# Patient Record
Sex: MALE | Race: OTHER | NOT HISPANIC OR LATINO | URBAN - METROPOLITAN AREA
[De-identification: names, ages, dates, MRNs, and addresses within clinical notes are randomized per-mention and may not be internally consistent; named-entity substitution may affect disease eponyms.]

---

## 2023-07-13 ENCOUNTER — INPATIENT (INPATIENT)
Facility: HOSPITAL | Age: 68
LOS: 3 days | Discharge: ANOTHER HOSPITAL | End: 2023-07-17
Attending: GENERAL PRACTICE | Admitting: GENERAL PRACTICE
Payer: MEDICARE

## 2023-07-13 VITALS
SYSTOLIC BLOOD PRESSURE: 167 MMHG | HEART RATE: 84 BPM | RESPIRATION RATE: 16 BRPM | DIASTOLIC BLOOD PRESSURE: 89 MMHG | OXYGEN SATURATION: 98 % | TEMPERATURE: 98 F

## 2023-07-13 DIAGNOSIS — R07.9 CHEST PAIN, UNSPECIFIED: ICD-10-CM

## 2023-07-13 LAB
ALBUMIN SERPL ELPH-MCNC: 4.6 G/DL — SIGNIFICANT CHANGE UP (ref 3.3–5)
ALP SERPL-CCNC: 84 U/L — SIGNIFICANT CHANGE UP (ref 40–120)
ALT FLD-CCNC: 23 U/L — SIGNIFICANT CHANGE UP (ref 4–41)
ANION GAP SERPL CALC-SCNC: 14 MMOL/L — SIGNIFICANT CHANGE UP (ref 7–14)
AST SERPL-CCNC: 33 U/L — SIGNIFICANT CHANGE UP (ref 4–40)
BASE EXCESS BLDV CALC-SCNC: 1.7 MMOL/L — SIGNIFICANT CHANGE UP (ref -2–3)
BASOPHILS # BLD AUTO: 0.02 K/UL — SIGNIFICANT CHANGE UP (ref 0–0.2)
BASOPHILS NFR BLD AUTO: 0.4 % — SIGNIFICANT CHANGE UP (ref 0–2)
BILIRUB SERPL-MCNC: 0.4 MG/DL — SIGNIFICANT CHANGE UP (ref 0.2–1.2)
BLOOD GAS VENOUS COMPREHENSIVE RESULT: SIGNIFICANT CHANGE UP
BUN SERPL-MCNC: 16 MG/DL — SIGNIFICANT CHANGE UP (ref 7–23)
CALCIUM SERPL-MCNC: 9.8 MG/DL — SIGNIFICANT CHANGE UP (ref 8.4–10.5)
CHLORIDE BLDV-SCNC: 103 MMOL/L — SIGNIFICANT CHANGE UP (ref 96–108)
CHLORIDE SERPL-SCNC: 103 MMOL/L — SIGNIFICANT CHANGE UP (ref 98–107)
CO2 BLDV-SCNC: 27 MMOL/L — HIGH (ref 22–26)
CO2 SERPL-SCNC: 23 MMOL/L — SIGNIFICANT CHANGE UP (ref 22–31)
CREAT SERPL-MCNC: 1.05 MG/DL — SIGNIFICANT CHANGE UP (ref 0.5–1.3)
EGFR: 79 ML/MIN/1.73M2 — SIGNIFICANT CHANGE UP
EOSINOPHIL # BLD AUTO: 0.05 K/UL — SIGNIFICANT CHANGE UP (ref 0–0.5)
EOSINOPHIL NFR BLD AUTO: 1 % — SIGNIFICANT CHANGE UP (ref 0–6)
GAS PNL BLDV: 135 MMOL/L — LOW (ref 136–145)
GLUCOSE BLDC GLUCOMTR-MCNC: 58 MG/DL — LOW (ref 70–99)
GLUCOSE BLDV-MCNC: 160 MG/DL — HIGH (ref 70–99)
GLUCOSE SERPL-MCNC: 150 MG/DL — HIGH (ref 70–99)
HCO3 BLDV-SCNC: 26 MMOL/L — SIGNIFICANT CHANGE UP (ref 22–29)
HCT VFR BLD CALC: 41.3 % — SIGNIFICANT CHANGE UP (ref 39–50)
HCT VFR BLDA CALC: 41 % — SIGNIFICANT CHANGE UP (ref 39–51)
HGB BLD CALC-MCNC: 13.7 G/DL — SIGNIFICANT CHANGE UP (ref 12.6–17.4)
HGB BLD-MCNC: 13.2 G/DL — SIGNIFICANT CHANGE UP (ref 13–17)
IANC: 2.85 K/UL — SIGNIFICANT CHANGE UP (ref 1.8–7.4)
IMM GRANULOCYTES NFR BLD AUTO: 0.4 % — SIGNIFICANT CHANGE UP (ref 0–0.9)
LACTATE BLDV-MCNC: 1.4 MMOL/L — SIGNIFICANT CHANGE UP (ref 0.5–2)
LYMPHOCYTES # BLD AUTO: 1.62 K/UL — SIGNIFICANT CHANGE UP (ref 1–3.3)
LYMPHOCYTES # BLD AUTO: 31.8 % — SIGNIFICANT CHANGE UP (ref 13–44)
MAGNESIUM SERPL-MCNC: 2.3 MG/DL — SIGNIFICANT CHANGE UP (ref 1.6–2.6)
MCHC RBC-ENTMCNC: 26 PG — LOW (ref 27–34)
MCHC RBC-ENTMCNC: 32 GM/DL — SIGNIFICANT CHANGE UP (ref 32–36)
MCV RBC AUTO: 81.5 FL — SIGNIFICANT CHANGE UP (ref 80–100)
MONOCYTES # BLD AUTO: 0.53 K/UL — SIGNIFICANT CHANGE UP (ref 0–0.9)
MONOCYTES NFR BLD AUTO: 10.4 % — SIGNIFICANT CHANGE UP (ref 2–14)
NEUTROPHILS # BLD AUTO: 2.85 K/UL — SIGNIFICANT CHANGE UP (ref 1.8–7.4)
NEUTROPHILS NFR BLD AUTO: 56 % — SIGNIFICANT CHANGE UP (ref 43–77)
NRBC # BLD: 0 /100 WBCS — SIGNIFICANT CHANGE UP (ref 0–0)
NRBC # FLD: 0 K/UL — SIGNIFICANT CHANGE UP (ref 0–0)
NT-PROBNP SERPL-SCNC: 372 PG/ML — HIGH
PCO2 BLDV: 38 MMHG — LOW (ref 42–55)
PH BLDV: 7.44 — HIGH (ref 7.32–7.43)
PLATELET # BLD AUTO: 203 K/UL — SIGNIFICANT CHANGE UP (ref 150–400)
PO2 BLDV: 70 MMHG — HIGH (ref 25–45)
POTASSIUM BLDV-SCNC: 4.6 MMOL/L — SIGNIFICANT CHANGE UP (ref 3.5–5.1)
POTASSIUM SERPL-MCNC: 4.9 MMOL/L — SIGNIFICANT CHANGE UP (ref 3.5–5.3)
POTASSIUM SERPL-SCNC: 4.9 MMOL/L — SIGNIFICANT CHANGE UP (ref 3.5–5.3)
PROT SERPL-MCNC: 7.7 G/DL — SIGNIFICANT CHANGE UP (ref 6–8.3)
RBC # BLD: 5.07 M/UL — SIGNIFICANT CHANGE UP (ref 4.2–5.8)
RBC # FLD: 15.1 % — HIGH (ref 10.3–14.5)
SAO2 % BLDV: 94.4 % — HIGH (ref 67–88)
SODIUM SERPL-SCNC: 140 MMOL/L — SIGNIFICANT CHANGE UP (ref 135–145)
TROPONIN T, HIGH SENSITIVITY RESULT: 14 NG/L — SIGNIFICANT CHANGE UP
WBC # BLD: 5.09 K/UL — SIGNIFICANT CHANGE UP (ref 3.8–10.5)
WBC # FLD AUTO: 5.09 K/UL — SIGNIFICANT CHANGE UP (ref 3.8–10.5)

## 2023-07-13 PROCEDURE — 71045 X-RAY EXAM CHEST 1 VIEW: CPT | Mod: 26

## 2023-07-13 PROCEDURE — 99285 EMERGENCY DEPT VISIT HI MDM: CPT

## 2023-07-13 NOTE — ED PROVIDER NOTE - NS ED ROS FT
Gen: No fever, normal appetite  Eyes: No eye irritation or discharge  ENT: No ear pain, congestion, sore throat  Resp: No cough or trouble breathing  Cardiovascular: No current chest pain or palpitation  Gastroenteric: No nausea/vomiting, diarrhea, constipation  :  No change in urine output; no dysuria  MS: No joint or muscle pain  Skin: No rashes  Neuro: No headache; no abnormal movements  Remainder negative, except as per the HPI

## 2023-07-13 NOTE — ED ADULT NURSE NOTE - NSFALLUNIVINTERV_ED_ALL_ED
Artificial pacemaker    H/O oophorectomy Bed/Stretcher in lowest position, wheels locked, appropriate side rails in place/Call bell, personal items and telephone in reach/Instruct patient to call for assistance before getting out of bed/chair/stretcher/Non-slip footwear applied when patient is off stretcher/[place] to call system/Physically safe environment - no spills, clutter or unnecessary equipment/Purposeful proactive rounding/Room/bathroom lighting operational, light cord in reach

## 2023-07-13 NOTE — ED ADULT NURSE REASSESSMENT NOTE - NS ED NURSE REASSESS COMMENT FT1
At approximately 23:55, PCA Cholo notified author of low blood sugar level. Author reassessed pt, denying shakiness, sweating, clammy feeling, confusion, or other signs of hypoglycemia. Denies CP, SOB, dyspnea, or pain at this time. Respirations are even and unlabored, no signs of respiratory distress. Pt was given 6 fluid oz of apple juice, two jello containers, and 1 pack of georgina crackers. Pt tolerating PO well. Pt educated about signs and symptoms of hypoglycemia and to inform ED staff if any of the symptoms occur. Steady wait. Repeat finger stick of 126 obtained at 0032. Another repeat finger stick of 149 obtained at 0049. SALOME Myers made aware of hypoglycemia episode, informed RN she will order hypoglycemia protocol medications. JOSEPH Pena, floor nurse assuming care, was made aware of the episode.

## 2023-07-13 NOTE — ED PROVIDER NOTE - PHYSICAL EXAMINATION
Gen: WDWN, NAD  HEENT: EOMI, no nasal discharge, mucous membranes moist  CV: RRR, +S1/S2, no M/R/G  Resp: CTAB, no W/R/R  GI: Abdomen soft non-distended, NTTP, no masses  MSK: No open wounds, no bruising, no LE edema  Neuro: A&Ox4, following commands, moving all four extremities spontaneously  Psych: appropriate mood, denies AH, VH, SI Gen: WDWN, NAD  HEENT: EOMI, no nasal discharge, mucous membranes moist  CV: RRR, +S1/S2, no M/R/G  Resp: CTAB, no W/R/R  GI: Abdomen soft mildly distended, NTTP, no masses  MSK: No open wounds, no bruising, no LE edema  Neuro: A&Ox4, following commands, moving all four extremities spontaneously  Psych: appropriate mood, denies AH, VH, SI

## 2023-07-13 NOTE — ED ADULT TRIAGE NOTE - CHIEF COMPLAINT QUOTE
pt having chest pain / SOB/ ISRAEL sent in by MD Nixon for EKG changes/ echo changes- pt for admission for angiogram. hx htn, hld, asthma , sleep apnea, dm fs= 252

## 2023-07-13 NOTE — ED PROVIDER NOTE - OBJECTIVE STATEMENT
After Visit Summary   6/6/2017    Lorne Juan    MRN: [id number]           Patient Information     Date Of Birth          1958        Visit Information        Provider Department      6/6/2017 11:00 AM Arnoldo Zaman MD [facility] Nephrology        Care Instructions    Increase oral calcium supplement to twice daily.  Increase furosemide to 60 mg twice daily.          Follow-ups after your visit        Follow-up notes from your care team     Return in about 8 months (around 2/6/2018).      Your next 10 appointments already scheduled     Feb 12, 2018 12:30 PM CST   Lab with  LAB   [facility] Lab ([facility])    [address] [place] [id number] [phone]            Feb 12, 2018  1:30 PM CST   (Arrive by 1:00 PM)   Return Kidney Transplant with Arnoldo Zaman MD   [facility] Nephrology ([facility])    [address] [place] [id number] [phone]              Who to contact     If you have questions or need follow up information about today's clinic visit or your schedule please contact [facility] NEPHROLOGY directly at [phone].  Normal or non-critical lab and imaging results will be communicated to you by MyChart, letter or phone within 4 business days after the clinic has received the results. If you do not hear from us within 7 days, please contact the clinic through MyChart or phone. If you have a critical or abnormal lab result, we will notify you by phone as soon as possible.  Submit refill requests through [facility]na or call your pharmacy and they will forward the refill request to us. Please allow 3 business days for your refill to be completed.          Additional Information About Your Visit        MyChart Information     [facility]ilna lets you send messages to your doctor, view your test results, renew your prescriptions, schedule appointments and more. To sign up, go to  chest pressure when walking up stairs, 2-3 moths has been worsenng over time, no SOB at rest, (+) ISRAEL  T2DM, HTN, HLD, asthma, on CPAP for LUCRECIA  dr. Wilkinson - admit, patient of Dr. Nixon's    (-) n/v/d/f/c/ab pain/dysuria "[email or web address] . Click on \"Log in\" on the left side of the screen, which will take you to the Welcome page. Then click on \"Sign up Now\" on the right side of the page.     You will be asked to enter the access code listed below, as well as some personal information. Please follow the directions to create your username and password.     Your access code is: [email or web address]  Expires: 2017  1:44 PM     Your access code will  in 90 days. If you need help or a new code, please call your [facility] clinic or [phone].        Care EveryWhere ID     This is your Care EveryWhere ID. This could be used by other organizations to access your [facility] medical records  [id number]        Your Vitals Were     Pulse Temperature Respirations Height BMI (Body Mass Index)       62 97.5  F (36.4  C) (Oral) 18 1.702 m (5' 7\") 23.2 kg/m2        Blood Pressure from Last 3 Encounters:   17 121/71   17 157/83   16 134/79    Weight from Last 3 Encounters:   17 67.2 kg (148 lb 1.6 oz)   17 71.9 kg (158 lb 9.6 oz)   16 54.4 kg (120 lb)              Today, you had the following     No orders found for display         Today's Medication Changes          These changes are accurate as of: 17 11:03 AM.  If you have any questions, ask your nurse or doctor.               These medicines have changed or have updated prescriptions.        Dose/Directions    IMURAN PO   This may have changed:  Another medication with the same name was removed. Continue taking this medication, and follow the directions you see here.   Changed by:  Arnoldo Zaman MD        Dose:  75 mg   Take 75 mg by mouth daily   Refills:  0       insulin glargine 100 UNIT/ML injection   Commonly known as:  LANTUS   This may have changed:  Another medication with the same name was removed. Continue taking this medication, and follow the directions you see here.   Used for:  Type 1 diabetes mellitus with other diabetic " ophthalmic complication (H)   Changed by:  Precious Bahena PA-C        Inject 4 units in the AM; inject 6 units in the PM.   Quantity:  15 mL   Refills:  3       sodium bicarbonate 650 MG tablet   This may have changed:  how much to take   Used for:  Metabolic acidosis        Dose:  1300 mg   Take 2 tablets (1,300 mg) by mouth 2 times daily   Quantity:  60 tablet   Refills:  11         Stop taking these medicines if you haven't already. Please contact your care team if you have questions.     aspirin  MG EC tablet   Stopped by:  Arnoldo Zaman MD                    Primary Care Provider Office Phone # Fax #    Bob Quinn [phone]       [facility] [address] [facility] [address]        Thank you!     Thank you for choosing [facility] NEPHROLOG[facility]  for your care. Our goal is always to provide you with excellent care. Hearing back from our patients is one way we can continue to improve our services. Please take a few minutes to complete the written survey that you may receive in the mail after your visit with us. Thank you!             Your Updated Medication List - Protect others around you: Learn how to safely use, store and throw away your medicines at www.disposemymeds.org.          This list is accurate as of: 6/6/17 11:03 AM.  Always use your most recent med list.                   Brand Name Dispense Instructions for use    alendronate 70 MG tablet    FOSAMAX    12 tablet    Take 1 tablet (70 mg) by mouth every 7 days       amLODIPine 5 MG tablet    NORVASC    90 tablet    Take 1 tablet (5 mg) by mouth daily       blood glucose monitoring test strip    no brand specified    550 each    Test 6 times per day with  Prodigy no  Coding  Glucose strips .       brimonidine-timolol 0.2-0.5 % ophthalmic solution    COMBIGAN     Place 1 drop into the right eye 2 times daily NOT ON HOLD       brinzolamide 1 % ophthalmic susp    AZOPT     Place 1 drop into the right eye 2  times daily.       calcitRIOL 0.25 MCG capsule    ROCALTROL     Take 0.25 mcg by mouth       calcium 500-125 MG-UNIT Tabs      Take  by mouth. Plus D take one daily       carvedilol 6.25 MG tablet    COREG    60 tablet    Take 6 tablets (37.5 mg) by mouth 2 times daily       darbepoetin paulo 60 MCG/0.3ML injection    ARANESP     Inject 60 mcg Subcutaneous       furosemide 20 MG tablet    LASIX    60 tablet    Take 2 tablets (40 mg) by mouth 2 times daily       glucagon 1 MG kit    GLUCAGON EMERGENCY    2 each    Use in event of hypoglycemia       IMURAN PO      Take 75 mg by mouth daily       Injection Device for insulin Helen    HUMAPEN LUXURA HD    1 each    1 each 5 times daily Use as directed with Humalog       insulin glargine 100 UNIT/ML injection    LANTUS    15 mL    Inject 4 units in the AM; inject 6 units in the PM.       insulin lispro 100 UNIT/ML Cartridge    HUMALOG    15 mL    Carb counting with meals approx 20 units daily       insulin pen needle 32G X 4 MM    BD MARQUISE U/F    400 each    Use 4-6 daily or as directed.       losartan 25 MG tablet    COZAAR     Take 25 mg by mouth       MULTIVITAMIN PO      Take 1 tablet by mouth daily.       predniSONE 2.5 MG tablet    DELTASONE    30 tablet    Take 1 tablet (2.5 mg) by mouth daily       sodium bicarbonate 650 MG tablet     60 tablet    Take 2 tablets (1,300 mg) by mouth 2 times daily       tacrolimus 0.5 MG capsule    PROGRAF - GENERIC EQUIVALENT    60 capsule    Take 1 capsule (0.5 mg) by mouth 2 times daily       vitamin D 15318 UNIT capsule    ERGOCALCIFEROL    4 capsule    Take 1 capsule (50,000 Units) by mouth once a week for 26 doses          chest pressure when walking up stairs, 2-3 moths has been worsenng over time, no SOB at rest, (+) ISRAEL  T2DM, HTN, HLD, asthma, on CPAP for LUCRECIA  dr. Wilkinson - admit, patient of Dr. Nixon's, Dr. Isaacs for angio    ECG wih distal septum hypokinesis, EKG with STD     (-) n/v/d/f/c/ab pain/dysuria chest pressure when walking up stairs, 2-3 moths has been worsenng over time, no SOB at rest, (+) ISRAEL  T2DM, HTN, HLD, asthma, on CPAP for LUCRECIA  dr. Wilkinson - admit, patient of Dr. Nixon's, Dr. Isaacs for angio    ECG wih distal septum hypokinesis, EKG with STD     67-year-old male past medical history of type 2 diabetes, hypertension, hyperlipidemia, asthma, on CPAP for LUCRECIA  sent in by Dr. Oral Nixon for concern for worsening chest pain x2 to 3 months  (-) n/v/d/f/c/ab pain/dysuria 67-year-old male past medical history of type 2 diabetes, hypertension, hyperlipidemia, asthma, on CPAP for LUCRECIA  sent in by Dr. Oral Nixon for concern for worsening chest pain x2 to 3 months worsening Over the past several days.  Patient evaluated by Dr. Nixon in the office within echo showing some distal septum hypokinesis and EKG with ST depressions in the lateral leads.  Patient denies any chest pain currently but does endorse chest pressure on exertion, dyspnea on exertion, increased abdominal fullness over the past couple of months, no leg swelling, nausea, vomiting, diarrhea, fever, chills, infectious symptoms.  Plan for ischemic eval with Dr. Isaacs as per Dr. Dupree documentation.  (-) n/v/d/f/c/ab pain/dysuria

## 2023-07-13 NOTE — ED ADULT NURSE NOTE - OBJECTIVE STATEMENT
66 y/o male, a&ox4, ambulatory, received to ED by PMD. Pt sent to Ed for further evaluation after multiple episodes of CP on exertion. Pt denies CP, SOB, dyspnea, or pain at this time. Past medical history of HTN, HLD, DM non-insulin dependent. Airway is patent, speaking in clear and coherent sentences. Respirations are even and unlabored, no signs of respiratory distress. 18GIV placed to RAC, labs collected and sent off.

## 2023-07-13 NOTE — ED PROVIDER NOTE - CLINICAL SUMMARY MEDICAL DECISION MAKING FREE TEXT BOX
67-year-old male past medical history above sent in by Dr. Nixon for ischemic evaluation.  Vital signs stable, physical exam as above.  Will do CBC, CMP, troponin, BNP P, chest x-ray and admit for ischemic eval.  Differential includes but is not limited to ACS, heart failure, less likely pneumonia given no infectious symptoms no fevers but will assess for the same.

## 2023-07-13 NOTE — ED PROVIDER NOTE - ATTENDING CONTRIBUTION TO CARE
I performed a face to face evaluation of this patient and performed a full history and physical examination on the patient.  I agree with the resident's history, physical examination, and plan of the patient.    Pt sent in by cardiologist for admission for angiogram tomorrow. Pt reports several months of chest pain and SOB on exertion, none currently at rest. Plan for pre-op labs/CXR and admission

## 2023-07-14 ENCOUNTER — TRANSCRIPTION ENCOUNTER (OUTPATIENT)
Age: 68
End: 2023-07-14

## 2023-07-14 DIAGNOSIS — Z90.49 ACQUIRED ABSENCE OF OTHER SPECIFIED PARTS OF DIGESTIVE TRACT: Chronic | ICD-10-CM

## 2023-07-14 DIAGNOSIS — Z29.9 ENCOUNTER FOR PROPHYLACTIC MEASURES, UNSPECIFIED: ICD-10-CM

## 2023-07-14 DIAGNOSIS — R07.9 CHEST PAIN, UNSPECIFIED: ICD-10-CM

## 2023-07-14 DIAGNOSIS — E11.649 TYPE 2 DIABETES MELLITUS WITH HYPOGLYCEMIA WITHOUT COMA: ICD-10-CM

## 2023-07-14 DIAGNOSIS — Z79.899 OTHER LONG TERM (CURRENT) DRUG THERAPY: ICD-10-CM

## 2023-07-14 DIAGNOSIS — G47.33 OBSTRUCTIVE SLEEP APNEA (ADULT) (PEDIATRIC): ICD-10-CM

## 2023-07-14 LAB
ANION GAP SERPL CALC-SCNC: 10 MMOL/L — SIGNIFICANT CHANGE UP (ref 7–14)
BUN SERPL-MCNC: 14 MG/DL — SIGNIFICANT CHANGE UP (ref 7–23)
CALCIUM SERPL-MCNC: 9.6 MG/DL — SIGNIFICANT CHANGE UP (ref 8.4–10.5)
CHLORIDE SERPL-SCNC: 104 MMOL/L — SIGNIFICANT CHANGE UP (ref 98–107)
CHOLEST SERPL-MCNC: 131 MG/DL — SIGNIFICANT CHANGE UP
CO2 SERPL-SCNC: 25 MMOL/L — SIGNIFICANT CHANGE UP (ref 22–31)
CREAT SERPL-MCNC: 1.02 MG/DL — SIGNIFICANT CHANGE UP (ref 0.5–1.3)
EGFR: 82 ML/MIN/1.73M2 — SIGNIFICANT CHANGE UP
GLUCOSE BLDC GLUCOMTR-MCNC: 109 MG/DL — HIGH (ref 70–99)
GLUCOSE BLDC GLUCOMTR-MCNC: 115 MG/DL — HIGH (ref 70–99)
GLUCOSE BLDC GLUCOMTR-MCNC: 116 MG/DL — HIGH (ref 70–99)
GLUCOSE BLDC GLUCOMTR-MCNC: 117 MG/DL — HIGH (ref 70–99)
GLUCOSE BLDC GLUCOMTR-MCNC: 126 MG/DL — HIGH (ref 70–99)
GLUCOSE BLDC GLUCOMTR-MCNC: 149 MG/DL — HIGH (ref 70–99)
GLUCOSE SERPL-MCNC: 131 MG/DL — HIGH (ref 70–99)
HCT VFR BLD CALC: 40.6 % — SIGNIFICANT CHANGE UP (ref 39–50)
HDLC SERPL-MCNC: 57 MG/DL — SIGNIFICANT CHANGE UP
HGB BLD-MCNC: 13.2 G/DL — SIGNIFICANT CHANGE UP (ref 13–17)
LIPID PNL WITH DIRECT LDL SERPL: 57 MG/DL — SIGNIFICANT CHANGE UP
MAGNESIUM SERPL-MCNC: 2.7 MG/DL — HIGH (ref 1.6–2.6)
MCHC RBC-ENTMCNC: 26.8 PG — LOW (ref 27–34)
MCHC RBC-ENTMCNC: 32.5 GM/DL — SIGNIFICANT CHANGE UP (ref 32–36)
MCV RBC AUTO: 82.4 FL — SIGNIFICANT CHANGE UP (ref 80–100)
NON HDL CHOLESTEROL: 74 MG/DL — SIGNIFICANT CHANGE UP
NRBC # BLD: 0 /100 WBCS — SIGNIFICANT CHANGE UP (ref 0–0)
NRBC # FLD: 0 K/UL — SIGNIFICANT CHANGE UP (ref 0–0)
PHOSPHATE SERPL-MCNC: 3.5 MG/DL — SIGNIFICANT CHANGE UP (ref 2.5–4.5)
PLATELET # BLD AUTO: 175 K/UL — SIGNIFICANT CHANGE UP (ref 150–400)
POTASSIUM SERPL-MCNC: 3.9 MMOL/L — SIGNIFICANT CHANGE UP (ref 3.5–5.3)
POTASSIUM SERPL-SCNC: 3.9 MMOL/L — SIGNIFICANT CHANGE UP (ref 3.5–5.3)
RBC # BLD: 4.93 M/UL — SIGNIFICANT CHANGE UP (ref 4.2–5.8)
RBC # FLD: 14.9 % — HIGH (ref 10.3–14.5)
SODIUM SERPL-SCNC: 139 MMOL/L — SIGNIFICANT CHANGE UP (ref 135–145)
TRIGL SERPL-MCNC: 87 MG/DL — SIGNIFICANT CHANGE UP
TROPONIN T, HIGH SENSITIVITY RESULT: 14 NG/L — SIGNIFICANT CHANGE UP
TROPONIN T, HIGH SENSITIVITY RESULT: 14 NG/L — SIGNIFICANT CHANGE UP
TSH SERPL-MCNC: 5.39 UIU/ML — HIGH (ref 0.27–4.2)
WBC # BLD: 4.77 K/UL — SIGNIFICANT CHANGE UP (ref 3.8–10.5)
WBC # FLD AUTO: 4.77 K/UL — SIGNIFICANT CHANGE UP (ref 3.8–10.5)

## 2023-07-14 PROCEDURE — 93458 L HRT ARTERY/VENTRICLE ANGIO: CPT | Mod: 26

## 2023-07-14 PROCEDURE — 99152 MOD SED SAME PHYS/QHP 5/>YRS: CPT

## 2023-07-14 PROCEDURE — 99223 1ST HOSP IP/OBS HIGH 75: CPT

## 2023-07-14 RX ORDER — ATORVASTATIN CALCIUM 80 MG/1
40 TABLET, FILM COATED ORAL AT BEDTIME
Refills: 0 | Status: DISCONTINUED | OUTPATIENT
Start: 2023-07-14 | End: 2023-07-17

## 2023-07-14 RX ORDER — INSULIN LISPRO 100/ML
VIAL (ML) SUBCUTANEOUS AT BEDTIME
Refills: 0 | Status: DISCONTINUED | OUTPATIENT
Start: 2023-07-14 | End: 2023-07-17

## 2023-07-14 RX ORDER — SITAGLIPTIN AND METFORMIN HYDROCHLORIDE 500; 50 MG/1; MG/1
0 TABLET, FILM COATED ORAL
Qty: 0 | Refills: 4 | DISCHARGE

## 2023-07-14 RX ORDER — GLUCAGON INJECTION, SOLUTION 0.5 MG/.1ML
1 INJECTION, SOLUTION SUBCUTANEOUS ONCE
Refills: 0 | Status: DISCONTINUED | OUTPATIENT
Start: 2023-07-14 | End: 2023-07-17

## 2023-07-14 RX ORDER — EMPAGLIFLOZIN 10 MG/1
0 TABLET, FILM COATED ORAL
Qty: 0 | Refills: 3 | DISCHARGE

## 2023-07-14 RX ORDER — DEXTROSE 50 % IN WATER 50 %
12.5 SYRINGE (ML) INTRAVENOUS ONCE
Refills: 0 | Status: DISCONTINUED | OUTPATIENT
Start: 2023-07-14 | End: 2023-07-17

## 2023-07-14 RX ORDER — LOSARTAN POTASSIUM 100 MG/1
0 TABLET, FILM COATED ORAL
Qty: 0 | Refills: 3 | DISCHARGE

## 2023-07-14 RX ORDER — ASPIRIN/CALCIUM CARB/MAGNESIUM 324 MG
81 TABLET ORAL DAILY
Refills: 0 | Status: DISCONTINUED | OUTPATIENT
Start: 2023-07-14 | End: 2023-07-17

## 2023-07-14 RX ORDER — HEPARIN SODIUM 5000 [USP'U]/ML
5000 INJECTION INTRAVENOUS; SUBCUTANEOUS EVERY 12 HOURS
Refills: 0 | Status: DISCONTINUED | OUTPATIENT
Start: 2023-07-14 | End: 2023-07-17

## 2023-07-14 RX ORDER — INSULIN LISPRO 100/ML
VIAL (ML) SUBCUTANEOUS EVERY 6 HOURS
Refills: 0 | Status: DISCONTINUED | OUTPATIENT
Start: 2023-07-14 | End: 2023-07-14

## 2023-07-14 RX ORDER — SODIUM CHLORIDE 9 MG/ML
1000 INJECTION, SOLUTION INTRAVENOUS
Refills: 0 | Status: DISCONTINUED | OUTPATIENT
Start: 2023-07-14 | End: 2023-07-17

## 2023-07-14 RX ORDER — DEXTROSE 50 % IN WATER 50 %
25 SYRINGE (ML) INTRAVENOUS ONCE
Refills: 0 | Status: DISCONTINUED | OUTPATIENT
Start: 2023-07-14 | End: 2023-07-17

## 2023-07-14 RX ORDER — ERGOCALCIFEROL 1.25 MG/1
0 CAPSULE ORAL
Qty: 0 | Refills: 0 | DISCHARGE

## 2023-07-14 RX ORDER — AMLODIPINE BESYLATE 2.5 MG/1
0 TABLET ORAL
Qty: 0 | Refills: 1 | DISCHARGE

## 2023-07-14 RX ORDER — INSULIN LISPRO 100/ML
VIAL (ML) SUBCUTANEOUS AT BEDTIME
Refills: 0 | Status: DISCONTINUED | OUTPATIENT
Start: 2023-07-14 | End: 2023-07-14

## 2023-07-14 RX ORDER — INSULIN LISPRO 100/ML
VIAL (ML) SUBCUTANEOUS
Refills: 0 | Status: DISCONTINUED | OUTPATIENT
Start: 2023-07-14 | End: 2023-07-17

## 2023-07-14 RX ORDER — DEXTROSE 50 % IN WATER 50 %
15 SYRINGE (ML) INTRAVENOUS ONCE
Refills: 0 | Status: DISCONTINUED | OUTPATIENT
Start: 2023-07-14 | End: 2023-07-17

## 2023-07-14 RX ORDER — INSULIN LISPRO 100/ML
VIAL (ML) SUBCUTANEOUS
Refills: 0 | Status: DISCONTINUED | OUTPATIENT
Start: 2023-07-14 | End: 2023-07-14

## 2023-07-14 RX ADMIN — Medication 81 MILLIGRAM(S): at 07:43

## 2023-07-14 RX ADMIN — HEPARIN SODIUM 5000 UNIT(S): 5000 INJECTION INTRAVENOUS; SUBCUTANEOUS at 17:23

## 2023-07-14 RX ADMIN — HEPARIN SODIUM 5000 UNIT(S): 5000 INJECTION INTRAVENOUS; SUBCUTANEOUS at 06:37

## 2023-07-14 RX ADMIN — ATORVASTATIN CALCIUM 40 MILLIGRAM(S): 80 TABLET, FILM COATED ORAL at 21:44

## 2023-07-14 NOTE — DISCHARGE NOTE PROVIDER - NSDCFUSCHEDAPPT_GEN_ALL_CORE_FT
[facility]  [facility] PreAdmits  Scheduled Appointment: 07/18/2023     [facility]  CTSURG [address]  Scheduled Appointment: 08/08/2023

## 2023-07-14 NOTE — H&P ADULT - TIME BILLING
Preparing to see the patient including review of tests and other providers' notes, confirming history with patient performing medical examination and evaluation, counseling and educating the patient, ordering medications, tests, documenting clinical information in the EMR, independently interpreting results and communicating results to the patient, care coordination

## 2023-07-14 NOTE — CONSULT NOTE ADULT - SUBJECTIVE AND OBJECTIVE BOX
CHIEF COMPLAINT: cp     HISTORY OF PRESENT ILLNESS:    67-year-old male with NIDDM2, HTN, HLD, asthma, CPAP on LUCRECIA, sent to the ED by Dr. Hart for work-up of EKG abnormalities. Patient reports seeing Dr. Hart for the first time yesterday for intermittent chest pain that has been ongoing with exertion (especially climbing stairs and carrying heavy objects). The pain is described as a pressure over left and right anterior chest wall, radiating to R shoulder, 10/10 in severity, lasting 15-30 minutes at a time, worse with exertion, associated with diaphoresis and without any associated nausea, shortness of breath or palpitations. He denies any history of CAD, prior cath/echo or stress testing. He reports prior w/u at [facility] however unsure of results or testing performed.     Allergies    No Known Allergies    Intolerances    	    MEDICATIONS:  aspirin enteric coated 81 milliGRAM(s) Oral daily  heparin   Injectable 5000 Unit(s) SubCutaneous every 12 hours            atorvastatin 40 milliGRAM(s) Oral at bedtime  dextrose 50% Injectable 12.5 Gram(s) IV Push once  dextrose 50% Injectable 25 Gram(s) IV Push once  dextrose 50% Injectable 25 Gram(s) IV Push once  dextrose Oral Gel 15 Gram(s) Oral once PRN  glucagon  Injectable 1 milliGRAM(s) IntraMuscular once  insulin lispro (ADMELOG) corrective regimen sliding scale   SubCutaneous every 6 hours    dextrose 5%. 1000 milliLiter(s) IV Continuous <Continuous>  dextrose 5%. 1000 milliLiter(s) IV Continuous <Continuous>      PAST MEDICAL & SURGICAL HISTORY:  Hypertension      Hyperlipemia      Diabetes mellitus      Asthma      LUCRECIA on CPAP      History of appendectomy          FAMILY HISTORY:  No pertinent family history in first degree relatives        SOCIAL HISTORY:    non smoker. indep in adl    REVIEW OF SYSTEMS:  See HPI, otherwise complete 10 point review of systems negative    [ ] All others negative	      PHYSICAL EXAM:  T(C): 36.5 (07-14-23 @ 05:47), Max: 36.8 (07-13-23 @ 19:26)  HR: 75 (07-14-23 @ 05:47) (71 - 84)  BP: 125/78 (07-14-23 @ 05:47) (125/78 - 167/89)  RR: 18 (07-14-23 @ 05:47) (16 - 18)  SpO2: 98% (07-14-23 @ 05:47) (98% - 100%)  Wt(kg): --  I&O's Summary      Appearance: No Acute Distress	  HEENT:  Normal oral mucosa, PERRL, EOMI	  Cardiovascular: Normal S1 S2, No JVD, No murmurs/rubs/gallops  Respiratory: Lungs clear to auscultation bilaterally  Gastrointestinal:  Soft, Non-tender, + BS	  Skin: No rashes, No ecchymoses, No cyanosis	  Neurologic: Non-focal  Extremities: No clubbing, cyanosis or edema  Vascular: Peripheral pulses palpable 2+ bilaterally  Psychiatry: A & O x 3, Mood & affect appropriate    Laboratory Data:	 	    CBC Full  -  ( 13 Jul 2023 21:20 )  WBC Count : 5.09 K/uL  Hemoglobin : 13.2 g/dL  Hematocrit : 41.3 %  Platelet Count - Automated : 203 K/uL  Mean Cell Volume : 81.5 fL  Mean Cell Hemoglobin : 26.0 pg  Mean Cell Hemoglobin Concentration : 32.0 gm/dL  Auto Neutrophil # : 2.85 K/uL  Auto Lymphocyte # : 1.62 K/uL  Auto Monocyte # : 0.53 K/uL  Auto Eosinophil # : 0.05 K/uL  Auto Basophil # : 0.02 K/uL  Auto Neutrophil % : 56.0 %  Auto Lymphocyte % : 31.8 %  Auto Monocyte % : 10.4 %  Auto Eosinophil % : 1.0 %  Auto Basophil % : 0.4 %    07-13    140  |  103  |  16  ----------------------------<  150<H>  4.9   |  23  |  1.05    Ca    9.8      13 Jul 2023 21:20  Mg     2.30     07-13    TPro  7.7  /  Alb  4.6  /  TBili  0.4  /  DBili  x   /  AST  33  /  ALT  23  /  AlkPhos  84  07-13      proBNP:   Lipid Profile:   HgA1c:   TSH:       CARDIAC MARKERS:            Interpretation of Telemetry: 	    ECG:  	nsr lateral st depression  RADIOLOGY:  OTHER: 	    PREVIOUS DIAGNOSTIC TESTING:    [ ] Echocardiogram:  [ ] Catheterization:  [ ] Stress Test:  	    Assessment:  67-year-old male with NIDDM2, HTN, HLD, asthma, CPAP on LUCRECIA presents with unstable angina    Recs:  cardiac stable  cw asa statin and antianginals  recent TTE with normal LVEF and inferolateral hypokinesis, suspect stunned myocardium, would repeat after PCI   [facility] with dr carmichael today      Greater than 60 minutes spent on total encounter; more than 50% of the visit was spent counseling and/or coordinating care by the attending physician.   	  Shaun Nixon MD   Cardiovascular Diseases  [phone]

## 2023-07-14 NOTE — DISCHARGE NOTE PROVIDER - NSDCCAREPROVSEEN_GEN_ALL_CORE_FT
Carolyn Wilkinson Carolyn Escoto [place]  User ADM  Shaun Nixon  Team [facility] Medicine ACP      [ Greater than 35 min spent for discharge services.   NOREEN Wilkinson ]

## 2023-07-14 NOTE — H&P ADULT - PROBLEM SELECTOR PLAN 1
currently resolved on my exam  negative delta trop, will repeat with AM labs  ST depressions on lateral leads, per ED notes plan was for ischemic eval with Dr. Isaacs, would call in AM   check TTE  monitor on tele  check A1c, FLP, TSH  repeat CE/EKG if experiences chest pain  pro-BNP wnl for age currently resolved on my exam  negative delta trop, will repeat with AM labs  ST depressions on lateral leads, per ED notes plan was for ischemic eval with Dr. Isaacs, would call in AM   check TTE  monitor on tele  check A1c, FLP, TSH  repeat CE/EKG if experiences chest pain  pro-BNP wnl for age  c/w ASA, statin

## 2023-07-14 NOTE — DISCHARGE NOTE PROVIDER - HOSPITAL COURSE
67-year-old male with NIDDM2, HTN, HLD, asthma, CPAP on LUCRECIA, sent to the ED by Dr. Hart for work-up of EKG abnormalities. Patient reports seeing Dr. Hart for the first time yesterday for intermittent chest pain that has been ongoing with exertion (especially climbing stairs and carrying heavy objects). The pain is described as a pressure over left and right anterior chest wall, radiating to R shoulder, 10/10 in severity, lasting 15-30 minutes at a time, worse with exertion, associated with diaphoresis and without any associated nausea, shortness of breath or palpitations. He denies any history of CAD, prior cath/echo or stress testing. He reports prior w/u at [facility] however unsure of results or testing performed.      Chest pain.   ·  Plan: currently resolved on my exam  negative delta trop, will repeat with AM labs  ST depressions on lateral leads, per ED notes plan was for ischemic eval with Dr. Isaacs, would call in AM   check TTE  monitor on tele  check A1c, FLP, TSH  repeat CE/EKG if experiences chest pain  pro-BNP wnl for age  c/w ASA, statin.  Cardiac cath done on 7/14/23 showed - LHC: pLAD 90%, D1 95%, mLCx 90%, mRCA 90%, LVEDP 15, RRA  accessed    Problem/Plan - 2:  ·  Problem: Hypoglycemia associated with type 2 diabetes mellitus.   ·  Plan: repeat wnl  NPO pending cardiology eval, FS Q6H  hold oral meds while inpatient  FS Q6H w/ISS.    Problem/Plan - 3:  ·  Problem: LUCRECIA on CPAP.   ·  Plan: confirm home settings with pulmonologist in AM.    Problem/Plan - 4:  ·  Problem: Medication management.   ·  Plan: clinical pharmacist emailed for assistance with medication reconciliation.    Problem/Plan - 5:  ·  Problem: Need for prophylactic measure.   ·  Plan: HSQ for DVT ppx.    Transfer to [facility] for CABG eval with Dr Hall.

## 2023-07-14 NOTE — H&P ADULT - HISTORY OF PRESENT ILLNESS
67-year-old male with NIDDM2, HTN, HLD, asthma, CPAP on LUCRECIA, sent to the ED by Dr. Hart for work-up of EKG abnormalities. Patient reports seeing Dr. Hart for the first time yesterday for intermittent chest pain that has been ongoing with exertion (especially climbing stairs and carrying heavy objects). The pain is described as a pressure over left and right anterior chest wall, radiating to R shoulder, 10/10 in severity, lasting 15-30 minutes at a time, worse with exertion, associated with diaphoresis and without any associated nausea, shortness of breath or palpitations. He denies any history of CAD, prior cath/echo or stress testing. He reports prior w/u at [facility] however unsure of results or testing performed.     In the ED VS: 98.3  84  167-89  16  98%RA

## 2023-07-14 NOTE — H&P ADULT - NSHPREVIEWOFSYSTEMS_GEN_ALL_CORE
REVIEW OF SYSTEMS:    CONSTITUTIONAL: No weakness, fevers or chills  EYES/ENT: No visual changes; No dysphagia; No sore throat; No rhinorrhea; No sinus pain/pressure  NECK: No pain or stiffness  RESPIRATORY: No cough, wheezing, hemoptysis; No shortness of breath  CARDIOVASCULAR: No chest pain or palpitations; No lower extremity edema  GASTROINTESTINAL: No abdominal or epigastric pain. No nausea, vomiting, or hematemesis; No diarrhea or constipation. No melena or hematochezia. (+)GERD  GENITOURINARY: No dysuria, frequency or hematuria  NEUROLOGICAL: No numbness, paresthesias, or weakness; No HA; No LH/dizziness  MSK: ambulates without aid; No falls  SKIN: No itching, burning, rashes, or lesions   All other review of systems is negative unless indicated above.

## 2023-07-14 NOTE — H&P ADULT - NSHPLABSRESULTS_GEN_ALL_CORE
13.2   5.09  )-----------( 203 ( 13 Jul 2023 21:20 )             41.3     140  |  103  |  16  ----------------------------<  150<H>     07-13  4.9   |  23  |  1.05    Ca    9.8      13 Jul 2023 21:20  Mg     2.30     07-13    TPro  7.7  /  Alb  4.6  /  TBili  0.4  /  DBili  x   /  AST  33  /  ALT  23  /  AlkPhos  84  07-13    21:20 - VBG - pH: 7.44  | pCO2: 38    | pO2: 70    | Lactate: 1.4      hs Troponin, T - 14 ng/L (07-13-23 @ 23:00)  hs Troponin, T - 14 ng/L (07-13-23 @ 21:20)    Pro-BNP: 372 (07-13-23 @ 21:20)    CXR personally reviewed and interpreted - No focal consolidations     EKG personally reviewed and interpreted - NSR 85bpm, TWI in aVL, ST depressions ~1mm in V4-V6; QTc 414ms

## 2023-07-14 NOTE — H&P ADULT - TIME-BASED BILLING (NON-CRITICAL CARE)
Cibinqo Pregnancy And Lactation Text: It is unknown if this medication will adversely affect pregnancy or breast feeding.  You should not take this medication if you are currently pregnant or planning a pregnancy or while breastfeeding. Time-based billing (NON-critical care)

## 2023-07-14 NOTE — H&P ADULT - NSICDXPASTMEDICALHX_GEN_ALL_CORE_FT
PAST MEDICAL HISTORY:  Asthma     Diabetes mellitus     Hyperlipemia     Hypertension     LUCRECIA on CPAP

## 2023-07-14 NOTE — DISCHARGE NOTE PROVIDER - NSDCMRMEDTOKEN_GEN_ALL_CORE_FT
aspirin 81 mg oral delayed release tablet: 1 tab(s) orally once a day  ATORVASTATIN 40MG TAB: 1 tab(s) orally once a day  OMEPRAZOLE 40MG CAPSULE: TAKE 1 CAPSULE BY MOUTH DAILY  TAMSULOSIN CAPS 0.4 MG     ACT: TAKE 1 CAPSULE BY MOUTH DAILY AT BEDTIME   amLODIPine 10 mg oral tablet: 1 tab(s) orally once a day  aspirin 81 mg oral delayed release tablet: 1 tab(s) orally once a day  ATORVASTATIN 40MG TAB: 1 tab(s) orally once a day  metoprolol: 12.5 milligram(s) orally 2 times a day  pantoprazole 40 mg oral delayed release tablet: 1 tab(s) orally once a day (before a meal)  TAMSULOSIN CAPS 0.4 MG     ACT: TAKE 1 CAPSULE BY MOUTH DAILY AT BEDTIME   acetaminophen: 2 cap(s) orally once a day (at bedtime)  amLODIPine 10 mg oral tablet: 1 tab(s) orally once a day  aspirin 81 mg oral delayed release tablet: 1 tab(s) orally once a day  ATORVASTATIN 40MG TAB: 1 tab(s) orally once a day  furosemide 40 mg oral tablet: 1 tab(s) orally once a day  glimepiride 2 mg oral tablet: 1 tab(s) orally 2 times a day  metoprolol tartrate 50 mg oral tablet: 1 tab(s) orally 2 times a day  oxyCODONE 5 mg oral tablet: 1 tab(s) orally every 6 hours as needed for  moderate pain MDD: 20  pantoprazole 40 mg oral delayed release tablet: 1 tab(s) orally once a day (before a meal)  Potassium Chloride (Eqv-Klor-Con 10) 10 mEq oral tablet, extended release: 1 tab(s) orally once a day  senna leaf extract oral tablet: 2 tab(s) orally once a day (at bedtime)  sitagliptin-metformin 50 mg-1000 mg oral tablet: 1 tab(s) orally 2 times a day  spironolactone 25 mg oral tablet: 1 tab(s) orally once a day  TAMSULOSIN CAPS 0.4 MG     ACT: TAKE 1 CAPSULE BY MOUTH DAILY AT BEDTIME

## 2023-07-14 NOTE — DISCHARGE NOTE PROVIDER - NSDCCPCAREPLAN_GEN_ALL_CORE_FT
PRINCIPAL DISCHARGE DIAGNOSIS  Diagnosis: Chest pain  Assessment and Plan of Treatment: - LHC: pLAD 90%, D1 95%, mLCx 90%, mRCA 90%, LVEDP 15, RRA accessed transfer to [facility] for CABG eval      SECONDARY DISCHARGE DIAGNOSES  Diagnosis: CAD (coronary artery disease)  Assessment and Plan of Treatment: - C: pLAD 90%, D1 95%, mLCx 90%, mRCA 90%, LVEDP 15, RRA accessed transfer to [facility] for CABG eval    Diagnosis: HTN (hypertension)  Assessment and Plan of Treatment: Followup with Please follow up with your primary care physician regarding your hospitalization and take all medications prescribed.

## 2023-07-14 NOTE — H&P ADULT - NSHPPHYSICALEXAM_GEN_ALL_CORE
Vital Signs Last 24 Hrs  T(C): 36.6 (14 Jul 2023 01:02), Max: 36.8 (13 Jul 2023 19:26)  T(F): 97.8 (14 Jul 2023 01:02), Max: 98.3 (13 Jul 2023 19:26)  HR: 78 (14 Jul 2023 01:02) (71 - 84)  BP: 159/85 (14 Jul 2023 01:02) (127/74 - 167/89)  BP(mean): 90 (14 Jul 2023 00:02) (90 - 90)  RR: 18 (14 Jul 2023 01:02) (16 - 18)  SpO2: 100% (14 Jul 2023 01:02) (98% - 100%)    Parameters below as of 14 Jul 2023 01:02  Patient On (Oxygen Delivery Method): room air    PHYSICAL EXAM:  GENERAL: NAD  HEAD:  Atraumatic, Normocephalic  EYES: EOMI, PERRL, conjunctiva and sclera clear  NECK: Supple, No JVD  CHEST/LUNG: Clear to auscultation bilaterally; No wheezes, rales or rhonchi; normal work of breathing, speaking in full sentences  HEART: Regular rate and rhythm; No murmurs, rubs, or gallops, (+)S1, S2  ABDOMEN: Soft, Nontender, Nondistended; Normal Bowel sounds   EXTREMITIES:  2+ Peripheral Pulses, No clubbing, cyanosis, or edema  PSYCH: normal mood and affect, A&Ox3  NEUROLOGY: no focal neuro deficits  SKIN: No rashes or lesions

## 2023-07-14 NOTE — DISCHARGE NOTE PROVIDER - CARE PROVIDER_API CALL
Shaun Nixon  Cardiovascular Disease  [address] [place] [address]  Phone: [phone]  Fax: [phone]  Established Patient  Follow Up Time: 1 week

## 2023-07-14 NOTE — PHARMACOTHERAPY INTERVENTION NOTE - COMMENTS
Medication list in Outpatient Medication Review (OMR) confirmed with outpatient pharmacy ([facility]).

## 2023-07-14 NOTE — H&P ADULT - ASSESSMENT
67-year-old male with NIDDM2, HTN, HLD, asthma, CPAP on LUCERCIA, sent to the ED by Dr. Hart for work-up of EKG abnormalities.

## 2023-07-15 LAB
A1C WITH ESTIMATED AVERAGE GLUCOSE RESULT: 7 % — HIGH (ref 4–5.6)
ANION GAP SERPL CALC-SCNC: 16 MMOL/L — HIGH (ref 7–14)
BUN SERPL-MCNC: 18 MG/DL — SIGNIFICANT CHANGE UP (ref 7–23)
CALCIUM SERPL-MCNC: 9.7 MG/DL — SIGNIFICANT CHANGE UP (ref 8.4–10.5)
CHLORIDE SERPL-SCNC: 100 MMOL/L — SIGNIFICANT CHANGE UP (ref 98–107)
CO2 SERPL-SCNC: 21 MMOL/L — LOW (ref 22–31)
CREAT SERPL-MCNC: 1.04 MG/DL — SIGNIFICANT CHANGE UP (ref 0.5–1.3)
EGFR: 80 ML/MIN/1.73M2 — SIGNIFICANT CHANGE UP
ESTIMATED AVERAGE GLUCOSE: 154 — SIGNIFICANT CHANGE UP
GLUCOSE BLDC GLUCOMTR-MCNC: 122 MG/DL — HIGH (ref 70–99)
GLUCOSE BLDC GLUCOMTR-MCNC: 130 MG/DL — HIGH (ref 70–99)
GLUCOSE BLDC GLUCOMTR-MCNC: 141 MG/DL — HIGH (ref 70–99)
GLUCOSE BLDC GLUCOMTR-MCNC: 143 MG/DL — HIGH (ref 70–99)
GLUCOSE SERPL-MCNC: 144 MG/DL — HIGH (ref 70–99)
HCT VFR BLD CALC: 44.5 % — SIGNIFICANT CHANGE UP (ref 39–50)
HCV AB S/CO SERPL IA: 0.1 S/CO — SIGNIFICANT CHANGE UP (ref 0–0.99)
HCV AB SERPL-IMP: SIGNIFICANT CHANGE UP
HGB BLD-MCNC: 14.2 G/DL — SIGNIFICANT CHANGE UP (ref 13–17)
MAGNESIUM SERPL-MCNC: 2.3 MG/DL — SIGNIFICANT CHANGE UP (ref 1.6–2.6)
MCHC RBC-ENTMCNC: 26.6 PG — LOW (ref 27–34)
MCHC RBC-ENTMCNC: 31.9 GM/DL — LOW (ref 32–36)
MCV RBC AUTO: 83.3 FL — SIGNIFICANT CHANGE UP (ref 80–100)
NRBC # BLD: 0 /100 WBCS — SIGNIFICANT CHANGE UP (ref 0–0)
NRBC # FLD: 0 K/UL — SIGNIFICANT CHANGE UP (ref 0–0)
PHOSPHATE SERPL-MCNC: 3.5 MG/DL — SIGNIFICANT CHANGE UP (ref 2.5–4.5)
PLATELET # BLD AUTO: 177 K/UL — SIGNIFICANT CHANGE UP (ref 150–400)
POTASSIUM SERPL-MCNC: 4.1 MMOL/L — SIGNIFICANT CHANGE UP (ref 3.5–5.3)
POTASSIUM SERPL-SCNC: 4.1 MMOL/L — SIGNIFICANT CHANGE UP (ref 3.5–5.3)
RBC # BLD: 5.34 M/UL — SIGNIFICANT CHANGE UP (ref 4.2–5.8)
RBC # FLD: 14.9 % — HIGH (ref 10.3–14.5)
SODIUM SERPL-SCNC: 137 MMOL/L — SIGNIFICANT CHANGE UP (ref 135–145)
WBC # BLD: 5.61 K/UL — SIGNIFICANT CHANGE UP (ref 3.8–10.5)
WBC # FLD AUTO: 5.61 K/UL — SIGNIFICANT CHANGE UP (ref 3.8–10.5)

## 2023-07-15 RX ADMIN — Medication 81 MILLIGRAM(S): at 08:36

## 2023-07-15 RX ADMIN — HEPARIN SODIUM 5000 UNIT(S): 5000 INJECTION INTRAVENOUS; SUBCUTANEOUS at 06:17

## 2023-07-15 RX ADMIN — HEPARIN SODIUM 5000 UNIT(S): 5000 INJECTION INTRAVENOUS; SUBCUTANEOUS at 18:05

## 2023-07-15 RX ADMIN — ATORVASTATIN CALCIUM 40 MILLIGRAM(S): 80 TABLET, FILM COATED ORAL at 21:19

## 2023-07-15 NOTE — CHART NOTE - NSCHARTNOTEFT_GEN_A_CORE
GILA HSAH  MRN-[id number]    Patient status post [facility] via RRA access. Site clean, dry, and intact. No hematoma or active bleeding. No blood noted on gauze. Extremities warm to touch. Pulses present b/l, capillary refill appropriate. Denies any pain, numbness or tingling. Will continue to monitor.    T(C): 36.5 (07-15-23 @ 01:34), Max: 36.7 (07-14-23 @ 13:40)  HR: 73 (07-15-23 @ 01:34) (69 - 78)  BP: 147/93 (07-15-23 @ 01:34) (106/74 - 147/93)  RR: 18 (07-15-23 @ 01:34) (18 - 18)  SpO2: 100% (07-15-23 @ 01:34) (98% - 100%)    Pete Wilson PA-C
Patient evaluated, case discussed with cardiologist , ACP, chart reviewed, H/P already done by nocturnist   cardiac cath today  dispo plan pending above and cardiologist clearance)   Dr. NOREEN Wilkinson ([phone])
[id number] COVERAGE.    Hypoglycemic episode. FS 56. Pt asymptomatic at this time. Pt a&Ox4, and was able to tolerate PO with improvement in his FS.   Per chart review, pt T2DM, will place hypoglycemic protocol. Will continue to monitor.     Vital Signs Last 24 Hrs  T(C): 36.4 (14 Jul 2023 00:02), Max: 36.8 (13 Jul 2023 19:26)  T(F): 97.5 (14 Jul 2023 00:02), Max: 98.3 (13 Jul 2023 19:26)  HR: 71 (14 Jul 2023 00:02) (71 - 84)  BP: 127/74 (14 Jul 2023 00:02) (127/74 - 167/89)  BP(mean): 90 (14 Jul 2023 00:02) (90 - 90)  RR: 16 (14 Jul 2023 00:02) (16 - 16)  SpO2: 98% (14 Jul 2023 00:02) (98% - 98%)    Parameters below as of 14 Jul 2023 00:02  Patient On (Oxygen Delivery Method): room air    CAPILLARY BLOOD GLUCOSE  POCT Blood Glucose.: 149 mg/dL (14 Jul 2023 00:49)  POCT Blood Glucose.: 126 mg/dL (14 Jul 2023 00:32)  POCT Blood Glucose.: 58 mg/dL (13 Jul 2023 23:55)  POCT Blood Glucose.: 252 mg/dL (13 Jul 2023 19:33)    Nela Myers PA  Medicine [id number]

## 2023-07-16 LAB
ANION GAP SERPL CALC-SCNC: 14 MMOL/L — SIGNIFICANT CHANGE UP (ref 7–14)
BUN SERPL-MCNC: 20 MG/DL — SIGNIFICANT CHANGE UP (ref 7–23)
CALCIUM SERPL-MCNC: 9.6 MG/DL — SIGNIFICANT CHANGE UP (ref 8.4–10.5)
CHLORIDE SERPL-SCNC: 101 MMOL/L — SIGNIFICANT CHANGE UP (ref 98–107)
CO2 SERPL-SCNC: 23 MMOL/L — SIGNIFICANT CHANGE UP (ref 22–31)
CREAT SERPL-MCNC: 1.02 MG/DL — SIGNIFICANT CHANGE UP (ref 0.5–1.3)
EGFR: 82 ML/MIN/1.73M2 — SIGNIFICANT CHANGE UP
GLUCOSE BLDC GLUCOMTR-MCNC: 109 MG/DL — HIGH (ref 70–99)
GLUCOSE BLDC GLUCOMTR-MCNC: 160 MG/DL — HIGH (ref 70–99)
GLUCOSE BLDC GLUCOMTR-MCNC: 169 MG/DL — HIGH (ref 70–99)
GLUCOSE BLDC GLUCOMTR-MCNC: 169 MG/DL — HIGH (ref 70–99)
GLUCOSE SERPL-MCNC: 159 MG/DL — HIGH (ref 70–99)
HCT VFR BLD CALC: 43.7 % — SIGNIFICANT CHANGE UP (ref 39–50)
HGB BLD-MCNC: 14.1 G/DL — SIGNIFICANT CHANGE UP (ref 13–17)
MAGNESIUM SERPL-MCNC: 2.2 MG/DL — SIGNIFICANT CHANGE UP (ref 1.6–2.6)
MCHC RBC-ENTMCNC: 26.4 PG — LOW (ref 27–34)
MCHC RBC-ENTMCNC: 32.3 GM/DL — SIGNIFICANT CHANGE UP (ref 32–36)
MCV RBC AUTO: 81.8 FL — SIGNIFICANT CHANGE UP (ref 80–100)
NRBC # BLD: 0 /100 WBCS — SIGNIFICANT CHANGE UP (ref 0–0)
NRBC # FLD: 0 K/UL — SIGNIFICANT CHANGE UP (ref 0–0)
PHOSPHATE SERPL-MCNC: 3.6 MG/DL — SIGNIFICANT CHANGE UP (ref 2.5–4.5)
PLATELET # BLD AUTO: 169 K/UL — SIGNIFICANT CHANGE UP (ref 150–400)
POTASSIUM SERPL-MCNC: 4.1 MMOL/L — SIGNIFICANT CHANGE UP (ref 3.5–5.3)
POTASSIUM SERPL-SCNC: 4.1 MMOL/L — SIGNIFICANT CHANGE UP (ref 3.5–5.3)
RBC # BLD: 5.34 M/UL — SIGNIFICANT CHANGE UP (ref 4.2–5.8)
RBC # FLD: 14.8 % — HIGH (ref 10.3–14.5)
SODIUM SERPL-SCNC: 138 MMOL/L — SIGNIFICANT CHANGE UP (ref 135–145)
WBC # BLD: 5.34 K/UL — SIGNIFICANT CHANGE UP (ref 3.8–10.5)
WBC # FLD AUTO: 5.34 K/UL — SIGNIFICANT CHANGE UP (ref 3.8–10.5)

## 2023-07-16 RX ADMIN — Medication 1: at 08:40

## 2023-07-16 RX ADMIN — Medication 81 MILLIGRAM(S): at 12:48

## 2023-07-16 RX ADMIN — Medication 1: at 12:48

## 2023-07-16 RX ADMIN — ATORVASTATIN CALCIUM 40 MILLIGRAM(S): 80 TABLET, FILM COATED ORAL at 22:34

## 2023-07-16 RX ADMIN — HEPARIN SODIUM 5000 UNIT(S): 5000 INJECTION INTRAVENOUS; SUBCUTANEOUS at 07:35

## 2023-07-16 RX ADMIN — HEPARIN SODIUM 5000 UNIT(S): 5000 INJECTION INTRAVENOUS; SUBCUTANEOUS at 18:24

## 2023-07-16 NOTE — PROGRESS NOTE ADULT - ASSESSMENT
_________________________________________________________________________________________  ========>>  M E D I C A L   A T T E N D I N G    F O L L O W  U P  N O T E  <<=========  -----------------------------------------------------------------------------------------------------    - Patient seen and examined by me earlier today.   - In summary,  GILA SHAH is a 67y year old man admitted with   - Patient today overall doing ok, comfortable, eating OK.     ==================>> REVIEW OF SYSTEM <<=================    GEN: no fever, no chills, no pain  RESP: no SOB, no cough, no sputum  CVS: no chest pain, no palpitations, no edema  GI: no abdominal pain, no nausea, no constipation, no diarrhea  : no dysuria, no frequency, no hematuria  Neuro: no headache, no dizziness  Derm : no itching, no rash    ==================>> PHYSICAL EXAM <<=================    GEN: A&O X 3 , NAD , comfortable, pleasant, calm   HEENT: NCAT, PERRL, MMM, hearing intact  Neck: supple , no JVD appreciated  CVS: S1S2 , regular , No M/R/G appreciated  PULM: CTA B/L,  no W/R/R appreciated  ABD.: soft. non tender, non distended,  bowel sounds present  Extrem: intact pulses , no edema   PSYCH : normal mood,  not anxious                            ( Note Written / Date of service :  07-16-23 )    ==================>> MEDICATIONS <<====================    MEDICATIONS  (STANDING):  aspirin enteric coated 81 milliGRAM(s) Oral daily  atorvastatin 40 milliGRAM(s) Oral at bedtime  dextrose 5%. 1000 milliLiter(s) (100 mL/Hr) IV Continuous <Continuous>  dextrose 5%. 1000 milliLiter(s) (50 mL/Hr) IV Continuous <Continuous>  dextrose 50% Injectable 12.5 Gram(s) IV Push once  dextrose 50% Injectable 25 Gram(s) IV Push once  dextrose 50% Injectable 25 Gram(s) IV Push once  glucagon  Injectable 1 milliGRAM(s) IntraMuscular once  heparin   Injectable 5000 Unit(s) SubCutaneous every 12 hours  insulin lispro (ADMELOG) corrective regimen sliding scale   SubCutaneous at bedtime  insulin lispro (ADMELOG) corrective regimen sliding scale   SubCutaneous three times a day before meals    MEDICATIONS  (PRN):  dextrose Oral Gel 15 Gram(s) Oral once PRN Blood Glucose LESS THAN 70 milliGRAM(s)/deciliter    ___________  Active diet:  Diet, Regular:   Consistent Carbohydrate No Snacks (CSTCHO)  DASH/TLC Sodium & Cholesterol Restricted (DASH)  Low Fat (LOWFAT)  Low Sodium  Halal  ___________________    ==================>> VITAL SIGNS <<==================    T(C): 36.9 (07-16-23 @ 13:05), Max: 36.9 (07-16-23 @ 13:05)  HR: 72 (07-16-23 @ 13:05) (60 - 72)  BP: 126/75 (07-16-23 @ 13:05) (126/75 - 149/79)  BP(mean): --  RR: 18 (07-16-23 @ 13:05) (15 - 18)  SpO2: 100% (07-16-23 @ 13:05) (100% - 100%)     CAPILLARY BLOOD GLUCOSE      POCT Blood Glucose.: 109 mg/dL (16 Jul 2023 17:00)  POCT Blood Glucose.: 169 mg/dL (16 Jul 2023 12:38)  POCT Blood Glucose.: 160 mg/dL (16 Jul 2023 08:30)  POCT Blood Glucose.: 122 mg/dL (15 Jul 2023 22:47)    I&O's Summary       ==================>> LAB AND IMAGING <<==================                        14.1   5.34  )-----------( 169      ( 16 Jul 2023 07:20 )             43.7        07-16    138  |  101  |  20  ----------------------------<  159<H>  4.1   |  23  |  1.02    Ca    9.6      16 Jul 2023 07:20  Phos  3.6     07-16  Mg     2.20     07-16                       Urinalysis Basic - ( 16 Jul 2023 07:20 )    Color: x / Appearance: x / SG: x / pH: x  Gluc: 159 mg/dL / Ketone: x  / Bili: x / Urobili: x   Blood: x / Protein: x / Nitrite: x   Leuk Esterase: x / RBC: x / WBC x   Sq Epi: x / Non Sq Epi: x / Bacteria: x    TSH:      5.39   (07-14-23)           Lipid profile:  (07-14-23)     Total: 131     LDL  : (p)     HDL  :57     TG   :87     HgA1C:   (07-15-23)          (07-15-23)      7.0    ___________________________________________________________________________________  ===============>>  A S S E S S M E N T   A N D   P L A N <<===============  ------------------------------------------------------------------------------------------          -GI/DVT Prophylaxis per protocol.    --------------------------------------------  Case discussed with   Education given on findings and plan of care  ___________________________  HPablito Wilkinson D.O.  Pager: [phone]       _________________________________________________________________________________________  ========>>  M E D I C A L   A T T E N D I N G    F O L L O W  U P  N O T E  <<=========  -----------------------------------------------------------------------------------------------------    - Patient seen and examined by me earlier today.   - In summary,  GILA SHAH is a 67y year old man admitted with CP / CAD   - Patient today overall doing ok, comfortable, eating OK. CP free     ==================>> REVIEW OF SYSTEM <<=================    GEN: no fever, no chills, no pain  RESP: no SOB, no cough, no sputum  CVS: no chest pain, no palpitations, no edema  GI: no abdominal pain, no nausea, no constipation, no diarrhea  : no dysuria, no frequency, no hematuria  Neuro: no headache, no dizziness  Derm : no itching, no rash    ==================>> PHYSICAL EXAM <<=================    GEN: A&O X 3 , NAD , comfortable, pleasant, calm resting in bed > encouraged out of bed to chair with assistance as needed.   HEENT: NCAT, PERRL, MMM, hearing intact  Neck: supple , no JVD appreciated  CVS: S1S2 , regular , No M/R/G appreciated  PULM: CTA B/L,  no W/R/R appreciated  ABD.: soft. non tender, non distended,  bowel sounds present  Extrem: intact pulses , no edema   PSYCH : normal mood,  not anxious                            ( Note Written / Date of service :  07-16-23 )    ==================>> MEDICATIONS <<====================    MEDICATIONS  (STANDING):  aspirin enteric coated 81 milliGRAM(s) Oral daily  atorvastatin 40 milliGRAM(s) Oral at bedtime  dextrose 5%. 1000 milliLiter(s) (100 mL/Hr) IV Continuous <Continuous>  dextrose 5%. 1000 milliLiter(s) (50 mL/Hr) IV Continuous <Continuous>  dextrose 50% Injectable 12.5 Gram(s) IV Push once  dextrose 50% Injectable 25 Gram(s) IV Push once  dextrose 50% Injectable 25 Gram(s) IV Push once  glucagon  Injectable 1 milliGRAM(s) IntraMuscular once  heparin   Injectable 5000 Unit(s) SubCutaneous every 12 hours  insulin lispro (ADMELOG) corrective regimen sliding scale   SubCutaneous at bedtime  insulin lispro (ADMELOG) corrective regimen sliding scale   SubCutaneous three times a day before meals    MEDICATIONS  (PRN):  dextrose Oral Gel 15 Gram(s) Oral once PRN Blood Glucose LESS THAN 70 milliGRAM(s)/deciliter    ___________  Active diet:  Diet, Regular:   Consistent Carbohydrate No Snacks (CSTCHO)  DASH/TLC Sodium & Cholesterol Restricted (DASH)  Low Fat (LOWFAT)  Low Sodium  Halal  ___________________    ==================>> VITAL SIGNS <<==================    T(C): 36.9 (07-16-23 @ 13:05), Max: 36.9 (07-16-23 @ 13:05)  HR: 72 (07-16-23 @ 13:05) (60 - 72)  BP: 126/75 (07-16-23 @ 13:05) (126/75 - 149/79)  RR: 18 (07-16-23 @ 13:05) (15 - 18)  SpO2: 100% (07-16-23 @ 13:05) (100% - 100%)     CAPILLARY BLOOD GLUCOSE  POCT Blood Glucose.: 109 mg/dL (16 Jul 2023 17:00)  POCT Blood Glucose.: 169 mg/dL (16 Jul 2023 12:38)  POCT Blood Glucose.: 160 mg/dL (16 Jul 2023 08:30)  POCT Blood Glucose.: 122 mg/dL (15 Jul 2023 22:47)       ==================>> LAB AND IMAGING <<==================                        14.1   5.34  )-----------( 169      ( 16 Jul 2023 07:20 )             43.7        07-16    138  |  101  |  20  ----------------------------<  159<H>  4.1   |  23  |  1.02    Ca    9.6      16 Jul 2023 07:20  Phos  3.6     07-16  Mg     2.20     07-16    ~~ High Sensitivity Troponin  ~~  14  <<--, 14  <<--, 14  <<--      Urinalysis Basic - ( 16 Jul 2023 07:20 )  Color: x / Appearance: x / SG: x / pH: x  Gluc: 159 mg/dL / Ketone: x  / Bili: x / Urobili: x   Blood: x / Protein: x / Nitrite: x   Leuk Esterase: x / RBC: x / WBC x   Sq Epi: x / Non Sq Epi: x / Bacteria: x    TSH:      5.39   (07-14-23)           Lipid profile:  (07-14-23)     Total: 131     LDL  : (p)     HDL  :57     TG   :87     HgA1C:   (07-15-23)          (07-15-23)      7.0    < from: Cardiac Catheterization (07.14.23 @ 09:51) >  Procedures:                 1.    Arterial Access - Right Radial   2.    Left Heart Cath   3.    Diagnostic Coronary Angiography   Primary ICD-10: I25.110 - Atherosclerotic heart disease of native  coronary artery with unstable angina pectoris   Secondary ICD-10   E11.59 - Type 2 diabetes mellitus with other circulatory complications  I10 - Essential (primary) hypertension     Diagnostic Conclusions:   Patient has severe triple vessel disease as described with progressive  symptoms of angin    Recommendations:   Revascularization with CABG. Transfer to [facility]   < end of copied text >    ___________________________________________________________________________________  ===============>>  A S S E S S M E N T   A N D   P L A N <<===============  ------------------------------------------------------------------------------------------    · Assessment	  67-year-old male with NIDDM2, HTN, HLD, asthma, CPAP on LUCRECIA, sent to the ED by Dr. Hart for work-up of EKG abnormalities.       Problem/Plan - 1:  ·  Problem: Chest pain in patient with severe CAD.   ·  Plan: Awaiting transfer to [facility] for CABG evaluation ..   check TTE  monitor on tele  c/w ASA, statin.     Problem/Plan - 2:  ·  Problem: Hypoglycemia associated with type 2 diabetes mellitus.   hold oral meds while inpatient  FS Q6H w/ISS.  mop FS     Problem/Plan - 3:  ·  Problem: LUCRECIA on CPAP.   ·  Plan: confirm home settings with pulmonologist in AM.  > would need CPAP ordered asia-op as well      Problem/Plan - 4:  ·  Problem: Need for prophylactic measure.   ·  Plan: HSQ for DVT ppx.    --------------------------------------------  Case discussed with patient, transfer center, PA   Education given on findings and plan of care  ___________________________  H. MERRY Wilkinson.  Pager: [phone]

## 2023-07-17 ENCOUNTER — TRANSCRIPTION ENCOUNTER (OUTPATIENT)
Age: 68
End: 2023-07-17

## 2023-07-17 ENCOUNTER — INPATIENT (INPATIENT)
Facility: HOSPITAL | Age: 68
LOS: 7 days | Discharge: HOME HEALTH | DRG: 235 | End: 2023-07-25
Attending: STUDENT IN AN ORGANIZED HEALTH CARE EDUCATION/TRAINING PROGRAM | Admitting: STUDENT IN AN ORGANIZED HEALTH CARE EDUCATION/TRAINING PROGRAM
Payer: COMMERCIAL

## 2023-07-17 VITALS
TEMPERATURE: 98 F | RESPIRATION RATE: 17 BRPM | SYSTOLIC BLOOD PRESSURE: 156 MMHG | DIASTOLIC BLOOD PRESSURE: 85 MMHG | OXYGEN SATURATION: 100 % | HEART RATE: 68 BPM

## 2023-07-17 VITALS
DIASTOLIC BLOOD PRESSURE: 83 MMHG | SYSTOLIC BLOOD PRESSURE: 151 MMHG | WEIGHT: 174.17 LBS | TEMPERATURE: 98 F | HEIGHT: 64 IN | RESPIRATION RATE: 18 BRPM | OXYGEN SATURATION: 99 % | HEART RATE: 60 BPM

## 2023-07-17 DIAGNOSIS — E11.9 TYPE 2 DIABETES MELLITUS WITHOUT COMPLICATIONS: ICD-10-CM

## 2023-07-17 DIAGNOSIS — I25.10 ATHEROSCLEROTIC HEART DISEASE OF NATIVE CORONARY ARTERY WITHOUT ANGINA PECTORIS: ICD-10-CM

## 2023-07-17 DIAGNOSIS — Z90.49 ACQUIRED ABSENCE OF OTHER SPECIFIED PARTS OF DIGESTIVE TRACT: Chronic | ICD-10-CM

## 2023-07-17 PROBLEM — G47.33 OBSTRUCTIVE SLEEP APNEA (ADULT) (PEDIATRIC): Chronic | Status: ACTIVE | Noted: 2023-07-14

## 2023-07-17 PROBLEM — J45.909 UNSPECIFIED ASTHMA, UNCOMPLICATED: Chronic | Status: ACTIVE | Noted: 2023-07-14

## 2023-07-17 PROBLEM — E78.5 HYPERLIPIDEMIA, UNSPECIFIED: Chronic | Status: ACTIVE | Noted: 2023-07-13

## 2023-07-17 PROBLEM — I10 ESSENTIAL (PRIMARY) HYPERTENSION: Chronic | Status: ACTIVE | Noted: 2023-07-13

## 2023-07-17 LAB
ANION GAP SERPL CALC-SCNC: 13 MMOL/L — SIGNIFICANT CHANGE UP (ref 7–14)
BUN SERPL-MCNC: 26 MG/DL — HIGH (ref 7–23)
CALCIUM SERPL-MCNC: 10.2 MG/DL — SIGNIFICANT CHANGE UP (ref 8.4–10.5)
CHLORIDE SERPL-SCNC: 102 MMOL/L — SIGNIFICANT CHANGE UP (ref 98–107)
CO2 SERPL-SCNC: 20 MMOL/L — LOW (ref 22–31)
CREAT SERPL-MCNC: 1.07 MG/DL — SIGNIFICANT CHANGE UP (ref 0.5–1.3)
EGFR: 77 ML/MIN/1.73M2 — SIGNIFICANT CHANGE UP
GLUCOSE BLDC GLUCOMTR-MCNC: 122 MG/DL — HIGH (ref 70–99)
GLUCOSE BLDC GLUCOMTR-MCNC: 157 MG/DL — HIGH (ref 70–99)
GLUCOSE BLDC GLUCOMTR-MCNC: 189 MG/DL — HIGH (ref 70–99)
GLUCOSE BLDC GLUCOMTR-MCNC: 218 MG/DL — HIGH (ref 70–99)
GLUCOSE SERPL-MCNC: 178 MG/DL — HIGH (ref 70–99)
HCT VFR BLD CALC: 45.8 % — SIGNIFICANT CHANGE UP (ref 39–50)
HGB BLD-MCNC: 14.6 G/DL — SIGNIFICANT CHANGE UP (ref 13–17)
MAGNESIUM SERPL-MCNC: 2.2 MG/DL — SIGNIFICANT CHANGE UP (ref 1.6–2.6)
MCHC RBC-ENTMCNC: 25.6 PG — LOW (ref 27–34)
MCHC RBC-ENTMCNC: 31.9 GM/DL — LOW (ref 32–36)
MCV RBC AUTO: 80.4 FL — SIGNIFICANT CHANGE UP (ref 80–100)
NRBC # BLD: 0 /100 WBCS — SIGNIFICANT CHANGE UP (ref 0–0)
NRBC # FLD: 0 K/UL — SIGNIFICANT CHANGE UP (ref 0–0)
PHOSPHATE SERPL-MCNC: 3.9 MG/DL — SIGNIFICANT CHANGE UP (ref 2.5–4.5)
PLATELET # BLD AUTO: 190 K/UL — SIGNIFICANT CHANGE UP (ref 150–400)
POTASSIUM SERPL-MCNC: 4.2 MMOL/L — SIGNIFICANT CHANGE UP (ref 3.5–5.3)
POTASSIUM SERPL-SCNC: 4.2 MMOL/L — SIGNIFICANT CHANGE UP (ref 3.5–5.3)
RBC # BLD: 5.7 M/UL — SIGNIFICANT CHANGE UP (ref 4.2–5.8)
RBC # FLD: 14.9 % — HIGH (ref 10.3–14.5)
SODIUM SERPL-SCNC: 135 MMOL/L — SIGNIFICANT CHANGE UP (ref 135–145)
WBC # BLD: 6.58 K/UL — SIGNIFICANT CHANGE UP (ref 3.8–10.5)
WBC # FLD AUTO: 6.58 K/UL — SIGNIFICANT CHANGE UP (ref 3.8–10.5)

## 2023-07-17 PROCEDURE — 99222 1ST HOSP IP/OBS MODERATE 55: CPT

## 2023-07-17 RX ORDER — METOPROLOL TARTRATE 50 MG
12.5 TABLET ORAL
Refills: 0 | Status: DISCONTINUED | OUTPATIENT
Start: 2023-07-17 | End: 2023-07-17

## 2023-07-17 RX ORDER — ACETAMINOPHEN 500 MG
650 TABLET ORAL EVERY 6 HOURS
Refills: 0 | Status: DISCONTINUED | OUTPATIENT
Start: 2023-07-17 | End: 2023-07-19

## 2023-07-17 RX ORDER — TAMSULOSIN HYDROCHLORIDE 0.4 MG/1
0.4 CAPSULE ORAL AT BEDTIME
Refills: 0 | Status: DISCONTINUED | OUTPATIENT
Start: 2023-07-17 | End: 2023-07-17

## 2023-07-17 RX ORDER — ATORVASTATIN CALCIUM 80 MG/1
40 TABLET, FILM COATED ORAL AT BEDTIME
Refills: 0 | Status: DISCONTINUED | OUTPATIENT
Start: 2023-07-17 | End: 2023-07-19

## 2023-07-17 RX ORDER — INSULIN LISPRO 100/ML
VIAL (ML) SUBCUTANEOUS
Refills: 0 | Status: DISCONTINUED | OUTPATIENT
Start: 2023-07-17 | End: 2023-07-19

## 2023-07-17 RX ORDER — PANTOPRAZOLE SODIUM 20 MG/1
40 TABLET, DELAYED RELEASE ORAL
Refills: 0 | Status: DISCONTINUED | OUTPATIENT
Start: 2023-07-17 | End: 2023-07-19

## 2023-07-17 RX ORDER — ASPIRIN/CALCIUM CARB/MAGNESIUM 324 MG
81 TABLET ORAL DAILY
Refills: 0 | Status: DISCONTINUED | OUTPATIENT
Start: 2023-07-17 | End: 2023-07-19

## 2023-07-17 RX ORDER — OMEPRAZOLE 10 MG/1
0 CAPSULE, DELAYED RELEASE ORAL
Qty: 0 | Refills: 3 | DISCHARGE

## 2023-07-17 RX ORDER — METOPROLOL TARTRATE 50 MG
12.5 TABLET ORAL
Qty: 0 | Refills: 0 | DISCHARGE
Start: 2023-07-17

## 2023-07-17 RX ORDER — HEPARIN SODIUM 5000 [USP'U]/ML
5000 INJECTION INTRAVENOUS; SUBCUTANEOUS EVERY 8 HOURS
Refills: 0 | Status: DISCONTINUED | OUTPATIENT
Start: 2023-07-17 | End: 2023-07-18

## 2023-07-17 RX ORDER — ONDANSETRON 8 MG/1
4 TABLET, FILM COATED ORAL EVERY 8 HOURS
Refills: 0 | Status: DISCONTINUED | OUTPATIENT
Start: 2023-07-17 | End: 2023-07-19

## 2023-07-17 RX ORDER — AMLODIPINE BESYLATE 2.5 MG/1
10 TABLET ORAL DAILY
Refills: 0 | Status: DISCONTINUED | OUTPATIENT
Start: 2023-07-17 | End: 2023-07-17

## 2023-07-17 RX ORDER — LANOLIN ALCOHOL/MO/W.PET/CERES
3 CREAM (GRAM) TOPICAL AT BEDTIME
Refills: 0 | Status: DISCONTINUED | OUTPATIENT
Start: 2023-07-17 | End: 2023-07-19

## 2023-07-17 RX ORDER — PANTOPRAZOLE SODIUM 20 MG/1
40 TABLET, DELAYED RELEASE ORAL
Refills: 0 | Status: DISCONTINUED | OUTPATIENT
Start: 2023-07-17 | End: 2023-07-17

## 2023-07-17 RX ORDER — PANTOPRAZOLE SODIUM 20 MG/1
1 TABLET, DELAYED RELEASE ORAL
Qty: 0 | Refills: 0 | DISCHARGE
Start: 2023-07-17

## 2023-07-17 RX ORDER — METOPROLOL TARTRATE 50 MG
12.5 TABLET ORAL
Refills: 0 | Status: DISCONTINUED | OUTPATIENT
Start: 2023-07-17 | End: 2023-07-19

## 2023-07-17 RX ORDER — AMLODIPINE BESYLATE 2.5 MG/1
1 TABLET ORAL
Qty: 0 | Refills: 0 | DISCHARGE
Start: 2023-07-17

## 2023-07-17 RX ADMIN — Medication 81 MILLIGRAM(S): at 12:31

## 2023-07-17 RX ADMIN — Medication 12.5 MILLIGRAM(S): at 17:56

## 2023-07-17 RX ADMIN — HEPARIN SODIUM 5000 UNIT(S): 5000 INJECTION INTRAVENOUS; SUBCUTANEOUS at 05:24

## 2023-07-17 RX ADMIN — Medication 1: at 08:30

## 2023-07-17 RX ADMIN — Medication 1: at 12:30

## 2023-07-17 RX ADMIN — ATORVASTATIN CALCIUM 40 MILLIGRAM(S): 80 TABLET, FILM COATED ORAL at 20:57

## 2023-07-17 RX ADMIN — Medication 12.5 MILLIGRAM(S): at 07:58

## 2023-07-17 RX ADMIN — HEPARIN SODIUM 5000 UNIT(S): 5000 INJECTION INTRAVENOUS; SUBCUTANEOUS at 17:56

## 2023-07-17 RX ADMIN — HEPARIN SODIUM 5000 UNIT(S): 5000 INJECTION INTRAVENOUS; SUBCUTANEOUS at 21:01

## 2023-07-17 RX ADMIN — AMLODIPINE BESYLATE 10 MILLIGRAM(S): 2.5 TABLET ORAL at 12:33

## 2023-07-17 NOTE — H&P ADULT - NSHPREVIEWOFSYSTEMS_GEN_ALL_CORE
General: No Weight change/ Fatigue/ HA/Dizzy	    Skin/Breast: No Rashes/ Lesions/ Masses  	  Ophthalmologic: No Blurry vision/ Glaucoma/ Blindness  	  ENMT: No Hearing loss/ Drainage/ Lesions	    Respiratory and Thorax: No Cough/ Wheezing/ + exertional SOB/ Hemoptysis/ Sputum production  	  Cardiovascular: +  Chest pain/ Palpitations/ Diaphoresis	    Gastrointestinal: No Nausea/ Vomiting/ Constipation/ Appetite Change	    Genitourinary: No Hematuria/ Dysuria/ Frequency change/ Impotence	    Musculoskeletal: No Pain/ Weakness/ Claudication	    Neurological: No Seizures/ TIA/CVA/ Parastesias	    Psychiatric: No Dementia/ Depression/ SI/HI	    Hematology/Lymphatics: No hx of bleeding/ Edema	    Endocrine: No Hyperglycemia/ Hypoglycemia    Allergic/Immunologic: No Anaphylaxis/ Intolerance/ Recent illnesses

## 2023-07-17 NOTE — H&P ADULT - ASSESSMENT
67-year-old male with NIDDM2, HTN, HLD, asthma, CPAP on LUCRECIA, sent to the ED by Dr. Hart for work-up of EKG abnormalities. pain is described as a pressure over left and right anterior chest wall, radiating to R shoulder, 10/10 in severity, lasting 15-30 minutes at a time, worse with exertion, associated with diaphoresis and without any associated nausea, shortness of breath or palpitations.   Cardiac cath done on 7/14/23 showed - LHC: pLAD 90%, D1 95%, mLCx 90%, mRCA 90%, LVEDP 15, Transferred to [facility] for CABg [place] w/ Dr Hernandez

## 2023-07-17 NOTE — H&P ADULT - NSICDXPASTMEDICALHX_GEN_ALL_CORE_FT
PAST MEDICAL HISTORY:  Asthma     Diabetes mellitus     Hyperlipemia     Hypertension     LUCRECIA on CPAP     Triple vessel disease of the heart

## 2023-07-17 NOTE — PROGRESS NOTE ADULT - ASSESSMENT
_________________________________________________________________________________________  ========>>  M E D I C A L   A T T E N D I N G    F O L L O W  U P  N O T E  <<=========  -----------------------------------------------------------------------------------------------------    - Patient seen and examined by me earlier today.   - In summary,  GILA SHAH is a 67y year old man admitted with CP / CAD   - Patient today overall doing ok, comfortable, eating OK. CP free     ==================>> REVIEW OF SYSTEM <<=================    GEN: no fever, no chills, no pain  RESP: no SOB, no cough, no sputum  CVS: no chest pain, no palpitations, no edema  GI: no abdominal pain, no nausea, no constipation, no diarrhea  : no dysuria, no frequency, no hematuria  Neuro: no headache, no dizziness  Derm : no itching, no rash    ==================>> PHYSICAL EXAM <<=================    GEN: A&O X 3 , NAD , comfortable, pleasant, calm resting in bed > encouraged out of bed to chair with assistance as needed.   HEENT: NCAT, PERRL, MMM, hearing intact  Neck: supple , no JVD appreciated  CVS: S1S2 , regular , No M/R/G appreciated  PULM: CTA B/L,  no W/R/R appreciated  ABD.: soft. non tender, non distended,  bowel sounds present  Extrem: intact pulses , no edema   PSYCH : normal mood,  not anxious                             ( Note written / Date of service 07-17-23 )    ==================>> MEDICATIONS <<====================    amLODIPine   Tablet 10 milliGRAM(s) Oral daily  aspirin enteric coated 81 milliGRAM(s) Oral daily  atorvastatin 40 milliGRAM(s) Oral at bedtime  dextrose 5%. 1000 milliLiter(s) IV Continuous <Continuous>  dextrose 5%. 1000 milliLiter(s) IV Continuous <Continuous>  dextrose 50% Injectable 12.5 Gram(s) IV Push once  dextrose 50% Injectable 25 Gram(s) IV Push once  dextrose 50% Injectable 25 Gram(s) IV Push once  glucagon  Injectable 1 milliGRAM(s) IntraMuscular once  heparin   Injectable 5000 Unit(s) SubCutaneous every 12 hours  insulin lispro (ADMELOG) corrective regimen sliding scale   SubCutaneous at bedtime  insulin lispro (ADMELOG) corrective regimen sliding scale   SubCutaneous three times a day before meals  metoprolol tartrate 12.5 milliGRAM(s) Oral two times a day  pantoprazole    Tablet 40 milliGRAM(s) Oral before breakfast  tamsulosin 0.4 milliGRAM(s) Oral at bedtime    MEDICATIONS  (PRN):  dextrose Oral Gel 15 Gram(s) Oral once PRN Blood Glucose LESS THAN 70 milliGRAM(s)/deciliter    ___________  Active diet:  Diet, Regular:   Consistent Carbohydrate No Snacks (CSTCHO)  DASH/TLC Sodium & Cholesterol Restricted (DASH)  Low Fat (LOWFAT)  Low Sodium  Halal  ___________________    ==================>> VITAL SIGNS <<==================    Vital Signs Last 24 HrsT(C): 36.4 (07-17-23 @ 05:20)  T(F): 97.6 (07-17-23 @ 05:20), Max: 98.5 (07-16-23 @ 13:05)  HR: 73 (07-17-23 @ 05:40) (70 - 75)  BP: 150/95 (07-17-23 @ 05:40)  RR: 17 (07-17-23 @ 05:20) (17 - 18)  SpO2: 99% (07-17-23 @ 05:20) (99% - 100%)      CAPILLARY BLOOD GLUCOSE  POCT Blood Glucose.: 157 mg/dL (17 Jul 2023 08:27)  POCT Blood Glucose.: 169 mg/dL (16 Jul 2023 21:53)  POCT Blood Glucose.: 109 mg/dL (16 Jul 2023 17:00)  POCT Blood Glucose.: 169 mg/dL (16 Jul 2023 12:38)     ==================>> LAB AND IMAGING <<==================                        14.6   6.58  )-----------( 190      ( 17 Jul 2023 06:00 )             45.8        07-17    135  |  102  |  26<H>  ----------------------------<  178<H>  4.2   |  20<L>  |  1.07    Ca    10.2      17 Jul 2023 06:00  Phos  3.9     07-17  Mg     2.20     07-17    WBC count:   6.58 <<== ,  5.34 <<== ,  5.61 <<== ,  4.77 <<== ,  5.09 <<==   Hemoglobin:   14.6 <<==,  14.1 <<==,  14.2 <<==,  13.2 <<==,  13.2 <<==  platelets:  190 <==, 169 <==, 177 <==, 175 <==, 203 <==    Creatinine:  1.07  <<==, 1.02  <<==, 1.04  <<==, 1.02  <<==, 1.05  <<==  Sodium:   135  <==, 138  <==, 137  <==, 139  <==, 140  <==      TSH:      5.39   (07-14-23)           Lipid profile:  (07-14-23)     Total: 131     LDL  : (p)     HDL  :57     TG   :87     HgA1C:   (07-15-23)          (07-15-23)      7.0    < from: Cardiac Catheterization (07.14.23 @ 09:51) >  Procedures:                 1.    Arterial Access - Right Radial   2.    Left Heart Cath   3.    Diagnostic Coronary Angiography   Primary ICD-10: I25.110 - Atherosclerotic heart disease of native  coronary artery with unstable angina pectoris   Secondary ICD-10   E11.59 - Type 2 diabetes mellitus with other circulatory complications  I10 - Essential (primary) hypertension     Diagnostic Conclusions:   Patient has severe triple vessel disease as described with progressive  symptoms of angin    Recommendations:   Revascularization with CABG. Transfer to [facility]   < end of copied text >    ___________________________________________________________________________________  ===============>>  A S S E S S M E N T   A N D   P L A N <<===============  ------------------------------------------------------------------------------------------    · Assessment	  67-year-old male with NIDDM2, HTN, HLD, asthma, CPAP on LUCRECIA, sent to the ED by Dr. Hart for work-up of EKG abnormalities.       Problem/Plan - 1:  ·  Problem: Chest pain in patient with severe CAD.    ·  Plan: Awaiting transfer to [facility] for CABG evaluation ..   check TTE  monitor on tele  c/w ASA, statin.     Problem/Plan - 2:  ·  Problem: Hypoglycemia associated with type 2 diabetes mellitus.   hold oral meds while inpatient  FS Q6H w/ISS.  mop FS     Problem/Plan - 3:  ·  Problem: LUCRECIA on CPAP.   > would need CPAP ordered asia-op as well      Problem/Plan - 4:  ·  Problem: Need for prophylactic measure.   ·  Plan: HSQ for DVT ppx.    --------------------------------------------  Case discussed with patient,   Education given on findings and plan of care  ___________________________  H. MERRY Wilkinson.  Pager: [phone]

## 2023-07-17 NOTE — PROVIDER CONTACT NOTE (OTHER) - ASSESSMENT
patient bp 156/102   patient a&ox4, denies headache, blurred vision, cp, dizziness, lightheadedness, no acute distress noted. vs as per documented  patient reports hasn't been taking any bp medications here.

## 2023-07-17 NOTE — DISCHARGE NOTE NURSING/CASE MANAGEMENT/SOCIAL WORK - NSDCPEFALRISK_GEN_ALL_CORE
For information on Fall & Injury Prevention, visit: [email or web address] OR  [email or web address] OR  https://www.cdc.gov/steadi/patient.html

## 2023-07-17 NOTE — PATIENT PROFILE ADULT - FALL HARM RISK - UNIVERSAL INTERVENTIONS
Bed in lowest position, wheels locked, appropriate side rails in place/Call bell, personal items and telephone in reach/Instruct patient to call for assistance before getting out of bed or chair/Non-slip footwear when patient is out of bed/[place] to call system/Physically safe environment - no spills, clutter or unnecessary equipment/Purposeful Proactive Rounding/Room/bathroom lighting operational, light cord in reach

## 2023-07-17 NOTE — PATIENT PROFILE ADULT - NSPROSPHOSPCHAPLAINYN_GEN_A_NUR
Immunization  Immunization performed in RD by Bel Agosto MA. Patient tolerated the procedure well without complications.  10/21/21   Bel Agosto MA   no

## 2023-07-17 NOTE — H&P ADULT - NS ATTEND AMEND GEN_ALL_CORE FT
Mr. Snow has multivessel and would benefit from CABG surgery.    I have quote him a risk of 1-2% of mortality, stroke, major complications. He understands and wishes to proceed.

## 2023-07-17 NOTE — PROVIDER CONTACT NOTE (OTHER) - ACTION/TREATMENT ORDERED:
provider made aware. no interventions ordered at this time. ask patient to obtain list home bp meds and notify day team of  medications.

## 2023-07-17 NOTE — DISCHARGE NOTE NURSING/CASE MANAGEMENT/SOCIAL WORK - PATIENT PORTAL LINK FT
You can access the FollowMyHealth Patient Portal offered by [facility] by registering at the following website: [email or web address]. By joining [facility]’s FollowMyHealth portal, you will also be able to view your health information using other applications (apps) compatible with our system.

## 2023-07-17 NOTE — PROGRESS NOTE ADULT - SUBJECTIVE AND OBJECTIVE BOX
Cardiovascular Disease Progress Note    Overnight events: No acute events overnight.  no new cardiac sx  Otherwise review of systems negative    Objective Findings:  T(C): 36.4 (07-17-23 @ 05:20), Max: 36.9 (07-16-23 @ 13:05)  HR: 73 (07-17-23 @ 05:40) (70 - 75)  BP: 150/95 (07-17-23 @ 05:40) (126/75 - 156/102)  RR: 17 (07-17-23 @ 05:20) (17 - 18)  SpO2: 99% (07-17-23 @ 05:20) (99% - 100%)  Wt(kg): --  Daily     Daily       Physical Exam:  Gen: NAD  HEENT: EOMI  CV: RRR, normal S1 + S2, no m/r/g  Lungs: CTAB  Abd: soft, non-tender  Ext: No edema    Telemetry:    Laboratory Data:                        14.6   6.58  )-----------( 190      ( 17 Jul 2023 06:00 )             45.8     07-17    135  |  102  |  26<H>  ----------------------------<  178<H>  4.2   |  20<L>  |  1.07    Ca    10.2      17 Jul 2023 06:00  Phos  3.9     07-17  Mg     2.20     07-17                Inpatient Medications:  MEDICATIONS  (STANDING):  aspirin enteric coated 81 milliGRAM(s) Oral daily  atorvastatin 40 milliGRAM(s) Oral at bedtime  dextrose 5%. 1000 milliLiter(s) (100 mL/Hr) IV Continuous <Continuous>  dextrose 5%. 1000 milliLiter(s) (50 mL/Hr) IV Continuous <Continuous>  dextrose 50% Injectable 12.5 Gram(s) IV Push once  dextrose 50% Injectable 25 Gram(s) IV Push once  dextrose 50% Injectable 25 Gram(s) IV Push once  glucagon  Injectable 1 milliGRAM(s) IntraMuscular once  heparin   Injectable 5000 Unit(s) SubCutaneous every 12 hours  insulin lispro (ADMELOG) corrective regimen sliding scale   SubCutaneous at bedtime  insulin lispro (ADMELOG) corrective regimen sliding scale   SubCutaneous three times a day before meals      Assessment:  67-year-old male with NIDDM2, HTN, HLD, asthma, CPAP on LUCRECIA presents with unstable angina    Recs:  cardiac stable  cw asa statin and antianginals  recent TTE with normal LVEF and inferolateral hypokinesis, suspect stunned myocardium, would repeat after revascularization  [facility] with MVD --> pending txf to [facility] for cabg      Over 25 minutes spent on total encounter; more than 50% of the visit was spent counseling and/or coordinating care by the attending physician.      Shaun Nixon MD   Cardiovascular Disease  [phone]
Date of Service  : 07-15-23     INTERVAL HPI/OVERNIGHT EVENTS: I feel fine.   Vital Signs Last 24 Hrs  T(C): 36.7 (15 Jul 2023 19:06), Max: 36.7 (15 Jul 2023 19:06)  T(F): 98 (15 Jul 2023 19:06), Max: 98 (15 Jul 2023 19:06)  HR: 72 (15 Jul 2023 19:06) (67 - 77)  BP: 133/81 (15 Jul 2023 19:06) (132/78 - 147/93)  BP(mean): --  RR: 15 (15 Jul 2023 19:06) (15 - 18)  SpO2: 100% (15 Jul 2023 19:06) (97% - 100%)    Parameters below as of 15 Jul 2023 19:06  Patient On (Oxygen Delivery Method): room air      I&O's Summary    MEDICATIONS  (STANDING):  aspirin enteric coated 81 milliGRAM(s) Oral daily  atorvastatin 40 milliGRAM(s) Oral at bedtime  dextrose 5%. 1000 milliLiter(s) (50 mL/Hr) IV Continuous <Continuous>  dextrose 5%. 1000 milliLiter(s) (100 mL/Hr) IV Continuous <Continuous>  dextrose 50% Injectable 12.5 Gram(s) IV Push once  dextrose 50% Injectable 25 Gram(s) IV Push once  dextrose 50% Injectable 25 Gram(s) IV Push once  glucagon  Injectable 1 milliGRAM(s) IntraMuscular once  heparin   Injectable 5000 Unit(s) SubCutaneous every 12 hours  insulin lispro (ADMELOG) corrective regimen sliding scale   SubCutaneous three times a day before meals  insulin lispro (ADMELOG) corrective regimen sliding scale   SubCutaneous at bedtime    MEDICATIONS  (PRN):  dextrose Oral Gel 15 Gram(s) Oral once PRN Blood Glucose LESS THAN 70 milliGRAM(s)/deciliter    LABS:                        14.2   5.61  )-----------( 177      ( 15 Jul 2023 05:50 )             44.5     07-15    137  |  100  |  18  ----------------------------<  144<H>  4.1   |  21<L>  |  1.04    Ca    9.7      15 Jul 2023 05:50  Phos  3.5     07-15  Mg     2.30     07-15    TPro  7.7  /  Alb  4.6  /  TBili  0.4  /  DBili  x   /  AST  33  /  ALT  23  /  AlkPhos  84  07-13      Urinalysis Basic - ( 15 Jul 2023 05:50 )    Color: x / Appearance: x / SG: x / pH: x  Gluc: 144 mg/dL / Ketone: x  / Bili: x / Urobili: x   Blood: x / Protein: x / Nitrite: x   Leuk Esterase: x / RBC: x / WBC x   Sq Epi: x / Non Sq Epi: x / Bacteria: x      CAPILLARY BLOOD GLUCOSE      POCT Blood Glucose.: 130 mg/dL (15 Jul 2023 17:29)  POCT Blood Glucose.: 141 mg/dL (15 Jul 2023 11:53)  POCT Blood Glucose.: 143 mg/dL (15 Jul 2023 08:08)  POCT Blood Glucose.: 115 mg/dL (14 Jul 2023 21:52)        Urinalysis Basic - ( 15 Jul 2023 05:50 )    Color: x / Appearance: x / SG: x / pH: x  Gluc: 144 mg/dL / Ketone: x  / Bili: x / Urobili: x   Blood: x / Protein: x / Nitrite: x   Leuk Esterase: x / RBC: x / WBC x   Sq Epi: x / Non Sq Epi: x / Bacteria: x      REVIEW OF SYSTEMS:  CONSTITUTIONAL: No fever, weight loss, or fatigue  EYES: No eye pain, visual disturbances, or discharge  ENMT:  No difficulty hearing, tinnitus, vertigo; No sinus or throat pain  NECK: No pain or stiffness  RESPIRATORY: No cough, wheezing, chills or hemoptysis; No shortness of breath  CARDIOVASCULAR: No chest pain, palpitations, dizziness, or leg swelling  GASTROINTESTINAL: No abdominal or epigastric pain. No nausea, vomiting, or hematemesis; No diarrhea or constipation. No melena or hematochezia.  GENITOURINARY: No dysuria, frequency, hematuria, or incontinence  NEUROLOGICAL: No headaches, memory loss, loss of strength, numbness, or tremors      RADIOLOGY & ADDITIONAL TESTS:    Consultant(s) Notes Reviewed:  [x ] YES  [ ] NO    PHYSICAL EXAM:  GENERAL: NAD, well-groomed, well-developed,not in any distress ,  HEAD:  Atraumatic, Normocephalic  EYES: EOMI, PERRLA, conjunctiva and sclera clear  ENMT: No tonsillar erythema, exudates, or enlargement; Moist mucous membranes, Good dentition, No lesions  NECK: Supple, No JVD, Normal thyroid  NERVOUS SYSTEM:  Alert & Oriented X3, No focal deficit   CHEST/LUNG: Good air entry bilateral with no  rales, rhonchi, wheezing, or rubs  HEART: Regular rate and rhythm; No murmurs, rubs, or gallops  ABDOMEN: Soft, Nontender, Nondistended; Bowel sounds present  EXTREMITIES:  2+ Peripheral Pulses, No clubbing, cyanosis, or edema  SKIN: No rashes or lesions    Care Discussed with Consultants/Other Providers [ x] YES  [ ] NO

## 2023-07-17 NOTE — H&P ADULT - HISTORY OF PRESENT ILLNESS
67-year-old male with NIDDM2, HTN, HLD, asthma, CPAP on LUCRECIA, sent to the ED by Dr. Hart for work-up of EKG abnormalities. pain is described as a pressure over left and right anterior chest wall, radiating to R shoulder, 10/10 in severity, lasting 15-30 minutes at a time, worse with exertion, associated with diaphoresis and without any associated nausea, shortness of breath or palpitations. He denies any history of CAD, prior cath/echo or stress testing. He reports prior w/u at [facility] however unsure of results or testing performed  Cardiac cath done on 7/14/23 showed - [facility]: pLAD 90%, D1 95%, mLCx 90%, mRCA 90%, LVEDP 15, Transferred to [facility] for CABg eval w/ Dr Hall

## 2023-07-17 NOTE — H&P ADULT - NSHPPHYSICALEXAM_GEN_ALL_CORE
General: Age-appearing, in no acute distress  Head: Normochephalic, atraumatic  ENMT: EOMI, neck supple  Cardiovascular: +S1, S2; Regular rate and rhythm, no murmurs, rubs, gallops  Respiratory: CTA BL, no wheezes, rales, rhonchi  Gastrointestinal: Abdomen soft, non-tender, +BS in all 4 quadrants  Extremities: No clubbing, cyanosis, or edema  Vascular: 2+ pulses, cap refill < 2 seconds  Neuro: Non-focal, AAOx4, sensation intact BL  Musculoskeletal: Normal tone, no deformities  Skin: Warm, dry; no acute rash seen  Psych: Appropriate, cooperative

## 2023-07-18 ENCOUNTER — TRANSCRIPTION ENCOUNTER (OUTPATIENT)
Age: 68
End: 2023-07-18

## 2023-07-18 LAB
ALBUMIN SERPL ELPH-MCNC: 4.3 G/DL — SIGNIFICANT CHANGE UP (ref 3.3–5)
ALP SERPL-CCNC: 94 U/L — SIGNIFICANT CHANGE UP (ref 40–120)
ALT FLD-CCNC: 42 U/L — SIGNIFICANT CHANGE UP (ref 10–45)
ANION GAP SERPL CALC-SCNC: 17 MMOL/L — SIGNIFICANT CHANGE UP (ref 5–17)
APPEARANCE UR: CLEAR — SIGNIFICANT CHANGE UP
APTT BLD: 30.2 SEC — SIGNIFICANT CHANGE UP (ref 27.5–35.5)
AST SERPL-CCNC: 27 U/L — SIGNIFICANT CHANGE UP (ref 10–40)
BILIRUB DIRECT SERPL-MCNC: 0.2 MG/DL — SIGNIFICANT CHANGE UP (ref 0–0.3)
BILIRUB INDIRECT FLD-MCNC: 0.7 MG/DL — SIGNIFICANT CHANGE UP (ref 0.2–1)
BILIRUB SERPL-MCNC: 0.9 MG/DL — SIGNIFICANT CHANGE UP (ref 0.2–1.2)
BILIRUB UR-MCNC: NEGATIVE — SIGNIFICANT CHANGE UP
BLD GP AB SCN SERPL QL: NEGATIVE — SIGNIFICANT CHANGE UP
BUN SERPL-MCNC: 24 MG/DL — HIGH (ref 7–23)
CALCIUM SERPL-MCNC: 9.9 MG/DL — SIGNIFICANT CHANGE UP (ref 8.4–10.5)
CHLORIDE SERPL-SCNC: 101 MMOL/L — SIGNIFICANT CHANGE UP (ref 96–108)
CO2 SERPL-SCNC: 19 MMOL/L — LOW (ref 22–31)
COLOR SPEC: SIGNIFICANT CHANGE UP
CREAT SERPL-MCNC: 1.07 MG/DL — SIGNIFICANT CHANGE UP (ref 0.5–1.3)
DIFF PNL FLD: NEGATIVE — SIGNIFICANT CHANGE UP
EGFR: 77 ML/MIN/1.73M2 — SIGNIFICANT CHANGE UP
FIBRINOGEN PPP-MCNC: 430 MG/DL — SIGNIFICANT CHANGE UP (ref 200–445)
FIBRINOGEN PPP-MCNC: 468 MG/DL — HIGH (ref 200–445)
GLUCOSE BLDC GLUCOMTR-MCNC: 150 MG/DL — HIGH (ref 70–99)
GLUCOSE BLDC GLUCOMTR-MCNC: 166 MG/DL — HIGH (ref 70–99)
GLUCOSE BLDC GLUCOMTR-MCNC: 175 MG/DL — HIGH (ref 70–99)
GLUCOSE BLDC GLUCOMTR-MCNC: 260 MG/DL — HIGH (ref 70–99)
GLUCOSE SERPL-MCNC: 144 MG/DL — HIGH (ref 70–99)
GLUCOSE UR QL: ABNORMAL
HAV IGM SER-ACNC: SIGNIFICANT CHANGE UP
HBV CORE IGM SER-ACNC: SIGNIFICANT CHANGE UP
HBV SURFACE AG SER-ACNC: SIGNIFICANT CHANGE UP
HCT VFR BLD CALC: 45.8 % — SIGNIFICANT CHANGE UP (ref 39–50)
HCV AB S/CO SERPL IA: 0.08 S/CO — SIGNIFICANT CHANGE UP (ref 0–0.99)
HCV AB SERPL-IMP: SIGNIFICANT CHANGE UP
HGB BLD-MCNC: 14.5 G/DL — SIGNIFICANT CHANGE UP (ref 13–17)
INR BLD: 1.12 RATIO — SIGNIFICANT CHANGE UP (ref 0.88–1.16)
KETONES UR-MCNC: NEGATIVE — SIGNIFICANT CHANGE UP
LEUKOCYTE ESTERASE UR-ACNC: NEGATIVE — SIGNIFICANT CHANGE UP
MCHC RBC-ENTMCNC: 26.4 PG — LOW (ref 27–34)
MCHC RBC-ENTMCNC: 31.7 GM/DL — LOW (ref 32–36)
MCV RBC AUTO: 83.4 FL — SIGNIFICANT CHANGE UP (ref 80–100)
MRSA PCR RESULT.: SIGNIFICANT CHANGE UP
NITRITE UR-MCNC: NEGATIVE — SIGNIFICANT CHANGE UP
NRBC # BLD: 0 /100 WBCS — SIGNIFICANT CHANGE UP (ref 0–0)
NT-PROBNP SERPL-SCNC: 466 PG/ML — HIGH (ref 0–300)
PA ADP PRP-ACNC: 235 PRU — SIGNIFICANT CHANGE UP (ref 194–417)
PH UR: 6 — SIGNIFICANT CHANGE UP (ref 5–8)
PLATELET # BLD AUTO: 182 K/UL — SIGNIFICANT CHANGE UP (ref 150–400)
POTASSIUM SERPL-MCNC: 4.1 MMOL/L — SIGNIFICANT CHANGE UP (ref 3.5–5.3)
POTASSIUM SERPL-SCNC: 4.1 MMOL/L — SIGNIFICANT CHANGE UP (ref 3.5–5.3)
PROT SERPL-MCNC: 7.8 G/DL — SIGNIFICANT CHANGE UP (ref 6–8.3)
PROT UR-MCNC: SIGNIFICANT CHANGE UP
PROTHROM AB SERPL-ACNC: 12.9 SEC — SIGNIFICANT CHANGE UP (ref 10.5–13.4)
RBC # BLD: 5.49 M/UL — SIGNIFICANT CHANGE UP (ref 4.2–5.8)
RBC # FLD: 14.8 % — HIGH (ref 10.3–14.5)
RH IG SCN BLD-IMP: POSITIVE — SIGNIFICANT CHANGE UP
S AUREUS DNA NOSE QL NAA+PROBE: DETECTED
SODIUM SERPL-SCNC: 137 MMOL/L — SIGNIFICANT CHANGE UP (ref 135–145)
SP GR SPEC: 1.01 — SIGNIFICANT CHANGE UP (ref 1.01–1.02)
T3 SERPL-MCNC: 163 NG/DL — SIGNIFICANT CHANGE UP (ref 80–200)
T4 AB SER-ACNC: 10.3 UG/DL — SIGNIFICANT CHANGE UP (ref 4.6–12)
TSH SERPL-MCNC: 9.02 UIU/ML — HIGH (ref 0.27–4.2)
UROBILINOGEN FLD QL: NEGATIVE — SIGNIFICANT CHANGE UP
WBC # BLD: 6.48 K/UL — SIGNIFICANT CHANGE UP (ref 3.8–10.5)
WBC # FLD AUTO: 6.48 K/UL — SIGNIFICANT CHANGE UP (ref 3.8–10.5)

## 2023-07-18 PROCEDURE — 94010 BREATHING CAPACITY TEST: CPT | Mod: 26

## 2023-07-18 PROCEDURE — 93010 ELECTROCARDIOGRAM REPORT: CPT

## 2023-07-18 PROCEDURE — 93880 EXTRACRANIAL BILAT STUDY: CPT | Mod: 26

## 2023-07-18 PROCEDURE — 71045 X-RAY EXAM CHEST 1 VIEW: CPT | Mod: 26

## 2023-07-18 PROCEDURE — 93306 TTE W/DOPPLER COMPLETE: CPT | Mod: 26

## 2023-07-18 PROCEDURE — 99222 1ST HOSP IP/OBS MODERATE 55: CPT

## 2023-07-18 RX ORDER — INSULIN LISPRO 100/ML
3 VIAL (ML) SUBCUTANEOUS
Refills: 0 | Status: DISCONTINUED | OUTPATIENT
Start: 2023-07-18 | End: 2023-07-19

## 2023-07-18 RX ORDER — INSULIN GLARGINE 100 [IU]/ML
5 INJECTION, SOLUTION SUBCUTANEOUS AT BEDTIME
Refills: 0 | Status: DISCONTINUED | OUTPATIENT
Start: 2023-07-18 | End: 2023-07-19

## 2023-07-18 RX ORDER — CEFUROXIME AXETIL 250 MG
1500 TABLET ORAL ONCE
Refills: 0 | Status: DISCONTINUED | OUTPATIENT
Start: 2023-07-18 | End: 2023-07-19

## 2023-07-18 RX ORDER — CHLORHEXIDINE GLUCONATE 213 G/1000ML
1 SOLUTION TOPICAL ONCE
Refills: 0 | Status: COMPLETED | OUTPATIENT
Start: 2023-07-18 | End: 2023-07-18

## 2023-07-18 RX ORDER — MUPIROCIN 20 MG/G
1 OINTMENT TOPICAL
Refills: 0 | Status: DISCONTINUED | OUTPATIENT
Start: 2023-07-18 | End: 2023-07-19

## 2023-07-18 RX ORDER — ACETAMINOPHEN 500 MG
1000 TABLET ORAL ONCE
Refills: 0 | Status: COMPLETED | OUTPATIENT
Start: 2023-07-18 | End: 2023-07-19

## 2023-07-18 RX ORDER — LEVOTHYROXINE SODIUM 125 MCG
25 TABLET ORAL DAILY
Refills: 0 | Status: DISCONTINUED | OUTPATIENT
Start: 2023-07-18 | End: 2023-07-19

## 2023-07-18 RX ORDER — GABAPENTIN 400 MG/1
300 CAPSULE ORAL ONCE
Refills: 0 | Status: COMPLETED | OUTPATIENT
Start: 2023-07-18 | End: 2023-07-19

## 2023-07-18 RX ORDER — CHLORHEXIDINE GLUCONATE 213 G/1000ML
30 SOLUTION TOPICAL ONCE
Refills: 0 | Status: COMPLETED | OUTPATIENT
Start: 2023-07-18 | End: 2023-07-19

## 2023-07-18 RX ORDER — ASCORBIC ACID 60 MG
2000 TABLET,CHEWABLE ORAL ONCE
Refills: 0 | Status: COMPLETED | OUTPATIENT
Start: 2023-07-18 | End: 2023-07-18

## 2023-07-18 RX ADMIN — PANTOPRAZOLE SODIUM 40 MILLIGRAM(S): 20 TABLET, DELAYED RELEASE ORAL at 05:32

## 2023-07-18 RX ADMIN — Medication 6: at 12:20

## 2023-07-18 RX ADMIN — HEPARIN SODIUM 5000 UNIT(S): 5000 INJECTION INTRAVENOUS; SUBCUTANEOUS at 22:53

## 2023-07-18 RX ADMIN — Medication 650 MILLIGRAM(S): at 10:26

## 2023-07-18 RX ADMIN — CHLORHEXIDINE GLUCONATE 1 APPLICATION(S): 213 SOLUTION TOPICAL at 20:40

## 2023-07-18 RX ADMIN — ATORVASTATIN CALCIUM 40 MILLIGRAM(S): 80 TABLET, FILM COATED ORAL at 22:49

## 2023-07-18 RX ADMIN — Medication 12.5 MILLIGRAM(S): at 22:46

## 2023-07-18 RX ADMIN — MUPIROCIN 1 APPLICATION(S): 20 OINTMENT TOPICAL at 17:00

## 2023-07-18 RX ADMIN — Medication 3 UNIT(S): at 08:00

## 2023-07-18 RX ADMIN — HEPARIN SODIUM 5000 UNIT(S): 5000 INJECTION INTRAVENOUS; SUBCUTANEOUS at 13:00

## 2023-07-18 RX ADMIN — HEPARIN SODIUM 5000 UNIT(S): 5000 INJECTION INTRAVENOUS; SUBCUTANEOUS at 05:32

## 2023-07-18 RX ADMIN — Medication 12.5 MILLIGRAM(S): at 10:26

## 2023-07-18 RX ADMIN — Medication 81 MILLIGRAM(S): at 10:46

## 2023-07-18 RX ADMIN — Medication 650 MILLIGRAM(S): at 11:20

## 2023-07-18 RX ADMIN — Medication 3 UNIT(S): at 16:58

## 2023-07-18 RX ADMIN — INSULIN GLARGINE 5 UNIT(S): 100 INJECTION, SOLUTION SUBCUTANEOUS at 22:53

## 2023-07-18 RX ADMIN — Medication 2: at 16:57

## 2023-07-18 RX ADMIN — Medication 3 UNIT(S): at 12:20

## 2023-07-18 RX ADMIN — Medication 2000 MILLIGRAM(S): at 22:50

## 2023-07-18 NOTE — PRE PROCEDURE NOTE - PRE PROCEDURE EVALUATION
Cardiac Surgery Pre-op Note:    CC: Patient is a 67y old  Male who presents with a chief complaint of 3 VCAD now scheduld for  CABG in AM with Dr. Hall      Referring Physician: Dr Nixon                                                                                                            Surgeon: Dr Hall    Procedure: 7/19 CABG    Allergies    No Known Allergies    Intolerances        HPI:  67-year-old male with NIDDM2, HTN, HLD, asthma, CPAP on LUCRECIA, sent to the ED by Dr. Hart for work-up of EKG abnormalities. pain is described as a pressure over left and right anterior chest wall, radiating to R shoulder, 10/10 in severity, lasting 15-30 minutes at a time, worse with exertion, associated with diaphoresis and without any associated nausea, shortness of breath or palpitations. He denies any history of CAD, prior cath/echo or stress testing. He reports prior w/u at [facility] however unsure of results or testing performed  Cardiac cath done on 7/14/23 showed - [facility]: pLAD 90%, D1 95%, mLCx 90%, mRCA 90%, LVEDP 15, Transferred to [facility] for CABg eval w/ Dr Hall   (17 Jul 2023 19:34)      PAST MEDICAL & SURGICAL HISTORY:  Hypertension      Hyperlipemia      Diabetes mellitus      Asthma      LUCRECIA on CPAP      Triple vessel disease of the heart      History of appendectomy          MEDICATIONS  (STANDING):  acetaminophen     Tablet .. 1000 milliGRAM(s) Oral once  ascorbic acid 2000 milliGRAM(s) Oral once  aspirin  chewable 81 milliGRAM(s) Oral daily  atorvastatin 40 milliGRAM(s) Oral at bedtime  cefuroxime  IVPB 1500 milliGRAM(s) IV Intermittent once  chlorhexidine 0.12% Liquid 30 milliLiter(s) Swish and Spit once  chlorhexidine 4% Liquid 1 Application(s) Topical once  gabapentin 300 milliGRAM(s) Oral once  heparin   Injectable 5000 Unit(s) SubCutaneous every 8 hours  insulin glargine Injectable (LANTUS) 5 Unit(s) SubCutaneous at bedtime  insulin lispro (ADMELOG) corrective regimen sliding scale   SubCutaneous three times a day before meals  insulin lispro Injectable (ADMELOG) 3 Unit(s) SubCutaneous before dinner  insulin lispro Injectable (ADMELOG) 3 Unit(s) SubCutaneous before breakfast  insulin lispro Injectable (ADMELOG) 3 Unit(s) SubCutaneous before lunch  metoprolol tartrate 12.5 milliGRAM(s) Oral two times a day  mupirocin 2% Ointment 1 Application(s) Both Nostrils two times a day  pantoprazole    Tablet 40 milliGRAM(s) Oral before breakfast    MEDICATIONS  (PRN):  acetaminophen     Tablet .. 650 milliGRAM(s) Oral every 6 hours PRN Temp greater or equal to 38C (100.4F), Mild Pain (1 - 3)  aluminum hydroxide/magnesium hydroxide/simethicone Suspension 30 milliLiter(s) Oral every 4 hours PRN Dyspepsia  melatonin 3 milliGRAM(s) Oral at bedtime PRN Insomnia  ondansetron Injectable 4 milliGRAM(s) IV Push every 8 hours PRN Nausea and/or Vomiting    T(C): 36.4 (07-18-23 @ 13:04), Max: 36.5 (07-18-23 @ 04:10)  T(F): 97.6 (07-18-23 @ 13:04), Max: 97.7 (07-18-23 @ 04:10)  HR: 50 (07-18-23 @ 13:04) (50 - 68)  BP: 129/77 (07-18-23 @ 13:04) (129/77 - 156/85)  RR: 18 (07-18-23 @ 13:04) (16 - 18)  SpO2: 99% (07-18-23 @ 13:04) (96% - 100%)  weight  79kg    height 162  cm        Labs:                        14.5   6.48  )-----------( 182      ( 18 Jul 2023 06:18 )             45.8     07-18    137  |  101  |  24<H>  ----------------------------<  144<H>  4.1   |  19<L>  |  1.07    Ca    9.9      18 Jul 2023 06:18  Phos  3.9     07-17  Mg     2.20     07-17    TPro  7.8  /  Alb  4.3  /  TBili  0.9  /  DBili  0.2  /  AST  27  /  ALT  42  /  AlkPhos  94  07-18    PT/INR - ( 18 Jul 2023 10:49 )   PT: 12.9 sec;   INR: 1.12 ratio         PTT - ( 18 Jul 2023 10:49 )  PTT:30.2 sec    Blood Type: ABO Interpretation: O (07-18 @ 11:27)    HGB A1C: 7.0  Thyroid Panel: 07-18 @ 06:18/9.02  --/10.3/163  07-14 @ 05:44/5.39  --/--/--    MRSA: MRSA PCR Result.: NotDetec (07-17 @ 20:50)   / MSSA:   Urinalysis Basic - ( 18 Jul 2023 06:18 )    Color: x / Appearance: x / SG: x / pH: x  Gluc: 144 mg/dL / Ketone: x  / Bili: x / Urobili: x   Blood: x / Protein: x / Nitrite: x   Leuk Esterase: x / RBC: x / WBC x   Sq Epi: x / Non Sq Epi: x / Bacteria: x        CXR: t< from: Xray Chest 1 View- PORTABLE-Urgent (07.13.23 @ 22:37) >  IMPRESSION:    No focal consolidation.    --- End of Report ---    < end of copied text >  < from: VA Duplex Carotid, Bilat (07.18.23 @ 10:17) >    IMPRESSION: No significant hemodynamic stenosis of either carotid artery.      < end of copied text >  < from: TTE W or WO Ultrasound Enhancing Agent (07.18.23 @ 08:37) >      1. Normal left ventricular cavity size. The left ventricular wall thickness is normal. The left ventricular systolic function is normal with an ejection fraction of 65 % by Black's method of disks. There are regional wall motion abnormalities No evidence of a thrombus in the left ventricle.   2. Mid inferolateral segment and mid anterolateral segment are abnormal.      Mid inferolateral segment and mid anterolateral segment are abnormal.   3. Normal atria.   4. Normal right ventricular cavity size, normal right ventricular wall thickness and normal right ventricular systolic function. The tricuspid annular plane systolic excursion (TAPSE) is 2.1 cm (normal >=1.7 cm).   5. No significant valvular disease.   6. No pericardial effusion seen.   7. No prior echocardiogram is available for comparison.      < end of copied text >  < from: 12 Lead ECG (07.18.23 @ 04:23) >    Diagnosis Line NORMAL SINUS RHYTHM  T WAVE ABNORMALITY, CONSIDER LATERAL ISCHEMIA  ABNORMAL ECG  NO PREVIOUS ECGS AVAILABLE  Confirmed by BILL Laird Zlata ([id number]) on 7/18/2023 2:38:14 PM    < end of copied text   PFT's:        Cardiac catheterization:[facility]: pLAD 90%, D1 95%, mLCx 90%, mRCA 90%, LVEDP 15,         Gen: WN/WD NAD  Neuro: AAOx3, nonfocal  Pulm: CTA B/L  CV: RRR, S1S2  Abd: Soft, NT, ND +BS  Ext: No edema, + peripheral pulses      Pt has AICD/PPM [ ] Yes  [x ] No             Brand Name:  Pre-op Beta Blocker ordered within 24 hrs of surgery (CABG ONLY)?  [ x] Yes metopropol 12.5 bid  Type & Cross  [x ] Yes  [ ] No  NPO after Midnight [x ] Yes  [ ] No  Pre-op ABX ordered, to be taped on chart:  [x ] Yes  [ ] No     Hibiclens/Peridex ordered [ x] Yes  [ ] No  Intraop on Hold: PRBCs, CXR, SEBASTIÁN [x ]   Consent obtained  [x ] Yes  [ ] No

## 2023-07-18 NOTE — PROGRESS NOTE ADULT - ASSESSMENT
_________________________________________________________________________________________  ========>>  M E D I C A L   A T T E N D I N G    F O L L O W  U P  N O T E  <<=========  -----------------------------------------------------------------------------------------------------    - Patient seen and examined by me earlier today.  patient transferred to [facility] for CABG ..  - In summary,  GILA SHAH is a 67y year old man admitted with CP / CAD   - Patient today overall doing ok, comfortable, eating OK. CP free      ==================>> REVIEW OF SYSTEM <<=================    GEN: no fever, no chills, no pain  RESP: no SOB, no cough, no sputum  CVS: no chest pain, no palpitations, no edema  GI: no abdominal pain, no nausea, no constipation, no diarrhea  : no dysuria, no frequency, no hematuria  Neuro: no headache, no dizziness  Derm : no itching, no rash    ==================>> PHYSICAL EXAM <<=================    GEN: A&O X 3 , NAD , comfortable, pleasant, calm resting in bed > encouraged out of bed to chair with assistance as needed.   HEENT: NCAT, PERRL, MMM, hearing intact  Neck: supple , no JVD appreciated  CVS: S1S2 , regular , No M/R/G appreciated  PULM: CTA B/L,  no W/R/R appreciated  ABD.: soft. non tender, non distended,  bowel sounds present  Extrem: intact pulses , no edema   PSYCH : normal mood,  not anxious                             ( note written / Date of service   07-18-23 )    ==================>> MEDICATIONS <<====================    acetaminophen     Tablet .. 1000 milliGRAM(s) Oral once  ascorbic acid 2000 milliGRAM(s) Oral once  aspirin  chewable 81 milliGRAM(s) Oral daily  atorvastatin 40 milliGRAM(s) Oral at bedtime  cefuroxime  IVPB 1500 milliGRAM(s) IV Intermittent once  chlorhexidine 0.12% Liquid 30 milliLiter(s) Swish and Spit once  chlorhexidine 4% Liquid 1 Application(s) Topical once  gabapentin 300 milliGRAM(s) Oral once  heparin   Injectable 5000 Unit(s) SubCutaneous every 8 hours  insulin glargine Injectable (LANTUS) 5 Unit(s) SubCutaneous at bedtime  insulin lispro (ADMELOG) corrective regimen sliding scale   SubCutaneous three times a day before meals  insulin lispro Injectable (ADMELOG) 3 Unit(s) SubCutaneous before breakfast  insulin lispro Injectable (ADMELOG) 3 Unit(s) SubCutaneous before dinner  insulin lispro Injectable (ADMELOG) 3 Unit(s) SubCutaneous before lunch  metoprolol tartrate 12.5 milliGRAM(s) Oral two times a day  mupirocin 2% Ointment 1 Application(s) Both Nostrils two times a day  pantoprazole    Tablet 40 milliGRAM(s) Oral before breakfast    MEDICATIONS  (PRN):  acetaminophen     Tablet .. 650 milliGRAM(s) Oral every 6 hours PRN Temp greater or equal to 38C (100.4F), Mild Pain (1 - 3)  aluminum hydroxide/magnesium hydroxide/simethicone Suspension 30 milliLiter(s) Oral every 4 hours PRN Dyspepsia  melatonin 3 milliGRAM(s) Oral at bedtime PRN Insomnia  ondansetron Injectable 4 milliGRAM(s) IV Push every 8 hours PRN Nausea and/or Vomiting    ___________  Active diet:  Diet, NPO after Midnight:      NPO Start Date: 18-Jul-2023,   NPO Start Time: 23:59  Except Medications     Special Instructions for Nursing:  Except Medications  Diet, Consistent Carbohydrate/No Snacks:   Halal  No Beef  ___________________    ==================>> VITAL SIGNS <<==================     Height (cm): 162.6  Weight (kg): 79  BMI (kg/m2): 29.9  Vital Signs Last 24 HrsT(C): 36.5 (07-18-23 @ 10:15)  T(F): 97.7 (07-18-23 @ 10:15), Max: 97.7 (07-18-23 @ 04:10)  HR: 65 (07-18-23 @ 10:15) (58 - 65)  BP: 149/77 (07-18-23 @ 10:15)  RR: 16 (07-18-23 @ 10:15) (16 - 18)  SpO2: 96% (07-18-23 @ 10:15) (96% - 99%)      POCT Blood Glucose.: 260 mg/dL (18 Jul 2023 11:26)  POCT Blood Glucose.: 166 mg/dL (18 Jul 2023 07:31)  POCT Blood Glucose.: 218 mg/dL (17 Jul 2023 21:34)  POCT Blood Glucose.: 122 mg/dL (17 Jul 2023 17:25)  POCT Blood Glucose.: 189 mg/dL (17 Jul 2023 12:12)     ==================>> LAB AND IMAGING <<==================                        14.5   6.48  )-----------( 182      ( 18 Jul 2023 06:18 )             45.8        07-18    137  |  101  |  24<H>  ----------------------------<  144<H>  4.1   |  19<L>  |  1.07    Ca    9.9      18 Jul 2023 06:18  Phos  3.9     07-17  Mg     2.20     07-17    TPro  7.8  /  Alb  4.3  /  TBili  0.9  /  DBili  0.2  /  AST  27  /  ALT  42  /  AlkPhos  94  07-18    TSH:      5.39   (07-14-23)           Lipid profile:  (07-14-23)     Total: 131     LDL  : (p)     HDL  :57     TG   :87     HgA1C:   (07-15-23)          (07-15-23)      7.0    < from: TTE W or WO Ultrasound Enhancing Agent (07.18.23 @ 08:37) >  CONCLUSIONS:    1. Normal left ventricular cavity size. The left ventricular wall thickness is normal. The left ventricular systolic function is normal with an ejection fraction of 65 % by Black's method of disks. There are regional wall motion abnormalities No evidence of a thrombus in the left ventricle.   2. Mid inferolateral segment and mid anterolateral segment are abnormal.      Mid inferolateral segment and mid anterolateral segment are abnormal.   3. Normal atria.   4. Normal right ventricular cavity size, normal right ventricular wall thickness and normal right ventricular systolic function. The tricuspid annular plane systolic excursion (TAPSE) is 2.1 cm (normal >=1.7 cm).   5. No significant valvular disease.   6. No pericardial effusion seen.   7. No prior echocardiogram is available for comparison.  < end of copied text >    < from: Cardiac Catheterization (07.14.23 @ 09:51) >  Procedures:                 1.    Arterial Access - Right Radial   2.    Left Heart Cath   3.    Diagnostic Coronary Angiography   Primary ICD-10: I25.110 - Atherosclerotic heart disease of native  coronary artery with unstable angina pectoris   Secondary ICD-10   E11.59 - Type 2 diabetes mellitus with other circulatory complications  I10 - Essential (primary) hypertension     Diagnostic Conclusions:   Patient has severe triple vessel disease as described with progressive  symptoms of angin    Recommendations:   Revascularization with CABG. Transfer to [facility]   < end of copied text >    ___________________________________________________________________________________  ===============>>  A S S E S S M E N T   A N D   P L A N <<===============  ------------------------------------------------------------------------------------------    · Assessment	  67-year-old male with NIDDM2, HTN, HLD, asthma, CPAP on LUCRECIA, sent to the ED by Dr. Hart for work-up of EKG abnormalities.       Problem/Plan - 1:  ·  Problem: Chest pain in patient with severe CAD.     transfered to [facility] for CABG   TTE and cath as above   monitor on tele  c/w ASA, statin.  med optimization per cardiologist  completing CABG evaluation workup ..      Problem/Plan - 2:  ·  Problem: type 2 diabetes mellitus.   hold oral meds while inpatient  FS Q6H w/ISS.  monitor  FS closely asia- op  endocrine evaluation as needed      Problem/Plan - 3:  ·  Problem: LUCRECIA on CPAP.   > would need CPAP ordered asia-op as well     --------------------------------------------  Case discussed with patient, RN..   Education given on findings and plan of care  ___________________________  H. MERRY Wilkinson.  Pager: [phone]

## 2023-07-18 NOTE — PRE-ANESTHESIA EVALUATION ADULT - RX
Discharge Summary/Instructions after an Endoscopic Procedure  Patient Name: Yvette Zhu  Patient MRN: [id number]  Patient YOB: 1947 Monday, June 6, 2022  Darien Ledesma MD  Dear patient,  As a result of recent federal legislation (The Federal Cures Act), you may   receive lab or pathology results from your procedure in your [facility]   account before your physician is able to contact you. Your physician or   their representative will relay the results to you with their   recommendations at their soonest availability.  Thank you,  Your health is very important to us during the Covid Crisis. Following your   procedure today, you will receive a daily text for 2 weeks asking about   signs or symptoms of Covid 19.  Please respond to this text when you   receive it so we can follow up and keep you as safe as possible.   RESTRICTIONS:  During your procedure today, you received medications for sedation.  These   medications may affect your judgment, balance and coordination.  Therefore,   for 24 hours, you have the following restrictions:   - DO NOT drive a car, operate machinery, make legal/financial decisions,   sign important papers or drink alcohol.    ACTIVITY:  Today: no heavy lifting, straining or running due to procedural   sedation/anesthesia.  The following day: return to full activity including work.  DIET:  Eat and drink normally unless instructed otherwise.     TREATMENT FOR COMMON SIDE EFFECTS:  - Mild abdominal pain, nausea, belching, bloating or excessive gas:  rest,   eat lightly and use a heating pad.  - Sore Throat: treat with throat lozenges and/or gargle with warm salt   water.  - Because air was used during the procedure, expelling large amounts of air   from your rectum or belching is normal.  - If a bowel prep was taken, you may not have a bowel movement for 1-3 days.    This is normal.  SYMPTOMS TO WATCH FOR AND REPORT TO YOUR PHYSICIAN:  1. Abdominal pain or bloating, other than gas  cramps.  2. Chest pain.  3. Back pain.  4. Signs of infection such as: chills or fever occurring within 24 hours   after the procedure.  5. Rectal bleeding, which would show as bright red, maroon, or black stools.   (A tablespoon of blood from the rectum is not serious, especially if   hemorrhoids are present.)  6. Vomiting.  7. Weakness or dizziness.  GO DIRECTLY TO THE NEAREST EMERGENCY ROOM IF YOU HAVE ANY OF THE FOLLOWING:      Difficulty breathing              Chills and/or fever over 101 F   Persistent vomiting and/or vomiting blood   Severe abdominal pain   Severe chest pain   Black, tarry stools   Bleeding- more than one tablespoon   Any other symptom or condition that you feel may need urgent attention  Your doctor recommends these additional instructions:  If any biopsies were taken, your doctors clinic will contact you in 1 to 2   weeks with any results.  - Follow-up H. pylori biopsies. Treat for eradictaion if positive.   - RUQ US for evaluation of biliary dyskinesia.   - Continue Bentyl 10 mg BID as needed due to 50% improvement. I will   consider IBgard then TCA next .   - Discharge patient to home.  For questions, problems or results please call your physician - Darien Ledesma MD.  EMERGENCY PHONE NUMBER: [phone],  LAB RESULTS: [phone]  IF A COMPLICATION OR EMERGENCY SITUATION ARISES AND YOU ARE UNABLE TO REACH   YOUR PHYSICIAN - GO DIRECTLY TO THE EMERGENCY ROOM.  MD Darien Briones MD  6/6/2022 10:02:32 AM  This report has been verified and signed electronically.  Dear patient,  As a result of recent federal legislation (The Federal Cures Act), you may   receive lab or pathology results from your procedure in your [facility]   account before your physician is able to contact you. Your physician or   their representative will relay the results to you with their   recommendations at their soonest availability.  Thank you,  [employer]   CPAP

## 2023-07-18 NOTE — PROGRESS NOTE ADULT - SUBJECTIVE AND OBJECTIVE BOX
Cardiovascular Disease Progress Note    Overnight events: No acute events overnight.  no cp/sob/palps/dizziness  Otherwise review of systems negative    Objective Findings:  T(C): 36.5 (07-18-23 @ 04:10), Max: 36.5 (07-17-23 @ 13:52)  HR: 64 (07-18-23 @ 04:10) (58 - 68)  BP: 131/74 (07-18-23 @ 04:10) (131/74 - 156/85)  RR: 18 (07-18-23 @ 04:10) (17 - 18)  SpO2: 98% (07-18-23 @ 04:10) (98% - 100%)  Wt(kg): --  Daily Height in cm: 162.56 (17 Jul 2023 19:20)    Daily       Physical Exam:  Gen: NAD  HEENT: EOMI  CV: RRR, normal S1 + S2, no m/r/g  Lungs: CTAB  Abd: soft, non-tender  Ext: No edema    Telemetry:    Laboratory Data:                        14.5   6.48  )-----------( 182      ( 18 Jul 2023 06:18 )             45.8     07-18    137  |  101  |  24<H>  ----------------------------<  144<H>  4.1   |  19<L>  |  1.07    Ca    9.9      18 Jul 2023 06:18  Phos  3.9     07-17  Mg     2.20     07-17    TPro  7.8  /  Alb  4.3  /  TBili  0.9  /  DBili  0.2  /  AST  27  /  ALT  42  /  AlkPhos  94  07-18              Inpatient Medications:  MEDICATIONS  (STANDING):  acetaminophen     Tablet .. 1000 milliGRAM(s) Oral once  ascorbic acid 2000 milliGRAM(s) Oral once  aspirin  chewable 81 milliGRAM(s) Oral daily  atorvastatin 40 milliGRAM(s) Oral at bedtime  cefuroxime  IVPB 1500 milliGRAM(s) IV Intermittent once  chlorhexidine 0.12% Liquid 30 milliLiter(s) Swish and Spit once  chlorhexidine 4% Liquid 1 Application(s) Topical once  gabapentin 300 milliGRAM(s) Oral once  heparin   Injectable 5000 Unit(s) SubCutaneous every 8 hours  insulin glargine Injectable (LANTUS) 5 Unit(s) SubCutaneous at bedtime  insulin lispro (ADMELOG) corrective regimen sliding scale   SubCutaneous three times a day before meals  insulin lispro Injectable (ADMELOG) 3 Unit(s) SubCutaneous before dinner  insulin lispro Injectable (ADMELOG) 3 Unit(s) SubCutaneous before breakfast  insulin lispro Injectable (ADMELOG) 3 Unit(s) SubCutaneous before lunch  metoprolol tartrate 12.5 milliGRAM(s) Oral two times a day  mupirocin 2% Ointment 1 Application(s) Both Nostrils two times a day  pantoprazole    Tablet 40 milliGRAM(s) Oral before breakfast      Assessment:  67-year-old male with NIDDM2, HTN, HLD, asthma, CPAP on LUCRECIA presents with unstable angina    Recs:  cardiac stable  cw asa statin and antianginals  recent TTE with normal LVEF and inferolateral hypokinesis, suspect stunned myocardium, would repeat after revascularization  [facility] with MVD --> pending cabg with Dr Hall      Over 25 minutes spent on total encounter; more than 50% of the visit was spent counseling and/or coordinating care by the attending physician.      Shaun Nixon MD   Cardiovascular Disease  [phone]

## 2023-07-19 ENCOUNTER — APPOINTMENT (OUTPATIENT)
Dept: CARDIOTHORACIC SURGERY | Facility: CLINIC | Age: 68
End: 2023-07-19

## 2023-07-19 ENCOUNTER — TRANSCRIPTION ENCOUNTER (OUTPATIENT)
Age: 68
End: 2023-07-19

## 2023-07-19 DIAGNOSIS — I25.10 ATHEROSCLEROTIC HEART DISEASE OF NATIVE CORONARY ARTERY WITHOUT ANGINA PECTORIS: ICD-10-CM

## 2023-07-19 DIAGNOSIS — E78.5 HYPERLIPIDEMIA, UNSPECIFIED: ICD-10-CM

## 2023-07-19 DIAGNOSIS — I10 ESSENTIAL (PRIMARY) HYPERTENSION: ICD-10-CM

## 2023-07-19 PROBLEM — Z00.00 ENCOUNTER FOR PREVENTIVE HEALTH EXAMINATION: Status: ACTIVE | Noted: 2023-07-19

## 2023-07-19 LAB
ALBUMIN SERPL ELPH-MCNC: 3.1 G/DL — LOW (ref 3.3–5)
ALP SERPL-CCNC: 59 U/L — SIGNIFICANT CHANGE UP (ref 40–120)
ALT FLD-CCNC: 31 U/L — SIGNIFICANT CHANGE UP (ref 10–45)
ANION GAP SERPL CALC-SCNC: 11 MMOL/L — SIGNIFICANT CHANGE UP (ref 5–17)
APTT BLD: 28.1 SEC — SIGNIFICANT CHANGE UP (ref 27.5–35.5)
AST SERPL-CCNC: 59 U/L — HIGH (ref 10–40)
BASE EXCESS BLDV CALC-SCNC: -0.9 MMOL/L — SIGNIFICANT CHANGE UP (ref -2–3)
BASE EXCESS BLDV CALC-SCNC: -2.8 MMOL/L — LOW (ref -2–3)
BASE EXCESS BLDV CALC-SCNC: -3 MMOL/L — LOW (ref -2–3)
BASE EXCESS BLDV CALC-SCNC: -3.9 MMOL/L — LOW (ref -2–3)
BASE EXCESS BLDV CALC-SCNC: -4.7 MMOL/L — LOW (ref -2–3)
BASE EXCESS BLDV CALC-SCNC: -6.7 MMOL/L — LOW (ref -2–3)
BASOPHILS # BLD AUTO: 0.02 K/UL — SIGNIFICANT CHANGE UP (ref 0–0.2)
BASOPHILS NFR BLD AUTO: 0.3 % — SIGNIFICANT CHANGE UP (ref 0–2)
BILIRUB SERPL-MCNC: 1.4 MG/DL — HIGH (ref 0.2–1.2)
BLOOD GAS VENOUS - CREATININE: SIGNIFICANT CHANGE UP MG/DL (ref 0.5–1.3)
BUN SERPL-MCNC: 17 MG/DL — SIGNIFICANT CHANGE UP (ref 7–23)
CA-I SERPL-SCNC: 0.72 MMOL/L — LOW (ref 1.15–1.33)
CA-I SERPL-SCNC: 0.74 MMOL/L — LOW (ref 1.15–1.33)
CA-I SERPL-SCNC: 0.81 MMOL/L — LOW (ref 1.15–1.33)
CA-I SERPL-SCNC: 0.87 MMOL/L — LOW (ref 1.15–1.33)
CA-I SERPL-SCNC: 0.94 MMOL/L — LOW (ref 1.15–1.33)
CA-I SERPL-SCNC: 1.22 MMOL/L — SIGNIFICANT CHANGE UP (ref 1.15–1.33)
CALCIUM SERPL-MCNC: 8.3 MG/DL — LOW (ref 8.4–10.5)
CHLORIDE BLDV-SCNC: 101 MMOL/L — SIGNIFICANT CHANGE UP (ref 96–108)
CHLORIDE BLDV-SCNC: 103 MMOL/L — SIGNIFICANT CHANGE UP (ref 96–108)
CHLORIDE BLDV-SCNC: 104 MMOL/L — SIGNIFICANT CHANGE UP (ref 96–108)
CHLORIDE BLDV-SCNC: 107 MMOL/L — SIGNIFICANT CHANGE UP (ref 96–108)
CHLORIDE BLDV-SCNC: 111 MMOL/L — HIGH (ref 96–108)
CHLORIDE BLDV-SCNC: 114 MMOL/L — HIGH (ref 96–108)
CHLORIDE SERPL-SCNC: 112 MMOL/L — HIGH (ref 96–108)
CK MB BLD-MCNC: 4 % — HIGH (ref 0–3.5)
CK MB CFR SERPL CALC: 50.7 NG/ML — HIGH (ref 0–6.7)
CK SERPL-CCNC: 1252 U/L — HIGH (ref 30–200)
CO2 BLDV-SCNC: 19 MMOL/L — LOW (ref 22–26)
CO2 BLDV-SCNC: 24 MMOL/L — SIGNIFICANT CHANGE UP (ref 22–26)
CO2 BLDV-SCNC: 24 MMOL/L — SIGNIFICANT CHANGE UP (ref 22–26)
CO2 BLDV-SCNC: 25 MMOL/L — SIGNIFICANT CHANGE UP (ref 22–26)
CO2 BLDV-SCNC: 25 MMOL/L — SIGNIFICANT CHANGE UP (ref 22–26)
CO2 BLDV-SCNC: 26 MMOL/L — SIGNIFICANT CHANGE UP (ref 22–26)
CO2 SERPL-SCNC: 24 MMOL/L — SIGNIFICANT CHANGE UP (ref 22–31)
CREAT SERPL-MCNC: 0.96 MG/DL — SIGNIFICANT CHANGE UP (ref 0.5–1.3)
EGFR: 88 ML/MIN/1.73M2 — SIGNIFICANT CHANGE UP
EOSINOPHIL # BLD AUTO: 0.02 K/UL — SIGNIFICANT CHANGE UP (ref 0–0.5)
EOSINOPHIL NFR BLD AUTO: 0.3 % — SIGNIFICANT CHANGE UP (ref 0–6)
FIBRINOGEN PPP-MCNC: 297 MG/DL — SIGNIFICANT CHANGE UP (ref 200–445)
GAS PNL BLDA: SIGNIFICANT CHANGE UP
GAS PNL BLDV: 132 MMOL/L — LOW (ref 136–145)
GAS PNL BLDV: 133 MMOL/L — LOW (ref 136–145)
GAS PNL BLDV: 133 MMOL/L — LOW (ref 136–145)
GAS PNL BLDV: 136 MMOL/L — SIGNIFICANT CHANGE UP (ref 136–145)
GAS PNL BLDV: 138 MMOL/L — SIGNIFICANT CHANGE UP (ref 136–145)
GAS PNL BLDV: 141 MMOL/L — SIGNIFICANT CHANGE UP (ref 136–145)
GAS PNL BLDV: SIGNIFICANT CHANGE UP
GLUCOSE BLDC GLUCOMTR-MCNC: 108 MG/DL — HIGH (ref 70–99)
GLUCOSE BLDC GLUCOMTR-MCNC: 109 MG/DL — HIGH (ref 70–99)
GLUCOSE BLDC GLUCOMTR-MCNC: 115 MG/DL — HIGH (ref 70–99)
GLUCOSE BLDC GLUCOMTR-MCNC: 117 MG/DL — HIGH (ref 70–99)
GLUCOSE BLDC GLUCOMTR-MCNC: 137 MG/DL — HIGH (ref 70–99)
GLUCOSE BLDC GLUCOMTR-MCNC: 144 MG/DL — HIGH (ref 70–99)
GLUCOSE BLDC GLUCOMTR-MCNC: 145 MG/DL — HIGH (ref 70–99)
GLUCOSE BLDC GLUCOMTR-MCNC: 160 MG/DL — HIGH (ref 70–99)
GLUCOSE BLDV-MCNC: 118 MG/DL — HIGH (ref 70–99)
GLUCOSE BLDV-MCNC: 159 MG/DL — HIGH (ref 70–99)
GLUCOSE BLDV-MCNC: 159 MG/DL — HIGH (ref 70–99)
GLUCOSE BLDV-MCNC: 165 MG/DL — HIGH (ref 70–99)
GLUCOSE BLDV-MCNC: 173 MG/DL — HIGH (ref 70–99)
GLUCOSE BLDV-MCNC: 180 MG/DL — HIGH (ref 70–99)
GLUCOSE SERPL-MCNC: 174 MG/DL — HIGH (ref 70–99)
HCO3 BLDV-SCNC: 18 MMOL/L — LOW (ref 22–29)
HCO3 BLDV-SCNC: 22 MMOL/L — SIGNIFICANT CHANGE UP (ref 22–29)
HCO3 BLDV-SCNC: 23 MMOL/L — SIGNIFICANT CHANGE UP (ref 22–29)
HCO3 BLDV-SCNC: 23 MMOL/L — SIGNIFICANT CHANGE UP (ref 22–29)
HCO3 BLDV-SCNC: 24 MMOL/L — SIGNIFICANT CHANGE UP (ref 22–29)
HCO3 BLDV-SCNC: 25 MMOL/L — SIGNIFICANT CHANGE UP (ref 22–29)
HCT VFR BLD CALC: 30.4 % — LOW (ref 39–50)
HCT VFR BLDA CALC: 21 % — CRITICAL LOW (ref 39–51)
HCT VFR BLDA CALC: 23 % — LOW (ref 39–51)
HCT VFR BLDA CALC: 24 % — LOW (ref 39–51)
HCT VFR BLDA CALC: 25 % — LOW (ref 39–51)
HCT VFR BLDA CALC: 27 % — LOW (ref 39–51)
HCT VFR BLDA CALC: 27 % — LOW (ref 39–51)
HGB BLD CALC-MCNC: 7.1 G/DL — LOW (ref 12.6–17.4)
HGB BLD CALC-MCNC: 7.7 G/DL — LOW (ref 12.6–17.4)
HGB BLD CALC-MCNC: 8.1 G/DL — LOW (ref 12.6–17.4)
HGB BLD CALC-MCNC: 8.4 G/DL — LOW (ref 12.6–17.4)
HGB BLD CALC-MCNC: 8.9 G/DL — LOW (ref 12.6–17.4)
HGB BLD CALC-MCNC: 8.9 G/DL — LOW (ref 12.6–17.4)
HGB BLD-MCNC: 10.1 G/DL — LOW (ref 13–17)
IMM GRANULOCYTES NFR BLD AUTO: 1 % — HIGH (ref 0–0.9)
INR BLD: 1.17 RATIO — HIGH (ref 0.88–1.16)
LACTATE BLDV-MCNC: 1 MMOL/L — SIGNIFICANT CHANGE UP (ref 0.5–2)
LACTATE BLDV-MCNC: 1.3 MMOL/L — SIGNIFICANT CHANGE UP (ref 0.5–2)
LACTATE BLDV-MCNC: 1.3 MMOL/L — SIGNIFICANT CHANGE UP (ref 0.5–2)
LACTATE BLDV-MCNC: 1.4 MMOL/L — SIGNIFICANT CHANGE UP (ref 0.5–2)
LACTATE BLDV-MCNC: 1.5 MMOL/L — SIGNIFICANT CHANGE UP (ref 0.5–2)
LACTATE BLDV-MCNC: 1.7 MMOL/L — SIGNIFICANT CHANGE UP (ref 0.5–2)
LYMPHOCYTES # BLD AUTO: 1.05 K/UL — SIGNIFICANT CHANGE UP (ref 1–3.3)
LYMPHOCYTES # BLD AUTO: 14.4 % — SIGNIFICANT CHANGE UP (ref 13–44)
MCHC RBC-ENTMCNC: 27.7 PG — SIGNIFICANT CHANGE UP (ref 27–34)
MCHC RBC-ENTMCNC: 33.2 GM/DL — SIGNIFICANT CHANGE UP (ref 32–36)
MCV RBC AUTO: 83.5 FL — SIGNIFICANT CHANGE UP (ref 80–100)
MONOCYTES # BLD AUTO: 0.65 K/UL — SIGNIFICANT CHANGE UP (ref 0–0.9)
MONOCYTES NFR BLD AUTO: 8.9 % — SIGNIFICANT CHANGE UP (ref 2–14)
NEUTROPHILS # BLD AUTO: 5.5 K/UL — SIGNIFICANT CHANGE UP (ref 1.8–7.4)
NEUTROPHILS NFR BLD AUTO: 75.1 % — SIGNIFICANT CHANGE UP (ref 43–77)
NRBC # BLD: 0 /100 WBCS — SIGNIFICANT CHANGE UP (ref 0–0)
OTHER CELLS CSF MANUAL: 10 ML/DL — LOW (ref 18–22)
PCO2 BLDV: 33 MMHG — LOW (ref 42–55)
PCO2 BLDV: 41 MMHG — LOW (ref 42–55)
PCO2 BLDV: 45 MMHG — SIGNIFICANT CHANGE UP (ref 42–55)
PCO2 BLDV: 48 MMHG — SIGNIFICANT CHANGE UP (ref 42–55)
PCO2 BLDV: 49 MMHG — SIGNIFICANT CHANGE UP (ref 42–55)
PCO2 BLDV: 53 MMHG — SIGNIFICANT CHANGE UP (ref 42–55)
PH BLDV: 7.25 — LOW (ref 7.32–7.43)
PH BLDV: 7.27 — LOW (ref 7.32–7.43)
PH BLDV: 7.29 — LOW (ref 7.32–7.43)
PH BLDV: 7.35 — SIGNIFICANT CHANGE UP (ref 7.32–7.43)
PLATELET # BLD AUTO: 155 K/UL — SIGNIFICANT CHANGE UP (ref 150–400)
PO2 BLDV: 73 MMHG — HIGH (ref 25–45)
PO2 BLDV: 77 MMHG — HIGH (ref 25–45)
PO2 BLDV: 77 MMHG — HIGH (ref 25–45)
PO2 BLDV: 79 MMHG — HIGH (ref 25–45)
PO2 BLDV: 82 MMHG — HIGH (ref 25–45)
PO2 BLDV: 83 MMHG — HIGH (ref 25–45)
POTASSIUM BLDV-SCNC: 5.9 MMOL/L — HIGH (ref 3.5–5.1)
POTASSIUM BLDV-SCNC: 6 MMOL/L — HIGH (ref 3.5–5.1)
POTASSIUM BLDV-SCNC: 6.5 MMOL/L — CRITICAL HIGH (ref 3.5–5.1)
POTASSIUM BLDV-SCNC: 6.6 MMOL/L — CRITICAL HIGH (ref 3.5–5.1)
POTASSIUM BLDV-SCNC: 7.3 MMOL/L — CRITICAL HIGH (ref 3.5–5.1)
POTASSIUM BLDV-SCNC: 7.5 MMOL/L — CRITICAL HIGH (ref 3.5–5.1)
POTASSIUM SERPL-MCNC: 5.3 MMOL/L — SIGNIFICANT CHANGE UP (ref 3.5–5.3)
POTASSIUM SERPL-SCNC: 5.3 MMOL/L — SIGNIFICANT CHANGE UP (ref 3.5–5.3)
PROT SERPL-MCNC: 5.1 G/DL — LOW (ref 6–8.3)
PROTHROM AB SERPL-ACNC: 13.6 SEC — HIGH (ref 10.5–13.4)
RBC # BLD: 3.64 M/UL — LOW (ref 4.2–5.8)
RBC # FLD: 15.3 % — HIGH (ref 10.3–14.5)
SAO2 % BLDV: 94.1 % — HIGH (ref 67–88)
SAO2 % BLDV: 95.6 % — HIGH (ref 67–88)
SAO2 % BLDV: 96.3 % — HIGH (ref 67–88)
SAO2 % BLDV: 96.4 % — HIGH (ref 67–88)
SAO2 % BLDV: 96.7 % — HIGH (ref 67–88)
SAO2 % BLDV: 97.8 % — HIGH (ref 67–88)
SODIUM SERPL-SCNC: 147 MMOL/L — HIGH (ref 135–145)
TROPONIN T, HIGH SENSITIVITY RESULT: 961 NG/L — HIGH (ref 0–51)
WBC # BLD: 7.31 K/UL — SIGNIFICANT CHANGE UP (ref 3.8–10.5)
WBC # FLD AUTO: 7.31 K/UL — SIGNIFICANT CHANGE UP (ref 3.8–10.5)

## 2023-07-19 PROCEDURE — 33509 NDSC HRV UXTR ART 1 SGM CAB: CPT | Mod: LT

## 2023-07-19 PROCEDURE — 93010 ELECTROCARDIOGRAM REPORT: CPT

## 2023-07-19 PROCEDURE — 99291 CRITICAL CARE FIRST HOUR: CPT

## 2023-07-19 PROCEDURE — 33534 CABG ARTERIAL TWO: CPT

## 2023-07-19 PROCEDURE — 33508 ENDOSCOPIC VEIN HARVEST: CPT | Mod: 59

## 2023-07-19 PROCEDURE — 71045 X-RAY EXAM CHEST 1 VIEW: CPT | Mod: 26

## 2023-07-19 PROCEDURE — 33519 CABG ARTERY-VEIN THREE: CPT

## 2023-07-19 DEVICE — LIGATING CLIPS WECK HORIZON MEDIUM (BLUE) 24: Type: IMPLANTABLE DEVICE | Status: FUNCTIONAL

## 2023-07-19 DEVICE — CHEST DRAIN THORACIC ARGYLE PVC 28FR STRAIGHT: Type: IMPLANTABLE DEVICE | Status: FUNCTIONAL

## 2023-07-19 DEVICE — CANNULA ARTERIAL OPTISITE 22FR X 3/8" VENTED: Type: IMPLANTABLE DEVICE | Status: FUNCTIONAL

## 2023-07-19 DEVICE — KIT A-LINE 1LUM 20G X 12CM SAFE KIT: Type: IMPLANTABLE DEVICE | Status: FUNCTIONAL

## 2023-07-19 DEVICE — LIGATING CLIPS WECK HORIZON SMALL-WIDE (RED) 24: Type: IMPLANTABLE DEVICE | Status: FUNCTIONAL

## 2023-07-19 DEVICE — PACING WIRE WHITE M-25 WINGED WIRE 37MM X 89MM: Type: IMPLANTABLE DEVICE | Status: FUNCTIONAL

## 2023-07-19 DEVICE — PATCH EVARREST FIBRIN SEALANT 2X4CM: Type: IMPLANTABLE DEVICE | Status: FUNCTIONAL

## 2023-07-19 DEVICE — KIT CVC 2LUM MAC 9FR CHG: Type: IMPLANTABLE DEVICE | Status: FUNCTIONAL

## 2023-07-19 DEVICE — CANNULA ANTEGRADE W/VENT 12GA: Type: IMPLANTABLE DEVICE | Status: FUNCTIONAL

## 2023-07-19 DEVICE — CANNULA VENOUS 2 STAGE THIN FLEX 36/46FR X 1/2" WITH RETURN DIAL: Type: IMPLANTABLE DEVICE | Status: FUNCTIONAL

## 2023-07-19 RX ORDER — ACETAMINOPHEN 500 MG
650 TABLET ORAL EVERY 6 HOURS
Refills: 0 | Status: DISCONTINUED | OUTPATIENT
Start: 2023-07-22 | End: 2023-07-25

## 2023-07-19 RX ORDER — DEXMEDETOMIDINE HYDROCHLORIDE IN 0.9% SODIUM CHLORIDE 4 UG/ML
0.5 INJECTION INTRAVENOUS
Qty: 200 | Refills: 0 | Status: DISCONTINUED | OUTPATIENT
Start: 2023-07-19 | End: 2023-07-19

## 2023-07-19 RX ORDER — ASPIRIN/CALCIUM CARB/MAGNESIUM 324 MG
300 TABLET ORAL ONCE
Refills: 0 | Status: COMPLETED | OUTPATIENT
Start: 2023-07-19

## 2023-07-19 RX ORDER — OXYCODONE HYDROCHLORIDE 5 MG/1
10 TABLET ORAL EVERY 4 HOURS
Refills: 0 | Status: DISCONTINUED | OUTPATIENT
Start: 2023-07-19 | End: 2023-07-21

## 2023-07-19 RX ORDER — NOREPINEPHRINE BITARTRATE/D5W 8 MG/250ML
0.02 PLASTIC BAG, INJECTION (ML) INTRAVENOUS
Qty: 8 | Refills: 0 | Status: DISCONTINUED | OUTPATIENT
Start: 2023-07-19 | End: 2023-07-20

## 2023-07-19 RX ORDER — SODIUM CHLORIDE 9 MG/ML
1000 INJECTION INTRAMUSCULAR; INTRAVENOUS; SUBCUTANEOUS
Refills: 0 | Status: DISCONTINUED | OUTPATIENT
Start: 2023-07-19 | End: 2023-07-25

## 2023-07-19 RX ORDER — POTASSIUM CHLORIDE 20 MEQ
10 PACKET (EA) ORAL
Refills: 0 | Status: DISCONTINUED | OUTPATIENT
Start: 2023-07-19 | End: 2023-07-19

## 2023-07-19 RX ORDER — OXYCODONE HYDROCHLORIDE 5 MG/1
5 TABLET ORAL EVERY 4 HOURS
Refills: 0 | Status: DISCONTINUED | OUTPATIENT
Start: 2023-07-19 | End: 2023-07-21

## 2023-07-19 RX ORDER — AMIODARONE HYDROCHLORIDE 400 MG/1
400 TABLET ORAL
Refills: 0 | Status: DISCONTINUED | OUTPATIENT
Start: 2023-07-19 | End: 2023-07-19

## 2023-07-19 RX ORDER — SENNA PLUS 8.6 MG/1
2 TABLET ORAL AT BEDTIME
Refills: 0 | Status: DISCONTINUED | OUTPATIENT
Start: 2023-07-20 | End: 2023-07-25

## 2023-07-19 RX ORDER — ASCORBIC ACID 60 MG
500 TABLET,CHEWABLE ORAL
Refills: 0 | Status: COMPLETED | OUTPATIENT
Start: 2023-07-19 | End: 2023-07-24

## 2023-07-19 RX ORDER — ASPIRIN/CALCIUM CARB/MAGNESIUM 324 MG
300 TABLET ORAL ONCE
Refills: 0 | Status: COMPLETED | OUTPATIENT
Start: 2023-07-19 | End: 2023-07-19

## 2023-07-19 RX ORDER — ALBUMIN HUMAN 25 %
250 VIAL (ML) INTRAVENOUS ONCE
Refills: 0 | Status: COMPLETED | OUTPATIENT
Start: 2023-07-19 | End: 2023-07-19

## 2023-07-19 RX ORDER — SODIUM CHLORIDE 9 MG/ML
250 INJECTION, SOLUTION INTRAVENOUS ONCE
Refills: 0 | Status: COMPLETED | OUTPATIENT
Start: 2023-07-19 | End: 2023-07-19

## 2023-07-19 RX ORDER — DEXTROSE 50 % IN WATER 50 %
50 SYRINGE (ML) INTRAVENOUS
Refills: 0 | Status: DISCONTINUED | OUTPATIENT
Start: 2023-07-19 | End: 2023-07-19

## 2023-07-19 RX ORDER — ACETAMINOPHEN 500 MG
650 TABLET ORAL EVERY 6 HOURS
Refills: 0 | Status: COMPLETED | OUTPATIENT
Start: 2023-07-19 | End: 2023-07-22

## 2023-07-19 RX ORDER — CALCIUM GLUCONATE 100 MG/ML
2 VIAL (ML) INTRAVENOUS ONCE
Refills: 0 | Status: COMPLETED | OUTPATIENT
Start: 2023-07-19 | End: 2023-07-19

## 2023-07-19 RX ORDER — DEXTROSE 50 % IN WATER 50 %
25 SYRINGE (ML) INTRAVENOUS
Refills: 0 | Status: DISCONTINUED | OUTPATIENT
Start: 2023-07-19 | End: 2023-07-19

## 2023-07-19 RX ORDER — CEFUROXIME AXETIL 250 MG
1500 TABLET ORAL EVERY 8 HOURS
Refills: 0 | Status: COMPLETED | OUTPATIENT
Start: 2023-07-19 | End: 2023-07-21

## 2023-07-19 RX ORDER — GABAPENTIN 400 MG/1
100 CAPSULE ORAL EVERY 8 HOURS
Refills: 0 | Status: DISCONTINUED | OUTPATIENT
Start: 2023-07-19 | End: 2023-07-21

## 2023-07-19 RX ORDER — CHLORHEXIDINE GLUCONATE 213 G/1000ML
15 SOLUTION TOPICAL EVERY 12 HOURS
Refills: 0 | Status: DISCONTINUED | OUTPATIENT
Start: 2023-07-19 | End: 2023-07-19

## 2023-07-19 RX ORDER — CHLORHEXIDINE GLUCONATE 213 G/1000ML
1 SOLUTION TOPICAL DAILY
Refills: 0 | Status: DISCONTINUED | OUTPATIENT
Start: 2023-07-19 | End: 2023-07-25

## 2023-07-19 RX ORDER — PANTOPRAZOLE SODIUM 20 MG/1
40 TABLET, DELAYED RELEASE ORAL DAILY
Refills: 0 | Status: DISCONTINUED | OUTPATIENT
Start: 2023-07-19 | End: 2023-07-20

## 2023-07-19 RX ORDER — HYDROMORPHONE HYDROCHLORIDE 2 MG/ML
0.5 INJECTION INTRAMUSCULAR; INTRAVENOUS; SUBCUTANEOUS EVERY 6 HOURS
Refills: 0 | Status: DISCONTINUED | OUTPATIENT
Start: 2023-07-19 | End: 2023-07-20

## 2023-07-19 RX ORDER — MUPIROCIN 20 MG/G
1 OINTMENT TOPICAL
Refills: 0 | Status: COMPLETED | OUTPATIENT
Start: 2023-07-19 | End: 2023-07-23

## 2023-07-19 RX ORDER — INSULIN HUMAN 100 [IU]/ML
3 INJECTION, SOLUTION SUBCUTANEOUS
Qty: 100 | Refills: 0 | Status: DISCONTINUED | OUTPATIENT
Start: 2023-07-19 | End: 2023-07-24

## 2023-07-19 RX ORDER — POLYETHYLENE GLYCOL 3350 17 G/17G
17 POWDER, FOR SOLUTION ORAL DAILY
Refills: 0 | Status: DISCONTINUED | OUTPATIENT
Start: 2023-07-20 | End: 2023-07-25

## 2023-07-19 RX ORDER — ASPIRIN/CALCIUM CARB/MAGNESIUM 324 MG
81 TABLET ORAL DAILY
Refills: 0 | Status: DISCONTINUED | OUTPATIENT
Start: 2023-07-19 | End: 2023-07-25

## 2023-07-19 RX ADMIN — Medication 12.5 MILLIGRAM(S): at 06:13

## 2023-07-19 RX ADMIN — Medication 200 GRAM(S): at 16:17

## 2023-07-19 RX ADMIN — Medication 2.96 MICROGRAM(S)/KG/MIN: at 16:16

## 2023-07-19 RX ADMIN — SODIUM CHLORIDE 1000 MILLILITER(S): 9 INJECTION, SOLUTION INTRAVENOUS at 16:19

## 2023-07-19 RX ADMIN — MUPIROCIN 1 APPLICATION(S): 20 OINTMENT TOPICAL at 18:24

## 2023-07-19 RX ADMIN — CHLORHEXIDINE GLUCONATE 15 MILLILITER(S): 213 SOLUTION TOPICAL at 18:26

## 2023-07-19 RX ADMIN — Medication 100 MILLIGRAM(S): at 20:05

## 2023-07-19 RX ADMIN — PANTOPRAZOLE SODIUM 40 MILLIGRAM(S): 20 TABLET, DELAYED RELEASE ORAL at 18:24

## 2023-07-19 RX ADMIN — INSULIN HUMAN 3 UNIT(S)/HR: 100 INJECTION, SOLUTION SUBCUTANEOUS at 16:17

## 2023-07-19 RX ADMIN — PANTOPRAZOLE SODIUM 40 MILLIGRAM(S): 20 TABLET, DELAYED RELEASE ORAL at 06:06

## 2023-07-19 RX ADMIN — Medication 1000 MILLIGRAM(S): at 06:12

## 2023-07-19 RX ADMIN — DEXMEDETOMIDINE HYDROCHLORIDE IN 0.9% SODIUM CHLORIDE 9.88 MICROGRAM(S)/KG/HR: 4 INJECTION INTRAVENOUS at 16:16

## 2023-07-19 RX ADMIN — Medication 300 MILLIGRAM(S): at 21:25

## 2023-07-19 RX ADMIN — Medication 1000 MILLIGRAM(S): at 06:06

## 2023-07-19 RX ADMIN — MUPIROCIN 1 APPLICATION(S): 20 OINTMENT TOPICAL at 06:06

## 2023-07-19 RX ADMIN — GABAPENTIN 300 MILLIGRAM(S): 400 CAPSULE ORAL at 06:07

## 2023-07-19 RX ADMIN — Medication 125 MILLILITER(S): at 18:52

## 2023-07-19 RX ADMIN — Medication 25 MICROGRAM(S): at 06:06

## 2023-07-19 RX ADMIN — CHLORHEXIDINE GLUCONATE 30 MILLILITER(S): 213 SOLUTION TOPICAL at 06:06

## 2023-07-19 NOTE — PROGRESS NOTE ADULT - SUBJECTIVE AND OBJECTIVE BOX
Cardiovascular Disease Progress Note    Overnight events: No acute events overnight.  no new cardiac sx  Otherwise review of systems negative    Objective Findings:  T(C): 36.4 (23 @ 04:17), Max: 36.7 (23 @ 20:04)  HR: 59 (23 @ 04:17) (50 - 65)  BP: 134/74 (23 @ 04:17) (129/77 - 156/76)  RR: 18 (23 @ 04:17) (16 - 18)  SpO2: 95% (23 @ 04:17) (95% - 99%)  Wt(kg): --  Daily Height in cm: 162.56 (2023 19:40)    Daily Weight in k.9 (2023 07:30)      Physical Exam:  Gen: NAD  HEENT: EOMI  CV: RRR, normal S1 + S2, no m/r/g  Lungs: CTAB  Abd: soft, non-tender  Ext: No edema    Telemetry:    Laboratory Data:                        14.5   6.48  )-----------( 182      ( 2023 06:18 )             45.8     07-18    137  |  101  |  24<H>  ----------------------------<  144<H>  4.1   |  19<L>  |  1.07    Ca    9.9      2023 06:18    TPro  7.8  /  Alb  4.3  /  TBili  0.9  /  DBili  0.2  /  AST  27  /  ALT  42  /  AlkPhos  94  07-18    PT/INR - ( 2023 10:49 )   PT: 12.9 sec;   INR: 1.12 ratio         PTT - ( 2023 10:49 )  PTT:30.2 sec          Inpatient Medications:  MEDICATIONS  (STANDING):      Assessment:  67-year-old male with NIDDM2, HTN, HLD, asthma, CPAP on LUCRECIA presents with unstable angina    Recs:  cardiac stable  cw asa statin and antianginals  TTE with normal LVEF and + WMA, suspect stunned myocardium, would repeat after revascularization  [facility] with MVD --> for cabg with Dr Hall today        Over 25 minutes spent on total encounter; more than 50% of the visit was spent counseling and/or coordinating care by the attending physician.      Shaun Nixon MD   Cardiovascular Disease  [phone]

## 2023-07-19 NOTE — CONSULT NOTE ADULT - ASSESSMENT
Assessment  DMT2: 67y Male with DM T2 with hyperglycemia, A1C 7%, postop anticipate IV insulin.  Will need tight glycemic control for postop wound healing.   CAD: on medications, stable, monitored. OR today CABG  ?Hypothyroidism: Elevated TSH 9, Free thyroxine levels not available, pending, no hx of thyroid disease.   HTN: on antihypertensive medications, monitored, asymptomatic.  HLD: on high intensity statin      Discussed plan and management wit Dr Shaun Dunn MD  Cell: [phone]  Office: [phone]               Assessment  DMT2: 67y Male with DM T2 with hyperglycemia, A1C 7%, postop anticipate IV insulin.  Will need tight glycemic control for postop wound healing.   CAD: on medications, stable, monitored. OR today CABG  ?Hypothyroidism: Elevated TSH 9, Free thyroxine levels not available, pending, no hx of thyroid disease.   HTN: on antihypertensive medications, monitored, asymptomatic.  HLD: on high intensity statin      Discussed plan and management wit Dr Shaun Dunn MD  Cell: [phone]  Office: [phone]

## 2023-07-19 NOTE — CONSULT NOTE ADULT - PROBLEM SELECTOR RECOMMENDATION 9
Will continue current insulin regimen for now.   Postop IV insulin for glycemic control  Will need tight glycemic control for postop wound healing.   Will continue monitoring FS, log, and glucose trends, will Follow up.

## 2023-07-19 NOTE — BRIEF OPERATIVE NOTE - COMMENTS
No unexpected foreign bodies were apparent on preliminary review of post-op x-ray. Reviewed by Dr. Hall No unexpected foreign bodies were apparent on preliminary review of post-op x-ray. Reviewed by Dr. Hall    Radial and Ulnar Artery Assessed pre-op and intra operative.  The ulnar and radial artery assessed via the modified marisa test, SPO2 and ultrasound assessment which showed good Ulnar and Radial arterial flow.

## 2023-07-19 NOTE — AIRWAY REMOVAL NOTE  ADULT & PEDS - ARTIFICAL AIRWAY REMOVAL COMMENTS
Written order for extubation verified. The patient was identified by full name and birth date compared to the identification band. Present during the procedure was Jessa Wilkes (JOSEPH)

## 2023-07-19 NOTE — PROGRESS NOTE ADULT - ASSESSMENT
_________________________________________________________________________________________  ========>>  M E D I C A L   A T T E N D I N G    F O L L O W  U P  N O T E  <<=========  -----------------------------------------------------------------------------------------------------    - Patient seen and examined by me earlier today.  patient seen post op CABG, in CTICU  - In summary,  GILA SHAH is a 67y year old man admitted with CP / CAD   - Patient doing ok post outpatient CABG X5! comfortable, intubated / sedated / on pressors, insulin drip.. stable     ==================>> REVIEW OF SYSTEM <<=================    unable to obtain     ==================>> PHYSICAL EXAM <<=================    GEN: intubated, sedated, vented, resting, NAD , comfortable, bear hugger on   HEENT: NCAT, lines and tubes in place   Neck: supple , lines and tubes in place   CVS: S1S2 , regular   PULM: CTA B/L,  limited ... chest tubes in place   ABD.: soft. non tender, non distended  Extrem: intact pulses , no edema , + betty                                        ( Note written / Date of service 07-19-23 )    ==================>> MEDICATIONS <<====================    acetaminophen     Tablet .. 650 milliGRAM(s) Oral every 6 hours  ascorbic acid 500 milliGRAM(s) Oral two times a day  aspirin enteric coated 81 milliGRAM(s) Oral daily  aspirin Suppository 300 milliGRAM(s) Rectal once  cefuroxime  IVPB 1500 milliGRAM(s) IV Intermittent every 8 hours  chlorhexidine 0.12% Liquid 15 milliLiter(s) Oral Mucosa every 12 hours  chlorhexidine 2% Cloths 1 Application(s) Topical daily  dexMEDEtomidine Infusion 0.5 MICROgram(s)/kG/Hr IV Continuous <Continuous>  dextrose 50% Injectable 25 milliLiter(s) IV Push every 15 minutes  dextrose 50% Injectable 50 milliLiter(s) IV Push every 15 minutes  gabapentin 100 milliGRAM(s) Oral every 8 hours  insulin regular Infusion 3 Unit(s)/Hr IV Continuous <Continuous>  mupirocin 2% Ointment 1 Application(s) Both Nostrils two times a day  norepinephrine Infusion 0.02 MICROgram(s)/kG/Min IV Continuous <Continuous>  pantoprazole  Injectable 40 milliGRAM(s) IV Push daily  potassium chloride  10 mEq/50 mL IVPB 10 milliEquivalent(s) IV Intermittent every 1 hour  potassium chloride  10 mEq/50 mL IVPB 10 milliEquivalent(s) IV Intermittent every 1 hour  potassium chloride  10 mEq/50 mL IVPB 10 milliEquivalent(s) IV Intermittent every 1 hour  sodium chloride 0.9%. 1000 milliLiter(s) IV Continuous <Continuous>    MEDICATIONS  (PRN):  HYDROmorphone  Injectable 0.5 milliGRAM(s) IV Push every 6 hours PRN Breakthrough Pain  oxyCODONE    IR 10 milliGRAM(s) Oral every 4 hours PRN Severe Pain (7 - 10)  oxyCODONE    IR 5 milliGRAM(s) Oral every 4 hours PRN Moderate Pain (4 - 6)    ___________  Active diet:  Diet, Regular:   Consistent Carbohydrate No Snacks (CSTCHO)  Diet, Clear Liquid:   Consistent Carbohydrate No Snacks (CSTCHO)  Diet, NPO  ___________________    ==================>> VITAL SIGNS <<==================  Height (cm): 162.6  Weight (kg): 79  BMI (kg/m2): 29.9  Vital Signs Last 24 HrsT(C): 35.4 (07-19-23 @ 16:00)  T(F): 95.7 (07-19-23 @ 16:00), Max: 98.1 (07-18-23 @ 20:04)  HR: 92 (07-19-23 @ 16:00) (50 - 92)  BP: 134/74 (07-19-23 @ 04:17)  RR: 3 (07-19-23 @ 16:00) (3 - 18)  SpO2: 100% (07-19-23 @ 16:00) (95% - 100%)      CAPILLARY BLOOD GLUCOSE      POCT Blood Glucose.: 160 mg/dL (19 Jul 2023 16:07)  POCT Blood Glucose.: 150 mg/dL (18 Jul 2023 21:36)  POCT Blood Glucose.: 175 mg/dL (18 Jul 2023 16:36)     ==================>> LAB AND IMAGING <<==================                        10.1   7.31  )-----------( 155      ( 19 Jul 2023 15:20 )             30.4        07-19    147<H>  |  112<H>  |  17  ----------------------------<  174<H>  5.3   |  24  |  0.96    Ca    8.3<L>      19 Jul 2023 15:21    TPro  5.1<L>  /  Alb  3.1<L>  /  TBili  1.4<H>  /  DBili  x   /  AST  59<H>  /  ALT  31  /  AlkPhos  59  07-19    WBC count:   7.31 <<== ,  6.48 <<== ,  6.58 <<== ,  5.34 <<== ,  5.61 <<==   Hemoglobin:   10.1 <<==,  14.5 <<==,  14.6 <<==,  14.1 <<==,  14.2 <<==  platelets:  155 <==, 182 <==, 190 <==, 169 <==, 177 <==, 175 <==, 203 <==    Creatinine:  0.96  <<==, 1.07  <<==, 1.07  <<==, 1.02  <<==, 1.04  <<==, 1.02  <<==  Sodium:   147  <==, 137  <==, 135  <==, 138  <==, 137  <==, 139  <==, 140  <==       AST:          59 <== , 27 <== , 33 <==      ALT:        31  <== , 42  <== , 23  <==      AP:        59  <=, 94  <=, 84  <=     Bili:        1.4  <=, 0.9  <=, 0.4  <=              < from: TTE W or WO Ultrasound Enhancing Agent (07.18.23 @ 08:37) >  CONCLUSIONS:    1. Normal left ventricular cavity size. The left ventricular wall thickness is normal. The left ventricular systolic function is normal with an ejection fraction of 65 % by Black's method of disks. There are regional wall motion abnormalities No evidence of a thrombus in the left ventricle.   2. Mid inferolateral segment and mid anterolateral segment are abnormal.      Mid inferolateral segment and mid anterolateral segment are abnormal.   3. Normal atria.   4. Normal right ventricular cavity size, normal right ventricular wall thickness and normal right ventricular systolic function. The tricuspid annular plane systolic excursion (TAPSE) is 2.1 cm (normal >=1.7 cm).   5. No significant valvular disease.   6. No pericardial effusion seen.   7. No prior echocardiogram is available for comparison.  < end of copied text >    < from: Cardiac Catheterization (07.14.23 @ 09:51) >  Procedures:                 1.    Arterial Access - Right Radial   2.    Left Heart Cath   3.    Diagnostic Coronary Angiography   Primary ICD-10: I25.110 - Atherosclerotic heart disease of native  coronary artery with unstable angina pectoris   Secondary ICD-10   E11.59 - Type 2 diabetes mellitus with other circulatory complications  I10 - Essential (primary) hypertension     Diagnostic Conclusions:   Patient has severe triple vessel disease as described with progressive  symptoms of angin    Recommendations:   Revascularization with CABG. Transfer to [facility]   < end of copied text >    ___________________________________________________________________________________  ===============>>  A S S E S S M E N T   A N D   P L A N <<===============  ------------------------------------------------------------------------------------------    · Assessment	  67-year-old male with NIDDM2, HTN, HLD, asthma, CPAP on LUCRECIA, sent to the ED by Dr. Hart for work-up of EKG abnormalities.      INCOMPLETE          Problem/Plan - 1:  ·  Problem: Chest pain in patient with severe CAD.     transfered to [facility] for CABG   TTE and cath as above   monitor on tele  c/w ASA, statin.  med optimization per cardiologist  completing CABG evaluation workup ..      Problem/Plan - 2:  ·  Problem: type 2 diabetes mellitus.   hold oral meds while inpatient  FS Q6H w/ISS.  monitor  FS closely asia- op  endocrine evaluation as needed      Problem/Plan - 3:  ·  Problem: LUCRECIA on CPAP.   > would need CPAP ordered asia-op as well     --------------------------------------------  Case discussed with patient, RN..   Education given on findings and plan of care  ___________________________  HPablito Wilkinson D.O.  Pager: [phone]     _________________________________________________________________________________________  ========>>  M E D I C A L   A T T E N D I N G    F O L L O W  U P  N O T E  <<=========  -----------------------------------------------------------------------------------------------------    - Patient seen and examined by me earlier today.  patient seen post op CABG, in CTICU  - In summary,  GILA SHAH is a 67y year old man admitted with CP / CAD   - Patient doing ok post outpatient CABG X5! comfortable, intubated / sedated / on pressors, insulin drip.. stable     ==================>> REVIEW OF SYSTEM <<=================    unable to obtain     ==================>> PHYSICAL EXAM <<=================    GEN: intubated, sedated, vented, resting, NAD , comfortable, bear hugger on   HEENT: NCAT, lines and tubes in place   Neck: supple , lines and tubes in place   CVS: S1S2 , regular   PULM: CTA B/L,  limited ... chest tubes in place   ABD.: soft. non tender, non distended  Extrem: intact pulses , no edema , + betty                              ( Note written / Date of service 07-19-23 )    ==================>> MEDICATIONS <<====================    acetaminophen     Tablet .. 650 milliGRAM(s) Oral every 6 hours  ascorbic acid 500 milliGRAM(s) Oral two times a day  aspirin enteric coated 81 milliGRAM(s) Oral daily  aspirin Suppository 300 milliGRAM(s) Rectal once  cefuroxime  IVPB 1500 milliGRAM(s) IV Intermittent every 8 hours  chlorhexidine 0.12% Liquid 15 milliLiter(s) Oral Mucosa every 12 hours  chlorhexidine 2% Cloths 1 Application(s) Topical daily  dexMEDEtomidine Infusion 0.5 MICROgram(s)/kG/Hr IV Continuous <Continuous>  dextrose 50% Injectable 25 milliLiter(s) IV Push every 15 minutes  dextrose 50% Injectable 50 milliLiter(s) IV Push every 15 minutes  gabapentin 100 milliGRAM(s) Oral every 8 hours  insulin regular Infusion 3 Unit(s)/Hr IV Continuous <Continuous>  mupirocin 2% Ointment 1 Application(s) Both Nostrils two times a day  norepinephrine Infusion 0.02 MICROgram(s)/kG/Min IV Continuous <Continuous>  pantoprazole  Injectable 40 milliGRAM(s) IV Push daily  potassium chloride  10 mEq/50 mL IVPB 10 milliEquivalent(s) IV Intermittent every 1 hour  potassium chloride  10 mEq/50 mL IVPB 10 milliEquivalent(s) IV Intermittent every 1 hour  potassium chloride  10 mEq/50 mL IVPB 10 milliEquivalent(s) IV Intermittent every 1 hour  sodium chloride 0.9%. 1000 milliLiter(s) IV Continuous <Continuous>    MEDICATIONS  (PRN):  HYDROmorphone  Injectable 0.5 milliGRAM(s) IV Push every 6 hours PRN Breakthrough Pain  oxyCODONE    IR 10 milliGRAM(s) Oral every 4 hours PRN Severe Pain (7 - 10)  oxyCODONE    IR 5 milliGRAM(s) Oral every 4 hours PRN Moderate Pain (4 - 6)    ___________  Active diet:  Diet, Regular:   Consistent Carbohydrate No Snacks (CSTCHO)  Diet, Clear Liquid:   Consistent Carbohydrate No Snacks (CSTCHO)  Diet, NPO  ___________________    ==================>> VITAL SIGNS <<==================  Height (cm): 162.6  Weight (kg): 79  BMI (kg/m2): 29.9  Vital Signs Last 24 HrsT(C): 35.4 (07-19-23 @ 16:00)  T(F): 95.7 (07-19-23 @ 16:00), Max: 98.1 (07-18-23 @ 20:04)  HR: 92 (07-19-23 @ 16:00) (50 - 92)  BP: 134/74 (07-19-23 @ 04:17)  RR: 3 (07-19-23 @ 16:00) (3 - 18)  SpO2: 100% (07-19-23 @ 16:00) (95% - 100%)      CAPILLARY BLOOD GLUCOSE    POCT Blood Glucose.: 160 mg/dL (19 Jul 2023 16:07)  POCT Blood Glucose.: 150 mg/dL (18 Jul 2023 21:36)  POCT Blood Glucose.: 175 mg/dL (18 Jul 2023 16:36)     ==================>> LAB AND IMAGING <<==================                        10.1   7.31  )-----------( 155      ( 19 Jul 2023 15:20 )             30.4        07-19    147<H>  |  112<H>  |  17  ----------------------------<  174<H>  5.3   |  24  |  0.96    Ca    8.3<L>      19 Jul 2023 15:21    TPro  5.1<L>  /  Alb  3.1<L>  /  TBili  1.4<H>  /  DBili  x   /  AST  59<H>  /  ALT  31  /  AlkPhos  59  07-19    WBC count:   7.31 <<== ,  6.48 <<== ,  6.58 <<== ,  5.34 <<== ,  5.61 <<==   Hemoglobin:   10.1 <<==,  14.5 <<==,  14.6 <<==,  14.1 <<==,  14.2 <<==  platelets:  155 <==, 182 <==, 190 <==, 169 <==, 177 <==, 175 <==, 203 <==    Creatinine:  0.96  <<==, 1.07  <<==, 1.07  <<==, 1.02  <<==, 1.04  <<==, 1.02  <<==  Sodium:   147  <==, 137  <==, 135  <==, 138  <==, 137  <==, 139  <==, 140  <==       AST:          59 <== , 27 <== , 33 <==      ALT:        31  <== , 42  <== , 23  <==      AP:        59  <=, 94  <=, 84  <=     Bili:        1.4  <=, 0.9  <=, 0.4  <=      < from: VA Duplex Carotid, Bilat (07.18.23 @ 10:17) >  IMPRESSION: No significant hemodynamic stenosis of either carotid artery.  < end of copied text >    < from: TTE W or WO Ultrasound Enhancing Agent (07.18.23 @ 08:37) >  CONCLUSIONS:    1. Normal left ventricular cavity size. The left ventricular wall thickness is normal. The left ventricular systolic function is normal with an ejection fraction of 65 % by Black's method of disks. There are regional wall motion abnormalities No evidence of a thrombus in the left ventricle.   2. Mid inferolateral segment and mid anterolateral segment are abnormal.      Mid inferolateral segment and mid anterolateral segment are abnormal.   3. Normal atria.   4. Normal right ventricular cavity size, normal right ventricular wall thickness and normal right ventricular systolic function. The tricuspid annular plane systolic excursion (TAPSE) is 2.1 cm (normal >=1.7 cm).   5. No significant valvular disease.   6. No pericardial effusion seen.   7. No prior echocardiogram is available for comparison.  < end of copied text >    < from: Cardiac Catheterization (07.14.23 @ 09:51) >  Procedures:                 1.    Arterial Access - Right Radial   2.    Left Heart Cath   3.    Diagnostic Coronary Angiography   Primary ICD-10: I25.110 - Atherosclerotic heart disease of native  coronary artery with unstable angina pectoris   Secondary ICD-10   E11.59 - Type 2 diabetes mellitus with other circulatory complications  I10 - Essential (primary) hypertension     Diagnostic Conclusions:   Patient has severe triple vessel disease as described with progressive  symptoms of angin    Recommendations:   Revascularization with CABG. Transfer to [facility]   < end of copied text >    ___________________________________________________________________________________  ===============>>  A S S E S S M E N T   A N D   P L A N <<===============  ------------------------------------------------------------------------------------------    · Assessment	  67-year-old male with NIDDM2, HTN, HLD, asthma, CPAP on LUCRECIA, sent to the ED by Dr. Hart for work-up of EKG abnormalities.     Problem/Plan - 1:  ·  Problem: Chest pain / Angina in patient with severe CAD.    Patient status post CABG, in CTICU   Appreciated CT surgery, critical care, CT ICU  monitor on tele  Wean off pressers, vent, sedation, and Insulin drip as able  med optimization per cardiologist  Management of chest tubes as per surgery team  pain management as needed  supportive care      Problem/Plan - 2:  ·  Problem: type 2 diabetes mellitus.   hold oral meds while inpatient  FS Q6H w/ISS.  monitor  FS closely asia- op  endocrine appreciated      Problem/Plan - 3:  ·  Problem: LUCRECIA on CPAP.   > would need CPAP Nightly Post extubaiton     --------------------------------------------  Case discussed with RN..   ___________________________  Will follow with you  thank you  NOREEN Wilkinson D.O.  Pager: [phone]     _________________________________________________________________________________________  ========>>  M E D I C A L   A T T E N D I N G    F O L L O W  U P  N O T E  <<=========  -----------------------------------------------------------------------------------------------------    - Patient seen and examined by me earlier today.  patient seen post op CABG, in CTICU  - In summary,  GILA SHAH is a 67y year old man admitted with CP / CAD   - Patient doing ok post op CABG X5! comfortable, intubated / sedated / on pressors, insulin drip.. stable     ==================>> REVIEW OF SYSTEM <<=================    unable to obtain     ==================>> PHYSICAL EXAM <<=================    GEN: intubated, sedated, vented, resting, NAD , comfortable, bear hugger on   HEENT: NCAT, lines and tubes in place   Neck: supple , lines and tubes in place   CVS: S1S2 , regular   PULM: CTA B/L,  limited ... chest tubes in place   ABD.: soft. non tender, non distended  Extrem: intact pulses , no edema , + betty                              ( Note written / Date of service 07-19-23 )    ==================>> MEDICATIONS <<====================    acetaminophen     Tablet .. 650 milliGRAM(s) Oral every 6 hours  ascorbic acid 500 milliGRAM(s) Oral two times a day  aspirin enteric coated 81 milliGRAM(s) Oral daily  aspirin Suppository 300 milliGRAM(s) Rectal once  cefuroxime  IVPB 1500 milliGRAM(s) IV Intermittent every 8 hours  chlorhexidine 0.12% Liquid 15 milliLiter(s) Oral Mucosa every 12 hours  chlorhexidine 2% Cloths 1 Application(s) Topical daily  dexMEDEtomidine Infusion 0.5 MICROgram(s)/kG/Hr IV Continuous <Continuous>  dextrose 50% Injectable 25 milliLiter(s) IV Push every 15 minutes  dextrose 50% Injectable 50 milliLiter(s) IV Push every 15 minutes  gabapentin 100 milliGRAM(s) Oral every 8 hours  insulin regular Infusion 3 Unit(s)/Hr IV Continuous <Continuous>  mupirocin 2% Ointment 1 Application(s) Both Nostrils two times a day  norepinephrine Infusion 0.02 MICROgram(s)/kG/Min IV Continuous <Continuous>  pantoprazole  Injectable 40 milliGRAM(s) IV Push daily  potassium chloride  10 mEq/50 mL IVPB 10 milliEquivalent(s) IV Intermittent every 1 hour  potassium chloride  10 mEq/50 mL IVPB 10 milliEquivalent(s) IV Intermittent every 1 hour  potassium chloride  10 mEq/50 mL IVPB 10 milliEquivalent(s) IV Intermittent every 1 hour  sodium chloride 0.9%. 1000 milliLiter(s) IV Continuous <Continuous>    MEDICATIONS  (PRN):  HYDROmorphone  Injectable 0.5 milliGRAM(s) IV Push every 6 hours PRN Breakthrough Pain  oxyCODONE    IR 10 milliGRAM(s) Oral every 4 hours PRN Severe Pain (7 - 10)  oxyCODONE    IR 5 milliGRAM(s) Oral every 4 hours PRN Moderate Pain (4 - 6)    ___________  Active diet:  Diet, Regular:   Consistent Carbohydrate No Snacks (CSTCHO)  Diet, Clear Liquid:   Consistent Carbohydrate No Snacks (CSTCHO)  Diet, NPO  ___________________    ==================>> VITAL SIGNS <<==================  Height (cm): 162.6  Weight (kg): 79  BMI (kg/m2): 29.9  Vital Signs Last 24 HrsT(C): 35.4 (07-19-23 @ 16:00)  T(F): 95.7 (07-19-23 @ 16:00), Max: 98.1 (07-18-23 @ 20:04)  HR: 92 (07-19-23 @ 16:00) (50 - 92)  BP: 134/74 (07-19-23 @ 04:17)  RR: 3 (07-19-23 @ 16:00) (3 - 18)  SpO2: 100% (07-19-23 @ 16:00) (95% - 100%)      CAPILLARY BLOOD GLUCOSE    POCT Blood Glucose.: 160 mg/dL (19 Jul 2023 16:07)  POCT Blood Glucose.: 150 mg/dL (18 Jul 2023 21:36)  POCT Blood Glucose.: 175 mg/dL (18 Jul 2023 16:36)     ==================>> LAB AND IMAGING <<==================                        10.1   7.31  )-----------( 155      ( 19 Jul 2023 15:20 )             30.4        07-19    147<H>  |  112<H>  |  17  ----------------------------<  174<H>  5.3   |  24  |  0.96    Ca    8.3<L>      19 Jul 2023 15:21    TPro  5.1<L>  /  Alb  3.1<L>  /  TBili  1.4<H>  /  DBili  x   /  AST  59<H>  /  ALT  31  /  AlkPhos  59  07-19    WBC count:   7.31 <<== ,  6.48 <<== ,  6.58 <<== ,  5.34 <<== ,  5.61 <<==   Hemoglobin:   10.1 <<==,  14.5 <<==,  14.6 <<==,  14.1 <<==,  14.2 <<==  platelets:  155 <==, 182 <==, 190 <==, 169 <==, 177 <==, 175 <==, 203 <==    Creatinine:  0.96  <<==, 1.07  <<==, 1.07  <<==, 1.02  <<==, 1.04  <<==, 1.02  <<==  Sodium:   147  <==, 137  <==, 135  <==, 138  <==, 137  <==, 139  <==, 140  <==       AST:          59 <== , 27 <== , 33 <==      ALT:        31  <== , 42  <== , 23  <==      AP:        59  <=, 94  <=, 84  <=     Bili:        1.4  <=, 0.9  <=, 0.4  <=      < from: VA Duplex Carotid, Bilat (07.18.23 @ 10:17) >  IMPRESSION: No significant hemodynamic stenosis of either carotid artery.  < end of copied text >    < from: TTE W or WO Ultrasound Enhancing Agent (07.18.23 @ 08:37) >  CONCLUSIONS:    1. Normal left ventricular cavity size. The left ventricular wall thickness is normal. The left ventricular systolic function is normal with an ejection fraction of 65 % by Black's method of disks. There are regional wall motion abnormalities No evidence of a thrombus in the left ventricle.   2. Mid inferolateral segment and mid anterolateral segment are abnormal.      Mid inferolateral segment and mid anterolateral segment are abnormal.   3. Normal atria.   4. Normal right ventricular cavity size, normal right ventricular wall thickness and normal right ventricular systolic function. The tricuspid annular plane systolic excursion (TAPSE) is 2.1 cm (normal >=1.7 cm).   5. No significant valvular disease.   6. No pericardial effusion seen.   7. No prior echocardiogram is available for comparison.  < end of copied text >    < from: Cardiac Catheterization (07.14.23 @ 09:51) >  Procedures:                 1.    Arterial Access - Right Radial   2.    Left Heart Cath   3.    Diagnostic Coronary Angiography   Primary ICD-10: I25.110 - Atherosclerotic heart disease of native  coronary artery with unstable angina pectoris   Secondary ICD-10   E11.59 - Type 2 diabetes mellitus with other circulatory complications  I10 - Essential (primary) hypertension     Diagnostic Conclusions:   Patient has severe triple vessel disease as described with progressive  symptoms of angin    Recommendations:   Revascularization with CABG. Transfer to [facility]   < end of copied text >    ___________________________________________________________________________________  ===============>>  A S S E S S M E N T   A N D   P L A N <<===============  ------------------------------------------------------------------------------------------    · Assessment	  67-year-old male with NIDDM2, HTN, HLD, asthma, CPAP on LUCRECIA, sent to the ED by Dr. Hart for work-up of EKG abnormalities.     Problem/Plan - 1:  ·  Problem: Chest pain / Angina in patient with severe CAD.    Patient status post CABG, in CTICU   Appreciated CT surgery, critical care, CT ICU  monitor on tele  Wean off pressers, vent, sedation, and Insulin drip as able  med optimization per cardiologist  Management of chest tubes as per surgery team  pain management as needed  supportive care      Problem/Plan - 2:  ·  Problem: type 2 diabetes mellitus.   hold oral meds while inpatient  FS Q6H w/ISS.  monitor  FS closely asia- op  endocrine appreciated      Problem/Plan - 3:  ·  Problem: LUCRECIA on CPAP.   > would need CPAP Nightly Post extubaiton     --------------------------------------------  Case discussed with RN..   ___________________________  Will follow with you  thank you  NOREEN Wilkinson D.O.  Pager: [phone]

## 2023-07-19 NOTE — BRIEF OPERATIVE NOTE - NSICDXBRIEFPROCEDURE_GEN_ALL_CORE_FT
PROCEDURES:  CABG, 2 arterial and 3 venous 19-Jul-2023 15:19:15 LIMA; Left radial artery Emperatriz Roblero

## 2023-07-19 NOTE — CONSULT NOTE ADULT - SUBJECTIVE AND OBJECTIVE BOX
HPI:  67-year-old male with NIDDM2, HTN, HLD, asthma, CPAP on LUCRECIA, sent to the ED by Dr. Hart for work-up of EKG abnormalities. pain is described as a pressure over left and right anterior chest wall, radiating to R shoulder, 10/10 in severity, lasting 15-30 minutes at a time, worse with exertion, associated with diaphoresis and without any associated nausea, shortness of breath or palpitations. He denies any history of CAD, prior cath/echo or stress testing. He reports prior w/u at [facility] however unsure of results or testing performed  Cardiac cath done on 7/14/23 showed - [facility]: pLAD 90%, D1 95%, mLCx 90%, mRCA 90%, LVEDP 15, Transferred to [facility] for CABg eval w/ Dr Hall   (17 Jul 2023 19:34)      Patient has history of diabetes, A1C  7.0 %   Endo was consulted for glycemic control.      PAST MEDICAL & SURGICAL HISTORY:  Hypertension      Hyperlipemia      Diabetes mellitus      Asthma      LUCRECIA on CPAP      Triple vessel disease of the heart      History of appendectomy          FAMILY HISTORY:  No pertinent family history in first degree relatives        Social History:  lives with wife  neg alcohol  neg tobacco  retired  (17 Jul 2023 19:34)            HOME MEDICATIONS:  Home Medications:  amLODIPine 10 mg oral tablet: 1 tab(s) orally once a day (17 Jul 2023 16:17)  aspirin 81 mg oral delayed release tablet: 1 tab(s) orally once a day (14 Jul 2023 12:25)  ATORVASTATIN 40MG TAB: 1 tab(s) orally once a day (14 Jul 2023 12:25)  metoprolol: 12.5 milligram(s) orally 2 times a day (17 Jul 2023 16:17)  pantoprazole 40 mg oral delayed release tablet: 1 tab(s) orally once a day (before a meal) (17 Jul 2023 16:17)  TAMSULOSIN CAPS 0.4 MG     ACT: TAKE 1 CAPSULE BY MOUTH DAILY AT BEDTIME (14 Jul 2023 12:25)            MEDICATIONS  (STANDING):    MEDICATIONS  (PRN):      Allergies    No Known Allergies    Intolerances        Review of Systems:  Neuro: No HA, no dizziness  Cardiovascular: No chest pain, no palpitations  Respiratory: no SOB, no cough  GI: No nausea, vomiting, abdominal pain  MSK: Denies joint/muscle pain      ALL OTHER SYSTEMS REVIEWED AND NEGATIVE      PHYSICAL EXAM:  VITALS: T(C): 36.4 (07-19-23 @ 04:17)  T(F): 97.5 (07-19-23 @ 04:17), Max: 98.1 (07-18-23 @ 20:04)  HR: 59 (07-19-23 @ 04:17) (50 - 59)  BP: 134/74 (07-19-23 @ 04:17) (129/77 - 156/76)  RR:  (18 - 18)  SpO2:  (95% - 99%)  Wt(kg): --  GENERAL: NAD, well-groomed, well-developed  NEURO:  alert and oriented  RESPIRATORY: Clear to auscultation bilaterally; No rales, rhonchi, wheezing  CARDIOVASCULAR: Si S2  GI: Soft, non distended, normal bowel sounds  MUSCULOSKELETAL: Moves all extremities equally       POCT Blood Glucose.: 150 mg/dL (07-18-23 @ 21:36)  POCT Blood Glucose.: 175 mg/dL (07-18-23 @ 16:36)  POCT Blood Glucose.: 260 mg/dL (07-18-23 @ 11:26)  POCT Blood Glucose.: 166 mg/dL (07-18-23 @ 07:31)  POCT Blood Glucose.: 218 mg/dL (07-17-23 @ 21:34)  POCT Blood Glucose.: 122 mg/dL (07-17-23 @ 17:25)  POCT Blood Glucose.: 189 mg/dL (07-17-23 @ 12:12)  POCT Blood Glucose.: 157 mg/dL (07-17-23 @ 08:27)  POCT Blood Glucose.: 169 mg/dL (07-16-23 @ 21:53)  POCT Blood Glucose.: 109 mg/dL (07-16-23 @ 17:00)  POCT Blood Glucose.: 169 mg/dL (07-16-23 @ 12:38)                            14.5   6.48  )-----------( 182      ( 18 Jul 2023 06:18 )             45.8       07-18    137  |  101  |  24<H>  ----------------------------<  144<H>  4.1   |  19<L>  |  1.07    eGFR: 77    Ca    9.9      07-18  Mg     2.20     07-17  Phos  3.9     07-17    TPro  7.8  /  Alb  4.3  /  TBili  0.9  /  DBili  0.2  /  AST  27  /  ALT  42  /  AlkPhos  94  07-18      Thyroid Function Tests:  07-18 @ 06:18 TSH 9.02 FreeT4 -- T3 163 Anti TPO -- Anti Thyroglobulin Ab -- TSI --  07-14 @ 05:44 TSH 5.39 FreeT4 -- T3 -- Anti TPO -- Anti Thyroglobulin Ab -- TSI --    Diet, Regular:   Consistent Carbohydrate No Snacks (CSTCHO) (07-19-23 @ 06:33) [Hold]  Diet, Clear Liquid:   Consistent Carbohydrate No Snacks (CSTCHO) (07-19-23 @ 06:33) [Hold]  Diet, NPO (07-19-23 @ 06:33) [Hold]        07-14 Chol 131 Direct LDL -- LDL calculated 57 HDL 57 Trig 87  A1C with Estimated Average Glucose Result: 7.0 % (07-15-23 @ 05:50)                     HPI:  67-year-old male with NIDDM2, HTN, HLD, asthma, CPAP on LUCRECIA, sent to the ED by Dr. Hart for work-up of EKG abnormalities. pain is described as a pressure  over left and right anterior chest wall, radiating to R shoulder, 10/10 in severity, lasting 15-30 minutes at a time, worse with exertion, associated with diaphoresis and without any associated nausea, shortness of breath or palpitations. He denies any history of CAD, prior cath/echo or stress testing. He reports prior w/u at [facility] however unsure of results or testing performed  Cardiac cath done on 7/14/23 showed - [facility]: pLAD 90%, D1 95%, mLCx 90%, mRCA 90%, LVEDP 15, Transferred to [facility] for CABg eval w/ Dr Hall   (17 Jul 2023 19:34)      Patient has history of diabetes, A1C  7.0 %   Endo was consulted for glycemic control.      PAST MEDICAL & SURGICAL HISTORY:  Hypertension      Hyperlipemia      Diabetes mellitus      Asthma      LUCRECIA on CPAP      Triple vessel disease of the heart      History of appendectomy          FAMILY HISTORY:  No pertinent family history in first degree relatives        Social History:  lives with wife  neg alcohol  neg tobacco  retired  (17 Jul 2023 19:34)            HOME MEDICATIONS:  Home Medications:  amLODIPine 10 mg oral tablet: 1 tab(s) orally once a day (17 Jul 2023 16:17)  aspirin 81 mg oral delayed release tablet: 1 tab(s) orally once a day (14 Jul 2023 12:25)  ATORVASTATIN 40MG TAB: 1 tab(s) orally once a day (14 Jul 2023 12:25)  metoprolol: 12.5 milligram(s) orally 2 times a day (17 Jul 2023 16:17)  pantoprazole 40 mg oral delayed release tablet: 1 tab(s) orally once a day (before a meal) (17 Jul 2023 16:17)  TAMSULOSIN CAPS 0.4 MG     ACT: TAKE 1 CAPSULE BY MOUTH DAILY AT BEDTIME (14 Jul 2023 12:25)            MEDICATIONS  (STANDING):    MEDICATIONS  (PRN):      Allergies    No Known Allergies    Intolerances        Review of Systems:  Neuro: No HA, no dizziness  Cardiovascular: No chest pain, no palpitations  Respiratory: no SOB, no cough  GI: No nausea, vomiting, abdominal pain  MSK: Denies joint/muscle pain      ALL OTHER SYSTEMS REVIEWED AND NEGATIVE      PHYSICAL EXAM:  VITALS: T(C): 36.4 (07-19-23 @ 04:17)  T(F): 97.5 (07-19-23 @ 04:17), Max: 98.1 (07-18-23 @ 20:04)  HR: 59 (07-19-23 @ 04:17) (50 - 59)  BP: 134/74 (07-19-23 @ 04:17) (129/77 - 156/76)  RR:  (18 - 18)  SpO2:  (95% - 99%)  Wt(kg): --  GENERAL: NAD, well-groomed, well-developed  NEURO:  alert and oriented  RESPIRATORY: Clear to auscultation bilaterally; No rales, rhonchi, wheezing  CARDIOVASCULAR: Si S2  GI: Soft, non distended, normal bowel sounds  MUSCULOSKELETAL: Moves all extremities equally       POCT Blood Glucose.: 150 mg/dL (07-18-23 @ 21:36)  POCT Blood Glucose.: 175 mg/dL (07-18-23 @ 16:36)  POCT Blood Glucose.: 260 mg/dL (07-18-23 @ 11:26)  POCT Blood Glucose.: 166 mg/dL (07-18-23 @ 07:31)  POCT Blood Glucose.: 218 mg/dL (07-17-23 @ 21:34)  POCT Blood Glucose.: 122 mg/dL (07-17-23 @ 17:25)  POCT Blood Glucose.: 189 mg/dL (07-17-23 @ 12:12)  POCT Blood Glucose.: 157 mg/dL (07-17-23 @ 08:27)  POCT Blood Glucose.: 169 mg/dL (07-16-23 @ 21:53)  POCT Blood Glucose.: 109 mg/dL (07-16-23 @ 17:00)  POCT Blood Glucose.: 169 mg/dL (07-16-23 @ 12:38)                            14.5   6.48  )-----------( 182      ( 18 Jul 2023 06:18 )             45.8       07-18    137  |  101  |  24<H>  ----------------------------<  144<H>  4.1   |  19<L>  |  1.07    eGFR: 77    Ca    9.9      07-18  Mg     2.20     07-17  Phos  3.9     07-17    TPro  7.8  /  Alb  4.3  /  TBili  0.9  /  DBili  0.2  /  AST  27  /  ALT  42  /  AlkPhos  94  07-18      Thyroid Function Tests:  07-18 @ 06:18 TSH 9.02 FreeT4 -- T3 163 Anti TPO -- Anti Thyroglobulin Ab -- TSI --  07-14 @ 05:44 TSH 5.39 FreeT4 -- T3 -- Anti TPO -- Anti Thyroglobulin Ab -- TSI --    Diet, Regular:   Consistent Carbohydrate No Snacks (CSTCHO) (07-19-23 @ 06:33) [Hold]  Diet, Clear Liquid:   Consistent Carbohydrate No Snacks (CSTCHO) (07-19-23 @ 06:33) [Hold]  Diet, NPO (07-19-23 @ 06:33) [Hold]        07-14 Chol 131 Direct LDL -- LDL calculated 57 HDL 57 Trig 87  A1C with Estimated Average Glucose Result: 7.0 % (07-15-23 @ 05:50)

## 2023-07-19 NOTE — PROGRESS NOTE ADULT - SUBJECTIVE AND OBJECTIVE BOX
HPI:  67-year-old male with NIDDM2, HTN, HLD, asthma, CPAP on LUCRECIA, sent to the ED by Dr. Hart for work-up of EKG abnormalities. pain is described as a pressure over left and right anterior chest wall, radiating to R shoulder, 10/10 in severity, lasting 15-30 minutes at a time, worse with exertion, associated with diaphoresis and without any associated nausea, shortness of breath or palpitations. He denies any history of CAD, prior cath/echo or stress testing. He reports prior w/u at [facility] however unsure of results or testing performed  Cardiac cath done on 7/14/23 showed - [facility]: pLAD 90%, D1 95%, mLCx 90%, mRCA 90%, LVEDP 15, Transferred to [facility] for CABg eval w/ Dr Hall   (17 Jul 2023 19:34)      Patient seen and examined at the bedside.    Remained critically ill on continuous ICU monitoring.    OBJECTIVE:  Vital Signs Last 24 Hrs  T(C): 35.4 (19 Jul 2023 16:00), Max: 36.7 (18 Jul 2023 20:04)  T(F): 95.7 (19 Jul 2023 16:00), Max: 98.1 (18 Jul 2023 20:04)  HR: 92 (19 Jul 2023 16:00) (50 - 92)  BP: 134/74 (19 Jul 2023 04:17) (129/77 - 156/76)  BP(mean): --  RR: 3 (19 Jul 2023 16:00) (3 - 18)  SpO2: 100% (19 Jul 2023 16:00) (95% - 100%)    Parameters below as of 19 Jul 2023 04:17  Patient On (Oxygen Delivery Method): room air        Physical Exam:   General: Intubated  Neurology: sedated  Eyes: bilateral pupils equal and reactive   ENT/Neck: Neck supple, trachea midline, No JVD   Respiratory: Clear bilaterally   CV: S1S2, no murmurs        [x] Sternal dressing, [x] Mediastinal CT x2, [x] R, L Pleural CT        [x] Sinus bradycardia  Abdominal: Soft, NT, ND +BS  Extremities: 1-2+ pedal edema noted, + peripheral pulses   Skin: No Rashes, Hematoma, Ecchymosis                             Assessment:  68 y/o M w PMHx of NIDDM2, HTN, HLD, asthma, CPAP on LUCRECIA, sent to the ED by Dr. Hart for work-up of EKG abnormalities, CAD s/p CABG 07/19/23    Hemodynamic instability  Hypovolemia  Post op respiratory insufficiency  Acute blood loss anemia  Diabetes Mellitus        Plan:   ***Neuro***  Addressed analgesic regimen with prn Dilaudid, Oxycodone, Tylenol, and Gabapentin to optimize function and sedated with Precedex for ventilatory synchrony.           ***Cardiovascular***  CAD s/p CABG 07/19/23. IV levophed infusion for pressor support. Invasive hemodynamic monitoring with a central venous catheter & an A-line were required for the continuous central venous and MAP/BP monitoring to ensure adequate cardiovascular support. ASA continued for graft occlusion-thromboembolism prophylaxis.        ***Pulmonary***  Patient initially on full ventilator support, subsequently weaned to pressure support and extubated while closely monitoring respiratory rate, breathing pattern, pulse ox monitoring, and intermittent blood gas analysis. Comfortable on room air. Continue close monitoring of breathing pattern, respiratory rate, the following of continuous pulse oximetry for support and intermittent blood gas analysis to prevent decompensation.        Mode: AC/ CMV (Assist Control/ Continuous Mandatory Ventilation)  RR (machine): 12  TV (machine): 450  FiO2: 100  PEEP: 5  ITime: 1  MAP: 11  PIP: 21               ***Heme***  Patient received one unit of packed red blood cells, two units of platelets and ten units of cryoprecipitate earlier today. Monitor hemoglobin and hematocrit levels.         ***GI***  Patient is currently NPO. Continue Protonix for stress ulcer prophylaxis.     ***Renal***  Optimize renal perfusion with adequate volume resuscitation and continued monitoring of urine output, fluid balance, electrolytes, and BUN/Creatinine.          ***ID***  Afebrile, white blood count within normal limits. Continue trending white blood count and monitoring fever curve. Continue on Cefuroxime for perioperative coverage.         ***Endocrine***  Metabolic stability, history of diabetes mellitus required an IV regular Insulin drip while following serial glucose levels to help achieve and maintain euglycemia.                Patient requires continuous monitoring with bedside rhythm monitoring, pulse oximetry monitoring, and continuous central venous and arterial pressure monitoring; and intermittent blood gas analysis. Care plan discussed with the ICU care team.   Patient remained critical, at risk for life threatening decompensation.    I have spent 30 minutes providing critical care management to this patient.    By signing my name below, I, Raymond Trevizo, attest that this documentation has been prepared under the direction and in the presence of Gail Landis MD   Electronically signed: Val Toribio, 07-19-23 @ 16:59    I, Gail Landis, personally performed the services described in this documentation. all medical record entries made by the scribe were at my direction and in my presence. I have reviewed the chart and agree that the record reflects my personal performance and is accurate and complete  Electronically signed: Gail Landis MD  HPI:  67-year-old male with NIDDM2, HTN, HLD, asthma, CPAP on LUCRECIA, sent to the ED by Dr. Hart for work-up of EKG abnormalities. pain is described as a pressure over left and right anterior chest wall, radiating to R shoulder, 10/10 in severity, lasting 15-30 minutes at a time, worse with exertion, associated with diaphoresis and without any associated nausea, shortness of breath or palpitations. He denies any history of CAD, prior cath/echo or stress testing. He reports prior w/u at [facility] however unsure of results or testing performed  Cardiac cath done on 7/14/23 showed - [facility]: pLAD 90%, D1 95%, mLCx 90%, mRCA 90%, LVEDP 15, Transferred to [facility] for CABg eval w/ Dr Hall   (17 Jul 2023 19:34)      Patient seen and examined at the bedside.    Remained critically ill on continuous ICU monitoring.    OBJECTIVE:  Vital Signs Last 24 Hrs  T(C): 35.4 (19 Jul 2023 16:00), Max: 36.7 (18 Jul 2023 20:04)  T(F): 95.7 (19 Jul 2023 16:00), Max: 98.1 (18 Jul 2023 20:04)  HR: 92 (19 Jul 2023 16:00) (50 - 92)  BP: 134/74 (19 Jul 2023 04:17) (129/77 - 156/76)  BP(mean): --  RR: 3 (19 Jul 2023 16:00) (3 - 18)  SpO2: 100% (19 Jul 2023 16:00) (95% - 100%)    Parameters below as of 19 Jul 2023 04:17  Patient On (Oxygen Delivery Method): room air        Physical Exam:   General: NAD  Neurology: sedated  Eyes: bilateral pupils equal and reactive   ENT/Neck: Neck supple, trachea midline, No JVD   Respiratory: Clear bilaterally   CV: S1S2, no murmurs        [x] Sternal dressing, [x] Mediastinal CT x2, [x] R, L Pleural CT        [x] Sinus bradycardia  Abdominal: Soft, NT, ND +BS  Extremities: 1-2+ pedal edema noted, + peripheral pulses   Skin: No Rashes, Hematoma, Ecchymosis                             Assessment:  66 y/o M w PMHx of NIDDM2, HTN, HLD, asthma, CPAP on LUCRECIA, sent to the ED by Dr. Hart for work-up of EKG abnormalities, now CAD s/p CABG 07/19/23    Hemodynamic instability  Hypovolemia  Post op respiratory insufficiency  Acute blood loss anemia  Diabetes Mellitus        Plan:   ***Neuro***  Addressed analgesic regimen with prn Dilaudid, Oxycodone, Tylenol, and Gabapentin to optimize function. Continue close monitoring of neurological status.      ***Cardiovascular***  CAD s/p CABG 07/19/23. IV levophed infusion for cardiogenic shock. Invasive hemodynamic monitoring with a central venous catheter & an A-line were required for the continuous central venous and MAP/BP monitoring to ensure adequate cardiovascular support. ASA continued for graft occlusion-thromboembolism prophylaxis.      ***Pulmonary***  Patient initially on full ventilator support, subsequently weaned to pressure support and extubated while closely monitoring respiratory rate, breathing pattern, pulse ox monitoring, and intermittent blood gas analysis. Comfortable on room air. Continue close monitoring of breathing pattern, respiratory rate, the following of continuous pulse oximetry for support and intermittent blood gas analysis to prevent decompensation.      Mode: AC/ CMV (Assist Control/ Continuous Mandatory Ventilation)  RR (machine): 12  TV (machine): 450  FiO2: 100  PEEP: 5  ITime: 1  MAP: 11  PIP: 21               ***Heme***  Patient received one unit of packed red blood cells, two units of platelets and ten units of cryoprecipitate earlier today. Monitor hemoglobin and hematocrit levels.       ***GI***  Patient is currently NPO. Continue Protonix for stress ulcer prophylaxis.     ***Renal***  Optimize renal perfusion with adequate volume resuscitation and continued monitoring of urine output, fluid balance, electrolytes, and BUN/Creatinine.        ***ID***  Afebrile, white blood count within normal limits. Continue trending white blood count and monitoring fever curve. Continue on Cefuroxime for perioperative coverage.       ***Endocrine***  Metabolic stability, history of diabetes mellitus required an IV regular Insulin drip while following serial glucose levels to help achieve and maintain euglycemia.            Patient requires continuous monitoring with bedside rhythm monitoring, pulse oximetry monitoring, and continuous central venous and arterial pressure monitoring; and intermittent blood gas analysis. Care plan discussed with the ICU care team.   Patient remained critical, at risk for life threatening decompensation.    I have spent 30 minutes providing critical care management to this patient.    By signing my name below, I, Raymond Trevizo, attest that this documentation has been prepared under the direction and in the presence of Gail Landis MD   Electronically signed: Val Toribio, 07-19-23 @ 16:59    I, Gail Landis, personally performed the services described in this documentation. all medical record entries made by the scribe were at my direction and in my presence. I have reviewed the chart and agree that the record reflects my personal performance and is accurate and complete  Electronically signed: Gail Landis MD  HPI:  67-year-old male with NIDDM2, HTN, HLD, asthma, CPAP on LUCRECIA, sent to the ED by Dr. Hart for work-up of EKG abnormalities. pain is described as a pressure over left and right anterior chest wall, radiating to R shoulder, 10/10 in severity, lasting 15-30 minutes at a time, worse with exertion, associated with diaphoresis and without any associated nausea, shortness of breath or palpitations. He denies any history of CAD, prior cath/echo or stress testing. He reports prior w/u at [facility] however unsure of results or testing performed  Cardiac cath done on 7/14/23 showed - [facility]: pLAD 90%, D1 95%, mLCx 90%, mRCA 90%, LVEDP 15, Transferred to [facility] for CABg eval w/ Dr Hall   (17 Jul 2023 19:34)      Patient seen and examined at the bedside.    Remained critically ill on continuous ICU monitoring.    OBJECTIVE:  Vital Signs Last 24 Hrs  T(C): 35.4 (19 Jul 2023 16:00), Max: 36.7 (18 Jul 2023 20:04)  T(F): 95.7 (19 Jul 2023 16:00), Max: 98.1 (18 Jul 2023 20:04)  HR: 92 (19 Jul 2023 16:00) (50 - 92)  BP: 134/74 (19 Jul 2023 04:17) (129/77 - 156/76)  BP(mean): --  RR: 3 (19 Jul 2023 16:00) (3 - 18)  SpO2: 100% (19 Jul 2023 16:00) (95% - 100%)    Parameters below as of 19 Jul 2023 04:17  Patient On (Oxygen Delivery Method): room air        Physical Exam:   General: Normal body habitus, intubated, breathing in sync with the ventilator  Neurology: Somnolent and responds only to loud stimuli  Eyes: bilateral pupils equal and reactive   ENT/Neck: Neck supple, trachea midline, No JVD   Respiratory: Clear bilaterally   CV: S1S2, no murmurs        [x] Sternal dressing, [x] Mediastinal CT x2, [x] R, L Pleural CT        [x] Sinus bradycardia  Abdominal: Soft, NT, ND +BS  Extremities: trace pedal edema noted, + peripheral pulses   Skin: No Rashes, Hematoma, Ecchymosis                             Assessment:  68 y/o M w PMHx of NIDDM2, HTN, HLD, asthma, CPAP on LUCRECIA, sent to the ED by Dr. Hart for work-up of EKG abnormalities, now CAD s/p CABG 07/19/23    Hemodynamic instability  Hypovolemia  Post op respiratory insufficiency  Acute blood loss anemia  Diabetes Mellitus        Plan:   ***Neuro***  Addressed analgesic regimen with prn Dilaudid, Oxycodone, Tylenol, and Gabapentin to optimize function. Continue close monitoring of neurological status. Patient not yet sufficiently alert to follow commands.      ***Cardiovascular***  CAD s/p CABG 07/19/23. Patient has been managed with volume resuscitation and an IV levophed infusion for hypovolemic shock. Levophed now weaned to off.  Invasive hemodynamic monitoring with a central venous catheter & an A-line were required for continuous central venous and MAP/BP monitoring to ensure adequate cardiovascular support. ASA continued for graft occlusion-thromboembolism prophylaxis.      ***Pulmonary***  Patient initially on full ventilator support, subsequently weaned to pressure support while closely monitoring respiratory rate, breathing pattern, pulse ox monitoring, and intermittent blood gas analysis. Patient is generating sufficient tidal volume on pressure support of 5, but is not sufficiently alert for extubation at present. Continue close monitoring of breathing pattern, respiratory rate, the following of continuous pulse oximetry for support and intermittent blood gas analysis to prevent decompensation. History of sleep apnea and may require bipap post extubation.    Mode: CPAP with PS  FiO2: 40  PEEP: 5  PS: 5  MAP: 7  PIP: 11             ***Heme***    Patient had intraoperative hemorrhage and received one unit of packed red blood cells, two units of platelets and ten units of cryoprecipitate earlier today. Monitor hemoglobin and hematocrit levels.       ***GI***  Patient is currently NPO. Continue Protonix for stress ulcer prophylaxis.     ***Renal***  Optimize renal perfusion with adequate volume resuscitation and continued monitoring of urine output, fluid balance, electrolytes, and BUN/Creatinine. Patient has mild hypernatremia and hyperchloremia so will treat with bicarbonate containing solutions.       ***ID***  Afebrile, white blood count within normal limits. Continue trending white blood count and monitoring fever curve. Continue on Cefuroxime for perioperative coverage.       ***Endocrine***  Metabolic stability, history of diabetes mellitus required an IV regular Insulin drip while following serial glucose levels to help achieve and maintain euglycemia.            Patient requires continuous monitoring with bedside rhythm monitoring, pulse oximetry monitoring, and continuous central venous and arterial pressure monitoring; and intermittent blood gas analysis. Care plan discussed with the ICU care team.   Patient remained critical, at risk for life threatening decompensation.    I have spent 35 minutes providing critical care management to this patient.    By signing my name below, I, Raymond Trevizo, attest that this documentation has been prepared under the direction and in the presence of Gail Landis MD   Electronically signed: Val Toribio, 07-19-23 @ 16:59    I, Gail Landis, personally performed the services described in this documentation. all medical record entries made by the rennyibe were at my direction and in my presence. I have reviewed the chart and agree that the record reflects my personal performance and is accurate and complete  Electronically signed: Gail Landis MD

## 2023-07-20 LAB
ALBUMIN SERPL ELPH-MCNC: 3.5 G/DL — SIGNIFICANT CHANGE UP (ref 3.3–5)
ALP SERPL-CCNC: 51 U/L — SIGNIFICANT CHANGE UP (ref 40–120)
ALT FLD-CCNC: 27 U/L — SIGNIFICANT CHANGE UP (ref 10–45)
ANION GAP SERPL CALC-SCNC: 11 MMOL/L — SIGNIFICANT CHANGE UP (ref 5–17)
ANISOCYTOSIS BLD QL: SLIGHT — SIGNIFICANT CHANGE UP
APTT BLD: 28.4 SEC — SIGNIFICANT CHANGE UP (ref 27.5–35.5)
AST SERPL-CCNC: 48 U/L — HIGH (ref 10–40)
BASE EXCESS BLDV CALC-SCNC: -4.8 MMOL/L — LOW (ref -2–3)
BASOPHILS # BLD AUTO: 0 K/UL — SIGNIFICANT CHANGE UP (ref 0–0.2)
BASOPHILS NFR BLD AUTO: 0 % — SIGNIFICANT CHANGE UP (ref 0–2)
BILIRUB SERPL-MCNC: 1.1 MG/DL — SIGNIFICANT CHANGE UP (ref 0.2–1.2)
BUN SERPL-MCNC: 19 MG/DL — SIGNIFICANT CHANGE UP (ref 7–23)
CA-I SERPL-SCNC: 1.17 MMOL/L — SIGNIFICANT CHANGE UP (ref 1.15–1.33)
CALCIUM SERPL-MCNC: 8.3 MG/DL — LOW (ref 8.4–10.5)
CHLORIDE BLDV-SCNC: 109 MMOL/L — HIGH (ref 96–108)
CHLORIDE SERPL-SCNC: 114 MMOL/L — HIGH (ref 96–108)
CO2 BLDV-SCNC: 22 MMOL/L — SIGNIFICANT CHANGE UP (ref 22–26)
CO2 SERPL-SCNC: 21 MMOL/L — LOW (ref 22–31)
CREAT SERPL-MCNC: 1.18 MG/DL — SIGNIFICANT CHANGE UP (ref 0.5–1.3)
EGFR: 68 ML/MIN/1.73M2 — SIGNIFICANT CHANGE UP
ELLIPTOCYTES BLD QL SMEAR: SLIGHT — SIGNIFICANT CHANGE UP
EOSINOPHIL # BLD AUTO: 0 K/UL — SIGNIFICANT CHANGE UP (ref 0–0.5)
EOSINOPHIL NFR BLD AUTO: 0 % — SIGNIFICANT CHANGE UP (ref 0–6)
GAS PNL BLDA: SIGNIFICANT CHANGE UP
GAS PNL BLDV: 141 MMOL/L — SIGNIFICANT CHANGE UP (ref 136–145)
GAS PNL BLDV: SIGNIFICANT CHANGE UP
GAS PNL BLDV: SIGNIFICANT CHANGE UP
GIANT PLATELETS BLD QL SMEAR: PRESENT — SIGNIFICANT CHANGE UP
GLUCOSE BLDC GLUCOMTR-MCNC: 101 MG/DL — HIGH (ref 70–99)
GLUCOSE BLDC GLUCOMTR-MCNC: 102 MG/DL — HIGH (ref 70–99)
GLUCOSE BLDC GLUCOMTR-MCNC: 125 MG/DL — HIGH (ref 70–99)
GLUCOSE BLDC GLUCOMTR-MCNC: 137 MG/DL — HIGH (ref 70–99)
GLUCOSE BLDC GLUCOMTR-MCNC: 138 MG/DL — HIGH (ref 70–99)
GLUCOSE BLDC GLUCOMTR-MCNC: 144 MG/DL — HIGH (ref 70–99)
GLUCOSE BLDC GLUCOMTR-MCNC: 147 MG/DL — HIGH (ref 70–99)
GLUCOSE BLDC GLUCOMTR-MCNC: 150 MG/DL — HIGH (ref 70–99)
GLUCOSE BLDC GLUCOMTR-MCNC: 155 MG/DL — HIGH (ref 70–99)
GLUCOSE BLDC GLUCOMTR-MCNC: 156 MG/DL — HIGH (ref 70–99)
GLUCOSE BLDC GLUCOMTR-MCNC: 185 MG/DL — HIGH (ref 70–99)
GLUCOSE BLDC GLUCOMTR-MCNC: 186 MG/DL — HIGH (ref 70–99)
GLUCOSE BLDC GLUCOMTR-MCNC: 188 MG/DL — HIGH (ref 70–99)
GLUCOSE BLDC GLUCOMTR-MCNC: 192 MG/DL — HIGH (ref 70–99)
GLUCOSE BLDC GLUCOMTR-MCNC: 229 MG/DL — HIGH (ref 70–99)
GLUCOSE BLDC GLUCOMTR-MCNC: 231 MG/DL — HIGH (ref 70–99)
GLUCOSE BLDC GLUCOMTR-MCNC: 98 MG/DL — SIGNIFICANT CHANGE UP (ref 70–99)
GLUCOSE BLDV-MCNC: 232 MG/DL — HIGH (ref 70–99)
GLUCOSE SERPL-MCNC: 107 MG/DL — HIGH (ref 70–99)
HCO3 BLDV-SCNC: 20 MMOL/L — LOW (ref 22–29)
HCT VFR BLD CALC: 28 % — LOW (ref 39–50)
HCT VFR BLDA CALC: 30 % — LOW (ref 39–51)
HGB BLD CALC-MCNC: 10 G/DL — LOW (ref 12.6–17.4)
HGB BLD-MCNC: 9 G/DL — LOW (ref 13–17)
HOROWITZ INDEX BLDV+IHG-RTO: 36 — SIGNIFICANT CHANGE UP
INR BLD: 1.27 RATIO — HIGH (ref 0.88–1.16)
LACTATE BLDV-MCNC: 2.7 MMOL/L — HIGH (ref 0.5–2)
LYMPHOCYTES # BLD AUTO: 0.61 K/UL — LOW (ref 1–3.3)
LYMPHOCYTES # BLD AUTO: 9.5 % — LOW (ref 13–44)
MACROCYTES BLD QL: SLIGHT — SIGNIFICANT CHANGE UP
MAGNESIUM SERPL-MCNC: 2.6 MG/DL — SIGNIFICANT CHANGE UP (ref 1.6–2.6)
MANUAL SMEAR VERIFICATION: SIGNIFICANT CHANGE UP
MCHC RBC-ENTMCNC: 27.1 PG — SIGNIFICANT CHANGE UP (ref 27–34)
MCHC RBC-ENTMCNC: 32.1 GM/DL — SIGNIFICANT CHANGE UP (ref 32–36)
MCV RBC AUTO: 84.3 FL — SIGNIFICANT CHANGE UP (ref 80–100)
MONOCYTES # BLD AUTO: 0.34 K/UL — SIGNIFICANT CHANGE UP (ref 0–0.9)
MONOCYTES NFR BLD AUTO: 5.2 % — SIGNIFICANT CHANGE UP (ref 2–14)
NEUTROPHILS # BLD AUTO: 5.5 K/UL — SIGNIFICANT CHANGE UP (ref 1.8–7.4)
NEUTROPHILS NFR BLD AUTO: 83.6 % — HIGH (ref 43–77)
NEUTS BAND # BLD: 1.7 % — SIGNIFICANT CHANGE UP (ref 0–8)
OVALOCYTES BLD QL SMEAR: SLIGHT — SIGNIFICANT CHANGE UP
PCO2 BLDV: 38 MMHG — LOW (ref 42–55)
PH BLDV: 7.34 — SIGNIFICANT CHANGE UP (ref 7.32–7.43)
PHOSPHATE SERPL-MCNC: 2.9 MG/DL — SIGNIFICANT CHANGE UP (ref 2.5–4.5)
PLAT MORPH BLD: NORMAL — SIGNIFICANT CHANGE UP
PLATELET # BLD AUTO: 159 K/UL — SIGNIFICANT CHANGE UP (ref 150–400)
PO2 BLDV: 38 MMHG — SIGNIFICANT CHANGE UP (ref 25–45)
POIKILOCYTOSIS BLD QL AUTO: SLIGHT — SIGNIFICANT CHANGE UP
POTASSIUM BLDV-SCNC: 3.9 MMOL/L — SIGNIFICANT CHANGE UP (ref 3.5–5.1)
POTASSIUM SERPL-MCNC: 4.5 MMOL/L — SIGNIFICANT CHANGE UP (ref 3.5–5.3)
POTASSIUM SERPL-SCNC: 4.5 MMOL/L — SIGNIFICANT CHANGE UP (ref 3.5–5.3)
PROT SERPL-MCNC: 5.3 G/DL — LOW (ref 6–8.3)
PROTHROM AB SERPL-ACNC: 14.8 SEC — HIGH (ref 10.5–13.4)
RBC # BLD: 3.32 M/UL — LOW (ref 4.2–5.8)
RBC # FLD: 15.5 % — HIGH (ref 10.3–14.5)
RBC BLD AUTO: SIGNIFICANT CHANGE UP
SAO2 % BLDV: 64.3 % — LOW (ref 67–88)
SODIUM SERPL-SCNC: 146 MMOL/L — HIGH (ref 135–145)
WBC # BLD: 6.45 K/UL — SIGNIFICANT CHANGE UP (ref 3.8–10.5)
WBC # FLD AUTO: 6.45 K/UL — SIGNIFICANT CHANGE UP (ref 3.8–10.5)

## 2023-07-20 PROCEDURE — 71045 X-RAY EXAM CHEST 1 VIEW: CPT | Mod: 26

## 2023-07-20 PROCEDURE — 99291 CRITICAL CARE FIRST HOUR: CPT

## 2023-07-20 RX ORDER — ENOXAPARIN SODIUM 100 MG/ML
40 INJECTION SUBCUTANEOUS EVERY 24 HOURS
Refills: 0 | Status: DISCONTINUED | OUTPATIENT
Start: 2023-07-20 | End: 2023-07-25

## 2023-07-20 RX ORDER — METOPROLOL TARTRATE 50 MG
5 TABLET ORAL ONCE
Refills: 0 | Status: COMPLETED | OUTPATIENT
Start: 2023-07-20 | End: 2023-07-20

## 2023-07-20 RX ORDER — ACETAMINOPHEN 500 MG
1000 TABLET ORAL ONCE
Refills: 0 | Status: COMPLETED | OUTPATIENT
Start: 2023-07-20 | End: 2023-07-20

## 2023-07-20 RX ORDER — INSULIN LISPRO 100/ML
VIAL (ML) SUBCUTANEOUS
Refills: 0 | Status: DISCONTINUED | OUTPATIENT
Start: 2023-07-20 | End: 2023-07-20

## 2023-07-20 RX ORDER — ATORVASTATIN CALCIUM 80 MG/1
80 TABLET, FILM COATED ORAL AT BEDTIME
Refills: 0 | Status: DISCONTINUED | OUTPATIENT
Start: 2023-07-20 | End: 2023-07-25

## 2023-07-20 RX ORDER — ASPIRIN/CALCIUM CARB/MAGNESIUM 324 MG
300 TABLET ORAL ONCE
Refills: 0 | Status: DISCONTINUED | OUTPATIENT
Start: 2023-07-20 | End: 2023-07-20

## 2023-07-20 RX ORDER — METOPROLOL TARTRATE 50 MG
25 TABLET ORAL EVERY 8 HOURS
Refills: 0 | Status: DISCONTINUED | OUTPATIENT
Start: 2023-07-20 | End: 2023-07-24

## 2023-07-20 RX ORDER — AMLODIPINE BESYLATE 2.5 MG/1
5 TABLET ORAL DAILY
Refills: 0 | Status: DISCONTINUED | OUTPATIENT
Start: 2023-07-20 | End: 2023-07-25

## 2023-07-20 RX ORDER — POTASSIUM CHLORIDE 20 MEQ
10 PACKET (EA) ORAL
Refills: 0 | Status: COMPLETED | OUTPATIENT
Start: 2023-07-20 | End: 2023-07-21

## 2023-07-20 RX ORDER — NICARDIPINE HYDROCHLORIDE 30 MG/1
5 CAPSULE, EXTENDED RELEASE ORAL
Qty: 40 | Refills: 0 | Status: DISCONTINUED | OUTPATIENT
Start: 2023-07-20 | End: 2023-07-20

## 2023-07-20 RX ORDER — HYDRALAZINE HCL 50 MG
10 TABLET ORAL ONCE
Refills: 0 | Status: COMPLETED | OUTPATIENT
Start: 2023-07-20 | End: 2023-07-20

## 2023-07-20 RX ORDER — PANTOPRAZOLE SODIUM 20 MG/1
40 TABLET, DELAYED RELEASE ORAL
Refills: 0 | Status: DISCONTINUED | OUTPATIENT
Start: 2023-07-20 | End: 2023-07-25

## 2023-07-20 RX ORDER — METOPROLOL TARTRATE 50 MG
12.5 TABLET ORAL EVERY 12 HOURS
Refills: 0 | Status: DISCONTINUED | OUTPATIENT
Start: 2023-07-20 | End: 2023-07-20

## 2023-07-20 RX ORDER — SODIUM BICARBONATE 1 MEQ/ML
50 SYRINGE (ML) INTRAVENOUS ONCE
Refills: 0 | Status: COMPLETED | OUTPATIENT
Start: 2023-07-20 | End: 2023-07-20

## 2023-07-20 RX ADMIN — Medication 1000 MILLIGRAM(S): at 12:00

## 2023-07-20 RX ADMIN — Medication 50 MILLIEQUIVALENT(S): at 14:34

## 2023-07-20 RX ADMIN — Medication 81 MILLIGRAM(S): at 16:48

## 2023-07-20 RX ADMIN — MUPIROCIN 1 APPLICATION(S): 20 OINTMENT TOPICAL at 17:29

## 2023-07-20 RX ADMIN — OXYCODONE HYDROCHLORIDE 5 MILLIGRAM(S): 5 TABLET ORAL at 22:15

## 2023-07-20 RX ADMIN — Medication 100 MILLIGRAM(S): at 11:32

## 2023-07-20 RX ADMIN — ATORVASTATIN CALCIUM 80 MILLIGRAM(S): 80 TABLET, FILM COATED ORAL at 22:06

## 2023-07-20 RX ADMIN — AMLODIPINE BESYLATE 5 MILLIGRAM(S): 2.5 TABLET ORAL at 09:11

## 2023-07-20 RX ADMIN — Medication 10 MILLIGRAM(S): at 09:55

## 2023-07-20 RX ADMIN — CHLORHEXIDINE GLUCONATE 1 APPLICATION(S): 213 SOLUTION TOPICAL at 11:33

## 2023-07-20 RX ADMIN — ENOXAPARIN SODIUM 40 MILLIGRAM(S): 100 INJECTION SUBCUTANEOUS at 06:04

## 2023-07-20 RX ADMIN — NICARDIPINE HYDROCHLORIDE 25 MG/HR: 30 CAPSULE, EXTENDED RELEASE ORAL at 07:57

## 2023-07-20 RX ADMIN — Medication 650 MILLIGRAM(S): at 06:03

## 2023-07-20 RX ADMIN — Medication 400 MILLIGRAM(S): at 00:03

## 2023-07-20 RX ADMIN — SODIUM CHLORIDE 10 MILLILITER(S): 9 INJECTION INTRAMUSCULAR; INTRAVENOUS; SUBCUTANEOUS at 07:57

## 2023-07-20 RX ADMIN — HYDROMORPHONE HYDROCHLORIDE 0.5 MILLIGRAM(S): 2 INJECTION INTRAMUSCULAR; INTRAVENOUS; SUBCUTANEOUS at 03:55

## 2023-07-20 RX ADMIN — Medication 10 MILLIGRAM(S): at 14:34

## 2023-07-20 RX ADMIN — Medication 100 MILLIGRAM(S): at 04:05

## 2023-07-20 RX ADMIN — INSULIN HUMAN 3 UNIT(S)/HR: 100 INJECTION, SOLUTION SUBCUTANEOUS at 11:56

## 2023-07-20 RX ADMIN — OXYCODONE HYDROCHLORIDE 5 MILLIGRAM(S): 5 TABLET ORAL at 21:13

## 2023-07-20 RX ADMIN — Medication 12.5 MILLIGRAM(S): at 06:03

## 2023-07-20 RX ADMIN — MUPIROCIN 1 APPLICATION(S): 20 OINTMENT TOPICAL at 06:04

## 2023-07-20 RX ADMIN — Medication 500 MILLIGRAM(S): at 17:29

## 2023-07-20 RX ADMIN — Medication 100 MILLIGRAM(S): at 20:30

## 2023-07-20 RX ADMIN — Medication 5 MILLIGRAM(S): at 06:51

## 2023-07-20 RX ADMIN — HYDROMORPHONE HYDROCHLORIDE 0.5 MILLIGRAM(S): 2 INJECTION INTRAMUSCULAR; INTRAVENOUS; SUBCUTANEOUS at 04:10

## 2023-07-20 RX ADMIN — Medication 500 MILLIGRAM(S): at 06:03

## 2023-07-20 RX ADMIN — Medication 1000 MILLIGRAM(S): at 00:18

## 2023-07-20 RX ADMIN — Medication 400 MILLIGRAM(S): at 11:20

## 2023-07-20 RX ADMIN — HYDROMORPHONE HYDROCHLORIDE 0.5 MILLIGRAM(S): 2 INJECTION INTRAMUSCULAR; INTRAVENOUS; SUBCUTANEOUS at 10:30

## 2023-07-20 RX ADMIN — SENNA PLUS 2 TABLET(S): 8.6 TABLET ORAL at 22:06

## 2023-07-20 RX ADMIN — Medication 650 MILLIGRAM(S): at 17:29

## 2023-07-20 RX ADMIN — HYDROMORPHONE HYDROCHLORIDE 0.5 MILLIGRAM(S): 2 INJECTION INTRAMUSCULAR; INTRAVENOUS; SUBCUTANEOUS at 09:56

## 2023-07-20 RX ADMIN — Medication 5 MILLIGRAM(S): at 23:22

## 2023-07-20 RX ADMIN — Medication 650 MILLIGRAM(S): at 06:33

## 2023-07-20 RX ADMIN — Medication 5 MILLIGRAM(S): at 11:20

## 2023-07-20 RX ADMIN — GABAPENTIN 100 MILLIGRAM(S): 400 CAPSULE ORAL at 06:03

## 2023-07-20 RX ADMIN — Medication 25 MILLIGRAM(S): at 22:06

## 2023-07-20 RX ADMIN — Medication 25 MILLIGRAM(S): at 17:29

## 2023-07-20 RX ADMIN — INSULIN HUMAN 3 UNIT(S)/HR: 100 INJECTION, SOLUTION SUBCUTANEOUS at 20:36

## 2023-07-20 RX ADMIN — GABAPENTIN 100 MILLIGRAM(S): 400 CAPSULE ORAL at 22:06

## 2023-07-20 NOTE — PROVIDER CONTACT NOTE (CHANGE IN STATUS NOTIFICATION) - ASSESSMENT
Patient with change in neurological exam, A/0x1, unable to follow commands, no effort x4 extremities. Patient A/ox1 to person Patient with change in neurological exam, A/0x1, unable to follow commands, no effort x4 extremities. Patient A/ox1 to person. Patient with increased lethargy/ hypoactive affect. Speech slowed  perceived expressive aphasia. Patient able to follow simple one step commands. Patient s/p walk, 6L NC, hemodynamically stable. See vital signs flowsheet, see A/I flowsheet Patient with change in neurological exam, A/0x4 at baseline. At this time, patient A/0x1, unable to follow commands, no effort x4 extremities. Patient A/ox1 to person. Patient with increased lethargy/ hypoactive affect. Speech slowed  perceived expressive aphasia. . Patient s/p walk, 6L NC, hemodynamically stable. See vital signs flowsheet, see A/I flowsheet

## 2023-07-20 NOTE — PROGRESS NOTE ADULT - ASSESSMENT
_________________________________________________________________________________________  ========>>  M E D I C A L   A T T E N D I N G    F O L L O W  U P  N O T E  <<=========  -----------------------------------------------------------------------------------------------------    - Patient seen and examined by me earlier today.  patient seen post op CABG, in CTICU  - In summary,  GILA SHAH is a 67y year old man admitted with CP / CAD   - Patient doing ok post outpatient CABG X5! comfortable, intubated / sedated / on pressors, insulin drip.. stable     ==================>> REVIEW OF SYSTEM <<=================        ==================>> PHYSICAL EXAM <<=================    GEN: intubated, sedated, vented, resting, NAD , comfortable, bear hugger on   HEENT: NCAT, lines and tubes in place   Neck: supple , lines and tubes in place   CVS: S1S2 , regular   PULM: CTA B/L,  limited ... chest tubes in place   ABD.: soft. non tender, non distended  Extrem: intact pulses , no edema , + betty                                ( Note written / Date of service 07-20-23 )    ==================>> MEDICATIONS <<====================    acetaminophen     Tablet .. 650 milliGRAM(s) Oral every 6 hours  amLODIPine   Tablet 5 milliGRAM(s) Oral daily  ascorbic acid 500 milliGRAM(s) Oral two times a day  aspirin enteric coated 81 milliGRAM(s) Oral daily  aspirin Suppository 300 milliGRAM(s) Rectal once  atorvastatin 80 milliGRAM(s) Oral at bedtime  cefuroxime  IVPB 1500 milliGRAM(s) IV Intermittent every 8 hours  chlorhexidine 2% Cloths 1 Application(s) Topical daily  enoxaparin Injectable 40 milliGRAM(s) SubCutaneous every 24 hours  gabapentin 100 milliGRAM(s) Oral every 8 hours  insulin lispro (ADMELOG) corrective regimen sliding scale   SubCutaneous Before meals and at bedtime  insulin regular Infusion 3 Unit(s)/Hr IV Continuous <Continuous>  metoprolol tartrate 25 milliGRAM(s) Oral every 8 hours  mupirocin 2% Ointment 1 Application(s) Both Nostrils two times a day  niCARdipine Infusion 5 mG/Hr IV Continuous <Continuous>  pantoprazole    Tablet 40 milliGRAM(s) Oral before breakfast  polyethylene glycol 3350 17 Gram(s) Oral daily  senna 2 Tablet(s) Oral at bedtime  sodium chloride 0.9%. 1000 milliLiter(s) IV Continuous <Continuous>    MEDICATIONS  (PRN):  HYDROmorphone  Injectable 0.5 milliGRAM(s) IV Push every 6 hours PRN Breakthrough Pain  oxyCODONE    IR 10 milliGRAM(s) Oral every 4 hours PRN Severe Pain (7 - 10)  oxyCODONE    IR 5 milliGRAM(s) Oral every 4 hours PRN Moderate Pain (4 - 6)    ___________  Active diet:  Diet, Regular:   Consistent Carbohydrate No Snacks (CSTCHO)  Diet, Clear Liquid:   Consistent Carbohydrate No Snacks (CSTCHO)  ___________________    ==================>> VITAL SIGNS <<==================  Height (cm): 162.6  Weight (kg): 79  BMI (kg/m2): 29.9  Vital Signs Last 24 HrsT(C): 37.4 (07-20-23 @ 12:00)  T(F): 99.3 (07-20-23 @ 12:00), Max: 100.8 (07-20-23 @ 00:00)  HR: 94 (07-20-23 @ 13:00) (83 - 104)  BP: 134/66 (07-20-23 @ 13:00)  RR: 11 (07-20-23 @ 13:00) (1 - 21)  SpO2: 94% (07-20-23 @ 13:00) (89% - 100%)      CAPILLARY BLOOD GLUCOSE      POCT Blood Glucose.: 186 mg/dL (20 Jul 2023 15:04)  POCT Blood Glucose.: 192 mg/dL (20 Jul 2023 14:20)  POCT Blood Glucose.: 229 mg/dL (20 Jul 2023 13:13)  POCT Blood Glucose.: 231 mg/dL (20 Jul 2023 11:37)  POCT Blood Glucose.: 156 mg/dL (20 Jul 2023 07:53)  POCT Blood Glucose.: 144 mg/dL (20 Jul 2023 06:53)  POCT Blood Glucose.: 125 mg/dL (20 Jul 2023 04:01)  POCT Blood Glucose.: 98 mg/dL (20 Jul 2023 02:52)  POCT Blood Glucose.: 101 mg/dL (20 Jul 2023 02:07)  POCT Blood Glucose.: 102 mg/dL (20 Jul 2023 01:05)  POCT Blood Glucose.: 108 mg/dL (19 Jul 2023 23:02)  POCT Blood Glucose.: 109 mg/dL (19 Jul 2023 22:06)  POCT Blood Glucose.: 115 mg/dL (19 Jul 2023 20:56)  POCT Blood Glucose.: 117 mg/dL (19 Jul 2023 19:58)  POCT Blood Glucose.: 137 mg/dL (19 Jul 2023 19:02)  POCT Blood Glucose.: 145 mg/dL (19 Jul 2023 18:18)  POCT Blood Glucose.: 144 mg/dL (19 Jul 2023 16:59)  POCT Blood Glucose.: 160 mg/dL (19 Jul 2023 16:07)     ==================>> LAB AND IMAGING <<==================                        9.0    6.45  )-----------( 159      ( 20 Jul 2023 00:33 )             28.0        07-20    146<H>  |  114<H>  |  19  ----------------------------<  107<H>  4.5   |  21<L>  |  1.18    Ca    8.3<L>      20 Jul 2023 00:33  Phos  2.9     07-20  Mg     2.6     07-20    TPro  5.3<L>  /  Alb  3.5  /  TBili  1.1  /  DBili  x   /  AST  48<H>  /  ALT  27  /  AlkPhos  51  07-20    WBC count:   6.45 <<== ,  7.31 <<== ,  6.48 <<== ,  6.58 <<== ,  5.34 <<==   Hemoglobin:   9.0 <<==,  10.1 <<==,  14.5 <<==,  14.6 <<==,  14.1 <<==  platelets:  159 <==, 155 <==, 182 <==, 190 <==, 169 <==, 177 <==    Creatinine:  1.18  <<==, 0.96  <<==, 1.07  <<==, 1.07  <<==, 1.02  <<==, 1.04  <<==  Sodium:   146  <==, 147  <==, 137  <==, 135  <==, 138  <==, 137  <==       AST:          48 <== , 59 <== , 27 <== , 33 <==      ALT:        27  <== , 31  <== , 42  <== , 23  <==      AP:        51  <=, 59  <=, 94  <=, 84  <=     Bili:        1.1  <=, 1.4  <=, 0.9  <=, 0.4  <=    ____________________________    M I C R O B I O L O G Y :          < from: VA Duplex Carotid, Bilat (07.18.23 @ 10:17) >  IMPRESSION: No significant hemodynamic stenosis of either carotid artery.  < end of copied text >    < from: TTE W or WO Ultrasound Enhancing Agent (07.18.23 @ 08:37) >  CONCLUSIONS:    1. Normal left ventricular cavity size. The left ventricular wall thickness is normal. The left ventricular systolic function is normal with an ejection fraction of 65 % by Black's method of disks. There are regional wall motion abnormalities No evidence of a thrombus in the left ventricle.   2. Mid inferolateral segment and mid anterolateral segment are abnormal.      Mid inferolateral segment and mid anterolateral segment are abnormal.   3. Normal atria.   4. Normal right ventricular cavity size, normal right ventricular wall thickness and normal right ventricular systolic function. The tricuspid annular plane systolic excursion (TAPSE) is 2.1 cm (normal >=1.7 cm).   5. No significant valvular disease.   6. No pericardial effusion seen.   7. No prior echocardiogram is available for comparison.  < end of copied text >    < from: Cardiac Catheterization (07.14.23 @ 09:51) >  Procedures:                 1.    Arterial Access - Right Radial   2.    Left Heart Cath   3.    Diagnostic Coronary Angiography   Primary ICD-10: I25.110 - Atherosclerotic heart disease of native  coronary artery with unstable angina pectoris   Secondary ICD-10   E11.59 - Type 2 diabetes mellitus with other circulatory complications  I10 - Essential (primary) hypertension     Diagnostic Conclusions:   Patient has severe triple vessel disease as described with progressive  symptoms of angin    Recommendations:   Revascularization with CABG. Transfer to [facility]   < end of copied text >    ___________________________________________________________________________________  ===============>>  A S S E S S M E N T   A N D   P L A N <<===============  ------------------------------------------------------------------------------------------    · Assessment	  67-year-old male with NIDDM2, HTN, HLD, asthma, CPAP on LUCRECIA, sent to the ED by Dr. Hart for work-up of EKG abnormalities.     Problem/Plan - 1:  ·  Problem: Chest pain / Angina in patient with severe CAD.    Patient status post CABG, in CTICU   Appreciated CT surgery, critical care, CT ICU  monitor on tele  Wean off pressers, vent, sedation, and Insulin drip as able  med optimization per cardiologist  Management of chest tubes as per surgery team  pain management as needed  supportive care      Problem/Plan - 2:  ·  Problem: type 2 diabetes mellitus.   hold oral meds while inpatient  FS Q6H w/ISS.  monitor  FS closely asia- op  endocrine appreciated      Problem/Plan - 3:  ·  Problem: LUCRECIA on CPAP.   > would need CPAP Nightly Post extubaiton     --------------------------------------------  Case discussed with JOSEPH..   ___________________________  Will follow with you  thank you  NOREEN Wilkinson D.O.  Pager: [phone]     _________________________________________________________________________________________  ========>>  M E D I C A L   A T T E N D I N G    F O L L O W  U P  N O T E  <<=========  -----------------------------------------------------------------------------------------------------    - Patient seen and examined by me earlier today.  patient seen post op CABG, in CTICU  - In summary,  GILA SHAH is a 67y year old man admitted with CP / CAD   - Patient doing ok post op CABG X5! comfortable, extubated , in chair harpreet starting PO     ==================>> REVIEW OF SYSTEM <<=================    GEN: no fever, no chills, no pain  RESP: no SOB, no cough, no sputum  CVS: no chest pain, no palpitations, no edema  GI: no abdominal pain, no nausea, no vomiting, no constipation, no diarrhea  : no dysuria, no frequency, no hematuria  NEURO: no headache, no dizziness  PSYCH: no depression, not anxious  Derm : no itching, no rash    ==================>> PHYSICAL EXAM <<=================    GEN: intubated, sedated, vented, resting, NAD , comfortable, bear hugger on   HEENT: NCAT, lines and tubes in place   Neck: supple , lines and tubes in place   CVS: S1S2 , regular   PULM: CTA B/L,  limited ... chest tubes in place   ABD.: soft. non tender, non distended  Extrem: intact pulses , no edema , + hernández                                ( Note written / Date of service 07-20-23 )    ==================>> MEDICATIONS <<====================    acetaminophen     Tablet .. 650 milliGRAM(s) Oral every 6 hours  amLODIPine   Tablet 5 milliGRAM(s) Oral daily  ascorbic acid 500 milliGRAM(s) Oral two times a day  aspirin enteric coated 81 milliGRAM(s) Oral daily  aspirin Suppository 300 milliGRAM(s) Rectal once  atorvastatin 80 milliGRAM(s) Oral at bedtime  cefuroxime  IVPB 1500 milliGRAM(s) IV Intermittent every 8 hours  chlorhexidine 2% Cloths 1 Application(s) Topical daily  enoxaparin Injectable 40 milliGRAM(s) SubCutaneous every 24 hours  gabapentin 100 milliGRAM(s) Oral every 8 hours  insulin lispro (ADMELOG) corrective regimen sliding scale   SubCutaneous Before meals and at bedtime  insulin regular Infusion 3 Unit(s)/Hr IV Continuous <Continuous>  metoprolol tartrate 25 milliGRAM(s) Oral every 8 hours  mupirocin 2% Ointment 1 Application(s) Both Nostrils two times a day  niCARdipine Infusion 5 mG/Hr IV Continuous <Continuous>  pantoprazole    Tablet 40 milliGRAM(s) Oral before breakfast  polyethylene glycol 3350 17 Gram(s) Oral daily  senna 2 Tablet(s) Oral at bedtime  sodium chloride 0.9%. 1000 milliLiter(s) IV Continuous <Continuous>    MEDICATIONS  (PRN):  HYDROmorphone  Injectable 0.5 milliGRAM(s) IV Push every 6 hours PRN Breakthrough Pain  oxyCODONE    IR 10 milliGRAM(s) Oral every 4 hours PRN Severe Pain (7 - 10)  oxyCODONE    IR 5 milliGRAM(s) Oral every 4 hours PRN Moderate Pain (4 - 6)    ___________  Active diet:  Diet, Regular:   Consistent Carbohydrate No Snacks (CSTCHO)  Diet, Clear Liquid:   Consistent Carbohydrate No Snacks (CSTCHO)  ___________________    ==================>> VITAL SIGNS <<==================  Height (cm): 162.6  Weight (kg): 79  BMI (kg/m2): 29.9  Vital Signs Last 24 HrsT(C): 37.4 (07-20-23 @ 12:00)  T(F): 99.3 (07-20-23 @ 12:00), Max: 100.8 (07-20-23 @ 00:00)  HR: 94 (07-20-23 @ 13:00) (83 - 104)  BP: 134/66 (07-20-23 @ 13:00)  RR: 11 (07-20-23 @ 13:00) (1 - 21)  SpO2: 94% (07-20-23 @ 13:00) (89% - 100%)      CAPILLARY BLOOD GLUCOSE      POCT Blood Glucose.: 186 mg/dL (20 Jul 2023 15:04)  POCT Blood Glucose.: 192 mg/dL (20 Jul 2023 14:20)  POCT Blood Glucose.: 229 mg/dL (20 Jul 2023 13:13)  POCT Blood Glucose.: 231 mg/dL (20 Jul 2023 11:37)  POCT Blood Glucose.: 156 mg/dL (20 Jul 2023 07:53)  POCT Blood Glucose.: 144 mg/dL (20 Jul 2023 06:53)  POCT Blood Glucose.: 125 mg/dL (20 Jul 2023 04:01)  POCT Blood Glucose.: 98 mg/dL (20 Jul 2023 02:52)  POCT Blood Glucose.: 101 mg/dL (20 Jul 2023 02:07)  POCT Blood Glucose.: 102 mg/dL (20 Jul 2023 01:05)  POCT Blood Glucose.: 108 mg/dL (19 Jul 2023 23:02)  POCT Blood Glucose.: 109 mg/dL (19 Jul 2023 22:06)  POCT Blood Glucose.: 115 mg/dL (19 Jul 2023 20:56)  POCT Blood Glucose.: 117 mg/dL (19 Jul 2023 19:58)  POCT Blood Glucose.: 137 mg/dL (19 Jul 2023 19:02)  POCT Blood Glucose.: 145 mg/dL (19 Jul 2023 18:18)  POCT Blood Glucose.: 144 mg/dL (19 Jul 2023 16:59)  POCT Blood Glucose.: 160 mg/dL (19 Jul 2023 16:07)     ==================>> LAB AND IMAGING <<==================                        9.0    6.45  )-----------( 159      ( 20 Jul 2023 00:33 )             28.0        07-20    146<H>  |  114<H>  |  19  ----------------------------<  107<H>  4.5   |  21<L>  |  1.18    Ca    8.3<L>      20 Jul 2023 00:33  Phos  2.9     07-20  Mg     2.6     07-20    TPro  5.3<L>  /  Alb  3.5  /  TBili  1.1  /  DBili  x   /  AST  48<H>  /  ALT  27  /  AlkPhos  51  07-20    WBC count:   6.45 <<== ,  7.31 <<== ,  6.48 <<== ,  6.58 <<== ,  5.34 <<==   Hemoglobin:   9.0 <<==,  10.1 <<==,  14.5 <<==,  14.6 <<==,  14.1 <<==  platelets:  159 <==, 155 <==, 182 <==, 190 <==, 169 <==, 177 <==    Creatinine:  1.18  <<==, 0.96  <<==, 1.07  <<==, 1.07  <<==, 1.02  <<==, 1.04  <<==  Sodium:   146  <==, 147  <==, 137  <==, 135  <==, 138  <==, 137  <==       AST:          48 <== , 59 <== , 27 <== , 33 <==      ALT:        27  <== , 31  <== , 42  <== , 23  <==      AP:        51  <=, 59  <=, 94  <=, 84  <=     Bili:        1.1  <=, 1.4  <=, 0.9  <=, 0.4  <=    ____________________________    M I C R O B I O L O G Y :          < from: VA Duplex Carotid, Bilat (07.18.23 @ 10:17) >  IMPRESSION: No significant hemodynamic stenosis of either carotid artery.  < end of copied text >    < from: TTE W or WO Ultrasound Enhancing Agent (07.18.23 @ 08:37) >  CONCLUSIONS:    1. Normal left ventricular cavity size. The left ventricular wall thickness is normal. The left ventricular systolic function is normal with an ejection fraction of 65 % by Black's method of disks. There are regional wall motion abnormalities No evidence of a thrombus in the left ventricle.   2. Mid inferolateral segment and mid anterolateral segment are abnormal.      Mid inferolateral segment and mid anterolateral segment are abnormal.   3. Normal atria.   4. Normal right ventricular cavity size, normal right ventricular wall thickness and normal right ventricular systolic function. The tricuspid annular plane systolic excursion (TAPSE) is 2.1 cm (normal >=1.7 cm).   5. No significant valvular disease.   6. No pericardial effusion seen.   7. No prior echocardiogram is available for comparison.  < end of copied text >    < from: Cardiac Catheterization (07.14.23 @ 09:51) >  Procedures:                 1.    Arterial Access - Right Radial   2.    Left Heart Cath   3.    Diagnostic Coronary Angiography   Primary ICD-10: I25.110 - Atherosclerotic heart disease of native  coronary artery with unstable angina pectoris   Secondary ICD-10   E11.59 - Type 2 diabetes mellitus with other circulatory complications  I10 - Essential (primary) hypertension     Diagnostic Conclusions:   Patient has severe triple vessel disease as described with progressive  symptoms of angin    Recommendations:   Revascularization with CABG. Transfer to [facility]   < end of copied text >    ___________________________________________________________________________________  ===============>>  A S S E S S M E N T   A N D   P L A N <<===============  ------------------------------------------------------------------------------------------    · Assessment	  67-year-old male with NIDDM2, HTN, HLD, asthma, CPAP on LUCRECIA, sent to the ED by Dr. Hart for work-up of EKG abnormalities.     Problem/Plan - 1:  ·  Problem: Chest pain / Angina in patient with severe CAD.    Patient status post CABG, in CTICU   Appreciated CT surgery, critical care, CT ICU  monitor on tele  Wean off pressers, vent, sedation, and Insulin drip as able  med optimization per cardiologist  Management of chest tubes as per surgery team  pain management as needed  supportive care      Problem/Plan - 2:  ·  Problem: type 2 diabetes mellitus.   hold oral meds while inpatient  FS Q6H w/ISS.  monitor  FS closely asia- op  endocrine appreciated      Problem/Plan - 3:  ·  Problem: LUCRECIA on CPAP.   > would need CPAP Nightly Post extubaiton     --------------------------------------------  Case discussed with RN..   ___________________________  Will follow with you  thank you  NOREEN Wilkinson D.O.  Pager: [phone]     _________________________________________________________________________________________  ========>>  M E D I C A L   A T T E N D I N G    F O L L O W  U P  N O T E  <<=========  -----------------------------------------------------------------------------------------------------    - Patient seen and examined by me earlier today.  patient seen post op CABG, in CTICU  - In summary,  GILA SHAH is a 67y year old man admitted with CP / CAD   - Patient doing ok post op CABG X5! comfortable, extubated , in chair today, starting PO     ==================>> REVIEW OF SYSTEM <<=================    GEN: no fever, no chills, + some pain post op  RESP: no SOB, occasional cough, no sputum  CVS:  chest pain, no palpitations, no edema  GI: no abdominal pain, no nausea, no vomiting  : no dysuria, no issues with hernández   NEURO: no headache, no dizziness  PSYCH: no depression, not anxious  Derm : no itching, no rash    ==================>> PHYSICAL EXAM <<=================    GEN: Alert, Oriented, in chair, NAD , comfortable  HEENT: NCAT, lines and tubes in place   Neck: supple , lines and tubes in place   CVS: S1S2 , regular   PULM: CTA B/L,  limited ... chest tubes in place   ABD.: soft. non tender, non distended  Extrem: legs wrapped,+ hernández   neuro-Psych: alert, flat affect                               ( Note written / Date of service 07-20-23 )    ==================>> MEDICATIONS <<====================    acetaminophen     Tablet .. 650 milliGRAM(s) Oral every 6 hours  amLODIPine   Tablet 5 milliGRAM(s) Oral daily  ascorbic acid 500 milliGRAM(s) Oral two times a day  aspirin enteric coated 81 milliGRAM(s) Oral daily  aspirin Suppository 300 milliGRAM(s) Rectal once  atorvastatin 80 milliGRAM(s) Oral at bedtime  cefuroxime  IVPB 1500 milliGRAM(s) IV Intermittent every 8 hours  chlorhexidine 2% Cloths 1 Application(s) Topical daily  enoxaparin Injectable 40 milliGRAM(s) SubCutaneous every 24 hours  gabapentin 100 milliGRAM(s) Oral every 8 hours  insulin lispro (ADMELOG) corrective regimen sliding scale   SubCutaneous Before meals and at bedtime  insulin regular Infusion 3 Unit(s)/Hr IV Continuous <Continuous>  metoprolol tartrate 25 milliGRAM(s) Oral every 8 hours  mupirocin 2% Ointment 1 Application(s) Both Nostrils two times a day  niCARdipine Infusion 5 mG/Hr IV Continuous <Continuous>  pantoprazole    Tablet 40 milliGRAM(s) Oral before breakfast  polyethylene glycol 3350 17 Gram(s) Oral daily  senna 2 Tablet(s) Oral at bedtime  sodium chloride 0.9%. 1000 milliLiter(s) IV Continuous <Continuous>    MEDICATIONS  (PRN):  HYDROmorphone  Injectable 0.5 milliGRAM(s) IV Push every 6 hours PRN Breakthrough Pain  oxyCODONE    IR 10 milliGRAM(s) Oral every 4 hours PRN Severe Pain (7 - 10)  oxyCODONE    IR 5 milliGRAM(s) Oral every 4 hours PRN Moderate Pain (4 - 6)    ___________  Active diet:  Diet, Regular:   Consistent Carbohydrate No Snacks (CSTCHO)  Diet, Clear Liquid:   Consistent Carbohydrate No Snacks (CSTCHO)  ___________________    ==================>> VITAL SIGNS <<==================  Height (cm): 162.6  Weight (kg): 79  BMI (kg/m2): 29.9  Vital Signs Last 24 HrsT(C): 37.4 (07-20-23 @ 12:00)  T(F): 99.3 (07-20-23 @ 12:00), Max: 100.8 (07-20-23 @ 00:00)  HR: 94 (07-20-23 @ 13:00) (83 - 104)  BP: 134/66 (07-20-23 @ 13:00)  RR: 11 (07-20-23 @ 13:00) (1 - 21)  SpO2: 94% (07-20-23 @ 13:00) (89% - 100%)      CAPILLARY BLOOD GLUCOSE    POCT Blood Glucose.: 186 mg/dL (20 Jul 2023 15:04)  POCT Blood Glucose.: 192 mg/dL (20 Jul 2023 14:20)  POCT Blood Glucose.: 229 mg/dL (20 Jul 2023 13:13)  POCT Blood Glucose.: 231 mg/dL (20 Jul 2023 11:37)  POCT Blood Glucose.: 156 mg/dL (20 Jul 2023 07:53)  POCT Blood Glucose.: 144 mg/dL (20 Jul 2023 06:53)  POCT Blood Glucose.: 125 mg/dL (20 Jul 2023 04:01)  POCT Blood Glucose.: 98 mg/dL (20 Jul 2023 02:52)  POCT Blood Glucose.: 101 mg/dL (20 Jul 2023 02:07)  POCT Blood Glucose.: 102 mg/dL (20 Jul 2023 01:05)  POCT Blood Glucose.: 108 mg/dL (19 Jul 2023 23:02)  POCT Blood Glucose.: 109 mg/dL (19 Jul 2023 22:06)  POCT Blood Glucose.: 115 mg/dL (19 Jul 2023 20:56)  POCT Blood Glucose.: 117 mg/dL (19 Jul 2023 19:58)  POCT Blood Glucose.: 137 mg/dL (19 Jul 2023 19:02)  POCT Blood Glucose.: 145 mg/dL (19 Jul 2023 18:18)  POCT Blood Glucose.: 144 mg/dL (19 Jul 2023 16:59)  POCT Blood Glucose.: 160 mg/dL (19 Jul 2023 16:07)     ==================>> LAB AND IMAGING <<==================                        9.0    6.45  )-----------( 159      ( 20 Jul 2023 00:33 )             28.0        07-20    146<H>  |  114<H>  |  19  ----------------------------<  107<H>  4.5   |  21<L>  |  1.18    Ca    8.3<L>      20 Jul 2023 00:33  Phos  2.9     07-20  Mg     2.6     07-20    TPro  5.3<L>  /  Alb  3.5  /  TBili  1.1  /  DBili  x   /  AST  48<H>  /  ALT  27  /  AlkPhos  51  07-20    WBC count:   6.45 <<== ,  7.31 <<== ,  6.48 <<== ,  6.58 <<== ,  5.34 <<==   Hemoglobin:   9.0 <<==,  10.1 <<==,  14.5 <<==,  14.6 <<==,  14.1 <<==  platelets:  159 <==, 155 <==, 182 <==, 190 <==, 169 <==, 177 <==    Creatinine:  1.18  <<==, 0.96  <<==, 1.07  <<==, 1.07  <<==, 1.02  <<==, 1.04  <<==  Sodium:   146  <==, 147  <==, 137  <==, 135  <==, 138  <==, 137  <==       AST:          48 <== , 59 <== , 27 <== , 33 <==      ALT:        27  <== , 31  <== , 42  <== , 23  <==      AP:        51  <=, 59  <=, 94  <=, 84  <=     Bili:        1.1  <=, 1.4  <=, 0.9  <=, 0.4  <=       from: VA Duplex Carotid, Bilat (07.18.23 @ 10:17) >  IMPRESSION: No significant hemodynamic stenosis of either carotid artery.  < end of copied text >    < from: TTE W or WO Ultrasound Enhancing Agent (07.18.23 @ 08:37) >  CONCLUSIONS:    1. Normal left ventricular cavity size. The left ventricular wall thickness is normal. The left ventricular systolic function is normal with an ejection fraction of 65 % by Black's method of disks. There are regional wall motion abnormalities No evidence of a thrombus in the left ventricle.   2. Mid inferolateral segment and mid anterolateral segment are abnormal.      Mid inferolateral segment and mid anterolateral segment are abnormal.   3. Normal atria.   4. Normal right ventricular cavity size, normal right ventricular wall thickness and normal right ventricular systolic function. The tricuspid annular plane systolic excursion (TAPSE) is 2.1 cm (normal >=1.7 cm).   5. No significant valvular disease.   6. No pericardial effusion seen.   7. No prior echocardiogram is available for comparison.  < end of copied text >    < from: Cardiac Catheterization (07.14.23 @ 09:51) >  Procedures:                 1.    Arterial Access - Right Radial   2.    Left Heart Cath   3.    Diagnostic Coronary Angiography   Primary ICD-10: I25.110 - Atherosclerotic heart disease of native  coronary artery with unstable angina pectoris   Secondary ICD-10   E11.59 - Type 2 diabetes mellitus with other circulatory complications  I10 - Essential (primary) hypertension     Diagnostic Conclusions:   Patient has severe triple vessel disease as described with progressive  symptoms of angin    Recommendations:   Revascularization with CABG. Transfer to [facility]   < end of copied text >    ___________________________________________________________________________________  ===============>>  A S S E S S M E N T   A N D   P L A N <<===============  ------------------------------------------------------------------------------------------    · Assessment	  67-year-old male with NIDDM2, HTN, HLD, asthma, CPAP on LUCRECIA, sent to the ED by Dr. Hart for work-up of EKG abnormalities.     Problem/Plan - 1:  ·  Problem: Chest pain / Angina in patient with severe CAD.    Patient status post CABG, in CTICU   Appreciated CT surgery, critical care, CT ICU  monitor on tele  Wean off pressers, o2, Insulin drip ...  med optimization per cardiologist  Management of chest tubes as per surgery team  pain management as needed  supportive care   patient / oob as able     Problem/Plan - 2:  ·  Problem: type 2 diabetes mellitus.   FS Q6H w/ISS.  monitor  FS closely asia- op  endocrine appreciated      Problem/Plan - 3:  ·  Problem: LUCRECIA on CPAP.   > would need CPAP Nightly     --------------------------------------------  Case discussed with patient, RN..   ___________________________  Will follow with you  thank you  NOREEN Wilkinson D.O.  Pager: [phone]

## 2023-07-20 NOTE — PHYSICAL THERAPY INITIAL EVALUATION ADULT - ADDITIONAL COMMENTS
Per Dtr (emergency contact) via phone pt lives on the 2nd flr of a 2 family house w/ 27 steps neg in all to enter. Pt lives w/ wife, son and dtr-in-law. PTA indep w/ all ADLs w/o an assist device.

## 2023-07-20 NOTE — PROGRESS NOTE ADULT - ASSESSMENT
Assessment  DMT2: 67y Male with DM T2 with hyperglycemia, A1C 7%, postop IV insulin, extubated and diet advanced.   Will need tight glycemic control for postop wound healing.   CAD: on medications, stable, monitored. s/p CABG  ?Hypothyroidism: Elevated TSH 9, Free thyroxine levels not available, pending, no hx of thyroid disease.   HTN: on antihypertensive medications, monitored, asymptomatic.  HLD: on high intensity statin      Discussed plan and management wit Dr Shaun Pelayo NP-TEAMS  Pete Dunn MD  Cell: [phone]  Office: [phone]               Assessment  DMT2: 67y Male with DM T2 with hyperglycemia, A1C 7%, postop IV insulin, extubated and  diet advanced.   Will need tight glycemic control for postop wound healing.   CAD: on medications, stable, monitored. s/p CABG  ?Hypothyroidism: Elevated TSH 9, Free thyroxine levels not available, pending, no hx of thyroid disease.   HTN: on antihypertensive medications, monitored, asymptomatic.  HLD: on high intensity statin      Discussed plan and management wit Dr Shaun Pelayo NP-TEAMS  Pete Dunn MD  Cell: [phone]  Office: [phone]

## 2023-07-20 NOTE — PROVIDER CONTACT NOTE (CHANGE IN STATUS NOTIFICATION) - RECOMMENDATIONS
RN recommend neurology consult and head ct? RN recommend neurology consult and head ct/code stroke activation

## 2023-07-20 NOTE — PROGRESS NOTE ADULT - SUBJECTIVE AND OBJECTIVE BOX
Chief complaint  Patient is a 67y old  Male who presents with a chief complaint of 3 VCAD (20 Jul 2023 08:18)         Labs and Fingersticks  CAPILLARY BLOOD GLUCOSE      POCT Blood Glucose.: 229 mg/dL (20 Jul 2023 13:13)  POCT Blood Glucose.: 231 mg/dL (20 Jul 2023 11:37)  POCT Blood Glucose.: 156 mg/dL (20 Jul 2023 07:53)  POCT Blood Glucose.: 144 mg/dL (20 Jul 2023 06:53)  POCT Blood Glucose.: 125 mg/dL (20 Jul 2023 04:01)  POCT Blood Glucose.: 98 mg/dL (20 Jul 2023 02:52)  POCT Blood Glucose.: 101 mg/dL (20 Jul 2023 02:07)  POCT Blood Glucose.: 102 mg/dL (20 Jul 2023 01:05)  POCT Blood Glucose.: 108 mg/dL (19 Jul 2023 23:02)  POCT Blood Glucose.: 109 mg/dL (19 Jul 2023 22:06)  POCT Blood Glucose.: 115 mg/dL (19 Jul 2023 20:56)  POCT Blood Glucose.: 117 mg/dL (19 Jul 2023 19:58)  POCT Blood Glucose.: 137 mg/dL (19 Jul 2023 19:02)  POCT Blood Glucose.: 145 mg/dL (19 Jul 2023 18:18)  POCT Blood Glucose.: 144 mg/dL (19 Jul 2023 16:59)  POCT Blood Glucose.: 160 mg/dL (19 Jul 2023 16:07)      Anion Gap: 11 (07-20 @ 00:33)  Anion Gap: 11 (07-19 @ 15:21)      Calcium: 8.3 *L* (07-20 @ 00:33)  Calcium: 8.3 *L* (07-19 @ 15:21)  Albumin: 3.5 (07-20 @ 00:33)  Albumin: 3.1 *L* (07-19 @ 15:21)    Alanine Aminotransferase (ALT/SGPT): 27 (07-20 @ 00:33)  Alanine Aminotransferase (ALT/SGPT): 31 (07-19 @ 15:21)  Alkaline Phosphatase: 51 (07-20 @ 00:33)  Alkaline Phosphatase: 59 (07-19 @ 15:21)  Aspartate Aminotransferase (AST/SGOT): 48 *H* (07-20 @ 00:33)  Aspartate Aminotransferase (AST/SGOT): 59 *H* (07-19 @ 15:21)        07-20    146<H>  |  114<H>  |  19  ----------------------------<  107<H>  4.5   |  21<L>  |  1.18    Ca    8.3<L>      20 Jul 2023 00:33  Phos  2.9     07-20  Mg     2.6     07-20    TPro  5.3<L>  /  Alb  3.5  /  TBili  1.1  /  DBili  x   /  AST  48<H>  /  ALT  27  /  AlkPhos  51  07-20                        9.0    6.45  )-----------( 159      ( 20 Jul 2023 00:33 )             28.0     Medications  MEDICATIONS  (STANDING):  acetaminophen     Tablet .. 650 milliGRAM(s) Oral every 6 hours  amLODIPine   Tablet 5 milliGRAM(s) Oral daily  ascorbic acid 500 milliGRAM(s) Oral two times a day  aspirin enteric coated 81 milliGRAM(s) Oral daily  aspirin Suppository 300 milliGRAM(s) Rectal once  atorvastatin 80 milliGRAM(s) Oral at bedtime  cefuroxime  IVPB 1500 milliGRAM(s) IV Intermittent every 8 hours  chlorhexidine 2% Cloths 1 Application(s) Topical daily  enoxaparin Injectable 40 milliGRAM(s) SubCutaneous every 24 hours  gabapentin 100 milliGRAM(s) Oral every 8 hours  hydrALAZINE Injectable 10 milliGRAM(s) IV Push once  insulin lispro (ADMELOG) corrective regimen sliding scale   SubCutaneous Before meals and at bedtime  insulin regular Infusion 3 Unit(s)/Hr (3 mL/Hr) IV Continuous <Continuous>  metoprolol tartrate 25 milliGRAM(s) Oral every 8 hours  mupirocin 2% Ointment 1 Application(s) Both Nostrils two times a day  niCARdipine Infusion 5 mG/Hr (25 mL/Hr) IV Continuous <Continuous>  pantoprazole    Tablet 40 milliGRAM(s) Oral before breakfast  polyethylene glycol 3350 17 Gram(s) Oral daily  senna 2 Tablet(s) Oral at bedtime  sodium bicarbonate  Injectable 50 milliEquivalent(s) IV Push once  sodium chloride 0.9%. 1000 milliLiter(s) (10 mL/Hr) IV Continuous <Continuous>      Physical Exam  General: Patient comfortable in bed   Vital Signs Last 12 Hrs  T(F): 99.3 (07-20-23 @ 12:00), Max: 99.9 (07-20-23 @ 08:00)  HR: 94 (07-20-23 @ 13:00) (83 - 101)  BP: 134/66 (07-20-23 @ 13:00) (125/58 - 152/76)  BP(mean): 93 (07-20-23 @ 13:00) (85 - 106)  RR: 11 (07-20-23 @ 13:00) (3 - 21)  SpO2: 94% (07-20-23 @ 13:00) (89% - 97%)    CVS: S1S2   Respiratory: No wheezing, no crepitations  GI: Abdomen soft, bowel sounds positive  Musculoskeletal:  moves all extremities  : Voiding       Chief complaint  Patient is a 67y old  Male who presents with a chief complaint of 3 VCAD (20 Jul 2023 08:18)         Labs and Fingersticks  CAPILLARY BLOOD GLUCOSE      POCT Blood Glucose.: 229 mg/dL (20 Jul 2023 13:13)  POCT Blood Glucose.: 231 mg/dL (20 Jul 2023 11:37)  POCT Blood Glucose.: 156 mg/dL (20 Jul 2023 07:53)  POCT Blood Glucose.: 144 mg/dL (20 Jul 2023 06:53)  POCT Blood Glucose.: 125 mg/dL (20 Jul 2023 04:01)  POCT Blood Glucose.: 98 mg/dL (20 Jul 2023 02:52)  POCT Blood Glucose.: 101 mg/dL (20 Jul 2023 02:07)  POCT Blood Glucose.: 102 mg/dL (20 Jul 2023 01:05)  POCT Blood Glucose.: 108 mg/dL (19 Jul 2023 23:02)  POCT Blood Glucose.: 109 mg/dL (19 Jul 2023 22:06)  POCT Blood Glucose.: 115 mg/dL (19 Jul 2023 20:56)  POCT Blood Glucose.: 117 mg/dL (19 Jul 2023 19:58)  POCT Blood Glucose.: 137 mg/dL (19 Jul 2023 19:02)  POCT Blood Glucose.: 145 mg/dL (19 Jul 2023 18:18)  POCT Blood Glucose.: 144 mg/dL (19 Jul 2023 16:59)  POCT Blood Glucose.: 160 mg/dL (19 Jul 2023 16:07)      Anion Gap: 11 (07-20 @ 00:33)  Anion Gap: 11 (07-19 @ 15:21)      Calcium: 8.3 *L* (07-20 @ 00:33)  Calcium: 8.3 *L* (07-19 @ 15:21)  Albumin: 3.5 (07-20 @ 00:33)  Albumin: 3.1 *L* (07-19 @ 15:21)    Alanine Aminotransferase (ALT/SGPT): 27 (07-20 @ 00:33)  Alanine Aminotransferase (ALT/SGPT): 31 (07-19 @ 15:21)  Alkaline Phosphatase: 51 (07-20 @ 00:33)  Alkaline Phosphatase: 59 (07-19 @ 15:21)  Aspartate Aminotransferase (AST/SGOT): 48 *H* (07-20 @ 00:33)  Aspartate Aminotransferase (AST/SGOT): 59 *H* (07-19 @ 15:21)        07-20    146<H>  |  114<H>  |  19  ----------------------------<  107<H>  4.5   |  21<L>  |  1.18    Ca    8.3<L>      20 Jul 2023 00:33  Phos  2.9     07-20  Mg     2.6     07-20    TPro  5.3<L>  /  Alb  3.5  /  TBili  1.1  /  DBili  x   /  AST  48<H>  /  ALT  27  /  AlkPhos  51  07-20                        9.0    6.45  )-----------( 159      ( 20 Jul 2023 00:33 )             28.0     Medications  MEDICATIONS  (STANDING):  acetaminophen     Tablet .. 650 milliGRAM(s) Oral every 6 hours  amLODIPine   Tablet 5 milliGRAM(s) Oral daily  ascorbic acid 500 milliGRAM(s) Oral two times a day  aspirin enteric coated 81 milliGRAM(s) Oral daily  aspirin Suppository 300 milliGRAM(s) Rectal once  atorvastatin 80 milliGRAM(s) Oral at bedtime  cefuroxime  IVPB 1500 milliGRAM(s) IV Intermittent every 8 hours  chlorhexidine 2% Cloths 1 Application(s) Topical daily  enoxaparin Injectable 40 milliGRAM(s) SubCutaneous every 24 hours  gabapentin 100 milliGRAM(s) Oral every 8 hours  hydrALAZINE Injectable 10 milliGRAM(s) IV Push once  insulin lispro (ADMELOG) corrective regimen sliding scale   SubCutaneous Before meals and at bedtime  insulin regular Infusion 3 Unit(s)/Hr (3 mL/Hr) IV Continuous <Continuous>  metoprolol tartrate 25 milliGRAM(s) Oral every 8 hours  mupirocin 2% Ointment 1 Application(s) Both Nostrils two times a day  niCARdipine Infusion 5 mG/Hr (25 mL/Hr) IV Continuous <Continuous>  pantoprazole    Tablet 40 milliGRAM(s) Oral before breakfast  polyethylene glycol 3350 17 Gram(s) Oral daily  senna 2 Tablet(s) Oral at bedtime  sodium bicarbonate  Injectable 50 milliEquivalent(s) IV Push once  sodium chloride 0.9%. 1000 milliLiter(s) (10 mL/Hr) IV Continuous <Continuous>      Physical Exam  General: Patient comfortable in bed   Vital Signs Last 12 Hrs  T(F): 99.3 (07-20-23 @ 12:00), Max: 99.9 (07-20-23 @ 08:00)  HR: 94 (07-20-23 @ 13:00) (83 - 101)  BP: 134/66 (07-20-23 @ 13:00) (125/58 - 152/76)  BP(mean): 93 (07-20-23 @ 13:00) (85 - 106)  RR: 11 (07-20-23 @ 13:00) (3 - 21)  SpO2: 94% (07-20-23 @ 13:00) (89% - 97%)    CVS: S1S2   Respiratory: No wheezing, no crepitations  GI: Abdomen soft, bowel sounds positive  Musculoskeletal:  moves all extremities  : Voiding

## 2023-07-20 NOTE — PROVIDER CONTACT NOTE (CHANGE IN STATUS NOTIFICATION) - SITUATION
Patient with change in neurological exam, A/0x1, unable to follow commands, no effort x4 extremities.

## 2023-07-20 NOTE — PROGRESS NOTE ADULT - PROBLEM SELECTOR PLAN 1
Will continue current insulin regimen for now. Postop IV insulin  Will continue monitoring FS, log, and glucose trends, will Follow up.  Patient counseled for compliance with consistent low carb diet and exercise as tolerated outpatient.

## 2023-07-20 NOTE — PROGRESS NOTE ADULT - SUBJECTIVE AND OBJECTIVE BOX
Patient seen and examined at the bedside.    Remained critically ill on continuous ICU monitoring.    OBJECTIVE:  Vital Signs Last 24 Hrs  T(C): 37.4 (20 Jul 2023 04:00), Max: 38.2 (20 Jul 2023 00:00)  T(F): 99.3 (20 Jul 2023 04:00), Max: 100.8 (20 Jul 2023 00:00)  HR: 84 (20 Jul 2023 07:00) (84 - 104)  BP: --  BP(mean): --  RR: 8 (20 Jul 2023 07:00) (1 - 21)  SpO2: 92% (20 Jul 2023 07:00) (92% - 100%)    Parameters below as of 20 Jul 2023 04:00  Patient On (Oxygen Delivery Method): mask, aerosol    O2 Concentration (%): 40      Physical Exam:   General: Normal body habitus, intubated, breathing in sync with the ventilator  Neurology: Somnolent and responds only to loud stimuli  Eyes: bilateral pupils equal and reactive   ENT/Neck: Neck supple, trachea midline, No JVD   Respiratory: Clear bilaterally   CV: S1S2, no murmurs        [x] Sternal dressing, [x] Mediastinal CT x2, [x] Pleural CT x2        [x] Paced Rhythm - TPM AAI 92  Abdominal: Soft, NT, ND +BS  Extremities: trace pedal edema noted, + peripheral pulses   Skin: No Rashes, Hematoma, Ecchymosis                             Assessment:  68 y/o M w PMHx of NIDDM2, HTN, HLD, asthma, CPAP on LUCRECIA, sent to the ED by Dr. Hart for work-up of EKG abnormalities, now CAD s/p CABG 07/19/23    Hemodynamic instability  Hypovolemia  Post op respiratory insufficiency  Acute blood loss anemia  Diabetes Mellitus     Plan:   ***Neuro***  [x] Nonfocal  Post operative neuro assessment   Tylenol, Gabapentin, PRN Oxycodone, and PRN Dilaudid for pain management.     ***Cardiovascular***  Invasive hemodynamic monitoring, assess perfusion indices   MT / CVP 4 / MAP 84 / Hct 28.0% / Lactate 1.4  Continuous reassessment of hemodynamics   Cardene for BP management   Continue beta blocker  Monitor chest tube outputs   [x] VTE ppx with Lovenox  [x]  ASA [x] Statin   Serial EKG and cardiac enzymes     ***Pulmonary***  [x] Aerosol Mask 40%  Post op vent management   Titration of FiO2 and PEEP, follow SpO2, CXR, blood gasses   Encourage incentive spirometry, continue pulse ox monitoring, follow ABGs     Mode: CPAP with PS  FiO2: 40  PEEP: 5  PS: 5  MAP: 7  PIP: 11              ***GI***  [x]  Diet:  Regular  [x] Protonix   Bowel regimen with Miralax and Senna    ***Renal***  Continue to monitor I/Os, BUN/Creatinine.   Replete lytes PRN    ***ID***  Cefuroxime for perioperative antibiotic coverage    ***Endocrine***  [x] DM2 : HbA1c 7.0%                - [x] Insulin gtt  [x]  ISS             - Need tight glycemic control to prevent wound infection.          Patient requires continuous monitoring with bedside rhythm monitoring, pulse oximetry monitoring, and continuous central venous and arterial pressure monitoring; and intermittent blood gas analysis. Care plan discussed with the ICU care team.   Patient remained critical, at risk for life threatening decompensation.    I have spent 30 minutes providing critical care management to this patient.    By signing my name below, I, Toshia Thomas, attest that this documentation has been prepared under the direction and in the presence of SALOME Colindres   Electronically signed: Val Farris, 07-20-23 @ 08:18    I, SALOME Colindres, personally performed the services described in this documentation. all medical record entries made by the scribe were at my direction and in my presence. I have reviewed the chart and agree that the record reflects my personal performance and is accurate and complete  Electronically signed: SALOME Colindres  Patient seen and examined at the bedside.    Remained critically ill on continuous ICU monitoring.    OBJECTIVE:  Vital Signs Last 24 Hrs  T(C): 37.4 (20 Jul 2023 04:00), Max: 38.2 (20 Jul 2023 00:00)  T(F): 99.3 (20 Jul 2023 04:00), Max: 100.8 (20 Jul 2023 00:00)  HR: 84 (20 Jul 2023 07:00) (84 - 104)  BP: --  BP(mean): --  RR: 8 (20 Jul 2023 07:00) (1 - 21)  SpO2: 92% (20 Jul 2023 07:00) (92% - 100%)    Parameters below as of 20 Jul 2023 04:00  Patient On (Oxygen Delivery Method): mask, aerosol    O2 Concentration (%): 40      Physical Exam:   General: Normal body habitus, intubated, breathing in sync with the ventilator  Neurology: Somnolent but arousable. Slow to respond to questions with some word finding difficulty, but non-focal--will move all extremities with strong equal strength   Eyes: bilateral pupils equal and reactive   ENT/Neck: Neck supple, trachea midline, No JVD   Respiratory: Dec in the bases   CV: S1S2, no murmurs        [x] Sternal dressing, [x] Mediastinal CT x2, [x] Pleural CT x2        [x] A/V wires on VVI backup   Abdominal: Soft, NT, ND +BS  Extremities: trace pedal edema noted, + peripheral pulses   Skin: No Rashes, Hematoma, Ecchymosis                             Assessment:  66 y/o M w PMHx of NIDDM2, HTN, HLD, asthma, CPAP on LUCRECIA, sent to the ED by Dr. Hart for work-up of EKG abnormalities, now CAD s/p CABG 07/19/23    Hemodynamic instability  Hypovolemia  Post op respiratory insufficiency  Acute blood loss anemia  Diabetes Mellitus     Plan:   ***Neuro***  [x] Nonfocal  Post operative neuro assessment--patient with increasing word finding difficulty throughout the day, but non focal neuro exam. Holding narcotics, keeping BP higher   Tylenol, Gabapentin, PRN Oxycodone, and PRN Dilaudid for pain management.     ***Cardiovascular***  Invasive hemodynamic monitoring, assess perfusion indices   IA / CVP 4 / MAP 84 / Hct 28.0% / Lactate 1.4  Continuous reassessment of hemodynamics   Continue beta blocker  Allowing midly hypertensive pressures for inc brain perfusion   Monitor chest tube outputs   [x] VTE ppx with Lovenox  [x]  ASA [x] Statin   Serial EKG     ***Pulmonary***  [x] Nasal cannula 6L   Post op vent management   Titration of FiO2 and PEEP, follow SpO2, CXR, blood gasses   Encourage incentive spirometry, continue pulse ox monitoring, follow ABGs     ***GI***  [x]  Diet:  Regular  [x] Protonix   Bowel regimen with Miralax and Senna    ***Renal***  Continue to monitor I/Os, BUN/Creatinine.   Replete lytes PRN    ***ID***  Cefuroxime for perioperative antibiotic coverage    ***Endocrine***  [x] DM2 : HbA1c 7.0%                - [x] Insulin gtt  [x]  ISS             - Need tight glycemic control to prevent wound infection.          Patient requires continuous monitoring with bedside rhythm monitoring, pulse oximetry monitoring, and continuous central venous and arterial pressure monitoring; and intermittent blood gas analysis. Care plan discussed with the ICU care team.   Patient remained critical, at risk for life threatening decompensation.    I have spent 45 minutes providing critical care management to this patient.    By signing my name below, I, Toshia Thomas, attest that this documentation has been prepared under the direction and in the presence of SALOME Colindres   Electronically signed: Val Farris, 07-20-23 @ 08:18    I, SALOME Colindres, personally performed the services described in this documentation. all medical record entries made by the rennyibe were at my direction and in my presence. I have reviewed the chart and agree that the record reflects my personal performance and is accurate and complete  Electronically signed: SALOME Colindres

## 2023-07-20 NOTE — PHYSICAL THERAPY INITIAL EVALUATION ADULT - PLANNED THERAPY INTERVENTIONS, PT EVAL
1. LTG Pt will be indep to neg 27 step using HR w/in 4 wks/bed mobility training/gait training/transfer training

## 2023-07-20 NOTE — PHYSICAL THERAPY INITIAL EVALUATION ADULT - PERTINENT HX OF CURRENT PROBLEM, REHAB EVAL
67-year-old male with NIDDM2, HTN, HLD, asthma, CPAP on LUCRECIA, sent to the ED by Dr. Hart for work-up of EKG abnormalities. pain is described as a pressure over left and right anterior chest wall, radiating to R shoulder, 10/10 in severity, lasting 15-30 minutes at a time, worse with exertion. +Card Cath for MVD. Pt now s/p C5LR (7/19).

## 2023-07-21 LAB
ALBUMIN SERPL ELPH-MCNC: 3.4 G/DL — SIGNIFICANT CHANGE UP (ref 3.3–5)
ALP SERPL-CCNC: 55 U/L — SIGNIFICANT CHANGE UP (ref 40–120)
ALT FLD-CCNC: 28 U/L — SIGNIFICANT CHANGE UP (ref 10–45)
ANION GAP SERPL CALC-SCNC: 10 MMOL/L — SIGNIFICANT CHANGE UP (ref 5–17)
AST SERPL-CCNC: 37 U/L — SIGNIFICANT CHANGE UP (ref 10–40)
BASOPHILS # BLD AUTO: 0.02 K/UL — SIGNIFICANT CHANGE UP (ref 0–0.2)
BASOPHILS NFR BLD AUTO: 0.2 % — SIGNIFICANT CHANGE UP (ref 0–2)
BILIRUB SERPL-MCNC: 0.7 MG/DL — SIGNIFICANT CHANGE UP (ref 0.2–1.2)
BUN SERPL-MCNC: 22 MG/DL — SIGNIFICANT CHANGE UP (ref 7–23)
CALCIUM SERPL-MCNC: 8.8 MG/DL — SIGNIFICANT CHANGE UP (ref 8.4–10.5)
CHLORIDE SERPL-SCNC: 110 MMOL/L — HIGH (ref 96–108)
CO2 SERPL-SCNC: 23 MMOL/L — SIGNIFICANT CHANGE UP (ref 22–31)
CREAT SERPL-MCNC: 0.97 MG/DL — SIGNIFICANT CHANGE UP (ref 0.5–1.3)
EGFR: 87 ML/MIN/1.73M2 — SIGNIFICANT CHANGE UP
EOSINOPHIL # BLD AUTO: 0 K/UL — SIGNIFICANT CHANGE UP (ref 0–0.5)
EOSINOPHIL NFR BLD AUTO: 0 % — SIGNIFICANT CHANGE UP (ref 0–6)
GAS PNL BLDA: SIGNIFICANT CHANGE UP
GAS PNL BLDA: SIGNIFICANT CHANGE UP
GLUCOSE BLDC GLUCOMTR-MCNC: 103 MG/DL — HIGH (ref 70–99)
GLUCOSE BLDC GLUCOMTR-MCNC: 110 MG/DL — HIGH (ref 70–99)
GLUCOSE BLDC GLUCOMTR-MCNC: 113 MG/DL — HIGH (ref 70–99)
GLUCOSE BLDC GLUCOMTR-MCNC: 118 MG/DL — HIGH (ref 70–99)
GLUCOSE BLDC GLUCOMTR-MCNC: 121 MG/DL — HIGH (ref 70–99)
GLUCOSE BLDC GLUCOMTR-MCNC: 122 MG/DL — HIGH (ref 70–99)
GLUCOSE BLDC GLUCOMTR-MCNC: 134 MG/DL — HIGH (ref 70–99)
GLUCOSE BLDC GLUCOMTR-MCNC: 146 MG/DL — HIGH (ref 70–99)
GLUCOSE BLDC GLUCOMTR-MCNC: 149 MG/DL — HIGH (ref 70–99)
GLUCOSE BLDC GLUCOMTR-MCNC: 151 MG/DL — HIGH (ref 70–99)
GLUCOSE BLDC GLUCOMTR-MCNC: 158 MG/DL — HIGH (ref 70–99)
GLUCOSE BLDC GLUCOMTR-MCNC: 165 MG/DL — HIGH (ref 70–99)
GLUCOSE BLDC GLUCOMTR-MCNC: 165 MG/DL — HIGH (ref 70–99)
GLUCOSE BLDC GLUCOMTR-MCNC: 167 MG/DL — HIGH (ref 70–99)
GLUCOSE BLDC GLUCOMTR-MCNC: 176 MG/DL — HIGH (ref 70–99)
GLUCOSE BLDC GLUCOMTR-MCNC: 193 MG/DL — HIGH (ref 70–99)
GLUCOSE BLDC GLUCOMTR-MCNC: 194 MG/DL — HIGH (ref 70–99)
GLUCOSE BLDC GLUCOMTR-MCNC: 90 MG/DL — SIGNIFICANT CHANGE UP (ref 70–99)
GLUCOSE BLDC GLUCOMTR-MCNC: 90 MG/DL — SIGNIFICANT CHANGE UP (ref 70–99)
GLUCOSE SERPL-MCNC: 119 MG/DL — HIGH (ref 70–99)
HCT VFR BLD CALC: 29.4 % — LOW (ref 39–50)
HGB BLD-MCNC: 9.5 G/DL — LOW (ref 13–17)
IMM GRANULOCYTES NFR BLD AUTO: 0.7 % — SIGNIFICANT CHANGE UP (ref 0–0.9)
LYMPHOCYTES # BLD AUTO: 1.18 K/UL — SIGNIFICANT CHANGE UP (ref 1–3.3)
LYMPHOCYTES # BLD AUTO: 11.9 % — LOW (ref 13–44)
MAGNESIUM SERPL-MCNC: 2.3 MG/DL — SIGNIFICANT CHANGE UP (ref 1.6–2.6)
MCHC RBC-ENTMCNC: 27.1 PG — SIGNIFICANT CHANGE UP (ref 27–34)
MCHC RBC-ENTMCNC: 32.3 GM/DL — SIGNIFICANT CHANGE UP (ref 32–36)
MCV RBC AUTO: 84 FL — SIGNIFICANT CHANGE UP (ref 80–100)
MONOCYTES # BLD AUTO: 0.9 K/UL — SIGNIFICANT CHANGE UP (ref 0–0.9)
MONOCYTES NFR BLD AUTO: 9.1 % — SIGNIFICANT CHANGE UP (ref 2–14)
NEUTROPHILS # BLD AUTO: 7.71 K/UL — HIGH (ref 1.8–7.4)
NEUTROPHILS NFR BLD AUTO: 78.1 % — HIGH (ref 43–77)
NRBC # BLD: 0 /100 WBCS — SIGNIFICANT CHANGE UP (ref 0–0)
PHOSPHATE SERPL-MCNC: 1.8 MG/DL — LOW (ref 2.5–4.5)
PLATELET # BLD AUTO: 159 K/UL — SIGNIFICANT CHANGE UP (ref 150–400)
POTASSIUM SERPL-MCNC: 3.8 MMOL/L — SIGNIFICANT CHANGE UP (ref 3.5–5.3)
POTASSIUM SERPL-SCNC: 3.8 MMOL/L — SIGNIFICANT CHANGE UP (ref 3.5–5.3)
PROT SERPL-MCNC: 6 G/DL — SIGNIFICANT CHANGE UP (ref 6–8.3)
RBC # BLD: 3.5 M/UL — LOW (ref 4.2–5.8)
RBC # FLD: 15.9 % — HIGH (ref 10.3–14.5)
SODIUM SERPL-SCNC: 143 MMOL/L — SIGNIFICANT CHANGE UP (ref 135–145)
WBC # BLD: 9.88 K/UL — SIGNIFICANT CHANGE UP (ref 3.8–10.5)
WBC # FLD AUTO: 9.88 K/UL — SIGNIFICANT CHANGE UP (ref 3.8–10.5)

## 2023-07-21 PROCEDURE — 71045 X-RAY EXAM CHEST 1 VIEW: CPT | Mod: 26

## 2023-07-21 PROCEDURE — 99291 CRITICAL CARE FIRST HOUR: CPT

## 2023-07-21 RX ORDER — POTASSIUM PHOSPHATE, MONOBASIC POTASSIUM PHOSPHATE, DIBASIC 236; 224 MG/ML; MG/ML
30 INJECTION, SOLUTION INTRAVENOUS ONCE
Refills: 0 | Status: COMPLETED | OUTPATIENT
Start: 2023-07-21 | End: 2023-07-21

## 2023-07-21 RX ORDER — POTASSIUM CHLORIDE 20 MEQ
10 PACKET (EA) ORAL ONCE
Refills: 0 | Status: COMPLETED | OUTPATIENT
Start: 2023-07-21 | End: 2023-07-21

## 2023-07-21 RX ORDER — HYDRALAZINE HCL 50 MG
5 TABLET ORAL ONCE
Refills: 0 | Status: COMPLETED | OUTPATIENT
Start: 2023-07-21 | End: 2023-07-21

## 2023-07-21 RX ORDER — OXYCODONE HYDROCHLORIDE 5 MG/1
5 TABLET ORAL EVERY 4 HOURS
Refills: 0 | Status: DISCONTINUED | OUTPATIENT
Start: 2023-07-21 | End: 2023-07-25

## 2023-07-21 RX ORDER — OXYCODONE HYDROCHLORIDE 5 MG/1
10 TABLET ORAL EVERY 4 HOURS
Refills: 0 | Status: DISCONTINUED | OUTPATIENT
Start: 2023-07-21 | End: 2023-07-25

## 2023-07-21 RX ORDER — FUROSEMIDE 40 MG
20 TABLET ORAL DAILY
Refills: 0 | Status: COMPLETED | OUTPATIENT
Start: 2023-07-21 | End: 2023-07-24

## 2023-07-21 RX ADMIN — PANTOPRAZOLE SODIUM 40 MILLIGRAM(S): 20 TABLET, DELAYED RELEASE ORAL at 06:41

## 2023-07-21 RX ADMIN — OXYCODONE HYDROCHLORIDE 5 MILLIGRAM(S): 5 TABLET ORAL at 22:19

## 2023-07-21 RX ADMIN — Medication 650 MILLIGRAM(S): at 17:30

## 2023-07-21 RX ADMIN — GABAPENTIN 100 MILLIGRAM(S): 400 CAPSULE ORAL at 05:33

## 2023-07-21 RX ADMIN — Medication 20 MILLIGRAM(S): at 10:57

## 2023-07-21 RX ADMIN — Medication 650 MILLIGRAM(S): at 01:43

## 2023-07-21 RX ADMIN — Medication 50 MILLIEQUIVALENT(S): at 02:01

## 2023-07-21 RX ADMIN — Medication 650 MILLIGRAM(S): at 11:30

## 2023-07-21 RX ADMIN — ATORVASTATIN CALCIUM 80 MILLIGRAM(S): 80 TABLET, FILM COATED ORAL at 21:17

## 2023-07-21 RX ADMIN — OXYCODONE HYDROCHLORIDE 5 MILLIGRAM(S): 5 TABLET ORAL at 22:59

## 2023-07-21 RX ADMIN — CHLORHEXIDINE GLUCONATE 1 APPLICATION(S): 213 SOLUTION TOPICAL at 11:07

## 2023-07-21 RX ADMIN — Medication 100 MILLIGRAM(S): at 04:04

## 2023-07-21 RX ADMIN — Medication 650 MILLIGRAM(S): at 06:30

## 2023-07-21 RX ADMIN — OXYCODONE HYDROCHLORIDE 5 MILLIGRAM(S): 5 TABLET ORAL at 05:00

## 2023-07-21 RX ADMIN — Medication 650 MILLIGRAM(S): at 11:00

## 2023-07-21 RX ADMIN — SENNA PLUS 2 TABLET(S): 8.6 TABLET ORAL at 21:17

## 2023-07-21 RX ADMIN — Medication 650 MILLIGRAM(S): at 00:54

## 2023-07-21 RX ADMIN — Medication 650 MILLIGRAM(S): at 05:33

## 2023-07-21 RX ADMIN — Medication 25 MILLIGRAM(S): at 21:16

## 2023-07-21 RX ADMIN — Medication 25 MILLIGRAM(S): at 05:34

## 2023-07-21 RX ADMIN — Medication 25 MILLIGRAM(S): at 14:47

## 2023-07-21 RX ADMIN — MUPIROCIN 1 APPLICATION(S): 20 OINTMENT TOPICAL at 05:34

## 2023-07-21 RX ADMIN — AMLODIPINE BESYLATE 5 MILLIGRAM(S): 2.5 TABLET ORAL at 05:36

## 2023-07-21 RX ADMIN — Medication 650 MILLIGRAM(S): at 17:00

## 2023-07-21 RX ADMIN — Medication 81 MILLIGRAM(S): at 11:00

## 2023-07-21 RX ADMIN — MUPIROCIN 1 APPLICATION(S): 20 OINTMENT TOPICAL at 17:44

## 2023-07-21 RX ADMIN — POLYETHYLENE GLYCOL 3350 17 GRAM(S): 17 POWDER, FOR SOLUTION ORAL at 11:01

## 2023-07-21 RX ADMIN — POTASSIUM PHOSPHATE, MONOBASIC POTASSIUM PHOSPHATE, DIBASIC 83.33 MILLIMOLE(S): 236; 224 INJECTION, SOLUTION INTRAVENOUS at 02:00

## 2023-07-21 RX ADMIN — OXYCODONE HYDROCHLORIDE 5 MILLIGRAM(S): 5 TABLET ORAL at 04:24

## 2023-07-21 RX ADMIN — Medication 50 MILLIEQUIVALENT(S): at 00:55

## 2023-07-21 RX ADMIN — Medication 5 MILLIGRAM(S): at 21:00

## 2023-07-21 RX ADMIN — ENOXAPARIN SODIUM 40 MILLIGRAM(S): 100 INJECTION SUBCUTANEOUS at 05:34

## 2023-07-21 RX ADMIN — Medication 500 MILLIGRAM(S): at 17:45

## 2023-07-21 RX ADMIN — INSULIN HUMAN 3 UNIT(S)/HR: 100 INJECTION, SOLUTION SUBCUTANEOUS at 10:58

## 2023-07-21 RX ADMIN — Medication 500 MILLIGRAM(S): at 05:33

## 2023-07-21 RX ADMIN — Medication 50 MILLIEQUIVALENT(S): at 01:37

## 2023-07-21 NOTE — PROGRESS NOTE ADULT - SUBJECTIVE AND OBJECTIVE BOX
Patient seen and examined at the bedside.    Remained critically ill on continuous ICU monitoring.    OBJECTIVE:  Vital Signs Last 24 Hrs  T(C): 37.8 (21 Jul 2023 04:00), Max: 37.9 (21 Jul 2023 00:00)  T(F): 100 (21 Jul 2023 04:00), Max: 100.2 (21 Jul 2023 00:00)  HR: 90 (21 Jul 2023 07:00) (83 - 98)  BP: 153/79 (21 Jul 2023 00:00) (125/58 - 156/80)  BP(mean): 109 (21 Jul 2023 00:00) (85 - 110)  RR: 12 (21 Jul 2023 07:00) (9 - 31)  SpO2: 96% (21 Jul 2023 07:00) (89% - 97%)    Parameters below as of 21 Jul 2023 04:00  Patient On (Oxygen Delivery Method): nasal cannula  O2 Flow (L/min): 6        Physical Exam:   General: Normal body habitus   Neurology: Somnolent but arousable. Slow to respond to questions with some word finding difficulty, but non-focal--will move all extremities with strong equal strength   Eyes: bilateral pupils equal and reactive   ENT/Neck: Neck supple, trachea midline, No JVD   Respiratory: Dec in the bases   CV: S1S2, no murmurs        [x] Sternal dressing, [x] Mediastinal CT x2, [x] Pleural CT x2        [x] A/V wires on VVI backup   Abdominal: Soft, NT, ND +BS  Extremities: trace pedal edema noted, + peripheral pulses   Skin: No Rashes, Hematoma, Ecchymosis                             Assessment:  68 y/o M w PMHx of NIDDM2, HTN, HLD, asthma, CPAP on LUCRECIA, sent to the ED by Dr. Hart for work-up of EKG abnormalities, now CAD s/p CABG 07/19/23    Hemodynamic instability  Hypovolemia  Post op respiratory insufficiency  Acute blood loss anemia  Diabetes Mellitus     Plan:   ***Neuro***  [x] Nonfocal  Post operative neuro assessment--patient with increasing word finding difficulty throughout the day, but non focal neuro exam. Holding narcotics, keeping BP higher   Tylenol, Gabapentin, and PRN Oxycodone  for pain management.     ***Cardiovascular***  Invasive hemodynamic monitoring, assess perfusion indices   WA / CVP -3 / MAP 90 / Hct 29.4% / Lactate 1.6  Continuous reassessment of hemodynamics   Beta blocker and Norvasc for BP Management   Allowing midly hypertensive pressures for inc brain perfusion   Monitor chest tube outputs   [x] VTE ppx with Lovenox  [x]  ASA [x] Statin   Serial EKG     ***Pulmonary***  [x] NC 6L   Encourage incentive spirometry, continue pulse ox monitoring, follow ABGs     ***GI***  [x]  Diet:  Regular  [x] Protonix   Bowel regimen with Miralax and Senna    ***Renal***  Continue to monitor I/Os, BUN/Creatinine.   Replete lytes PRN  Huertas present     ***ID***  No active antibiotic coverage     ***Endocrine***  [x] DM2 : HbA1c 7.0%                - [x] Insulin gtt               - Need tight glycemic control to prevent wound infection.      Patient requires continuous monitoring with bedside rhythm monitoring, pulse oximetry monitoring, and continuous central venous and arterial pressure monitoring; and intermittent blood gas analysis. Care plan discussed with the ICU care team.   Patient remained critical, at risk for life threatening decompensation.    I have spent 30 minutes providing critical care management to this patient.    By signing my name below, I, Luis Cordova, attest that this documentation has been prepared under the direction and in the presence of Romario Tom NP   Electronically signed: Jesus Gardiner, 07-21-23 @ 07:24    IVaishali Mirabile NP, personally performed the services described in this documentation. all medical record entries made by the jesus were at my direction and in my presence. I have reviewed the chart and agree that the record reflects my personal performance and is accurate and complete  Electronically signed: Romario Tom NP  Patient seen and examined at the bedside.    Remained critically ill on continuous ICU monitoring.    OBJECTIVE:  Vital Signs Last 24 Hrs  T(C): 37.8 (21 Jul 2023 04:00), Max: 37.9 (21 Jul 2023 00:00)  T(F): 100 (21 Jul 2023 04:00), Max: 100.2 (21 Jul 2023 00:00)  HR: 90 (21 Jul 2023 07:00) (83 - 98)  BP: 153/79 (21 Jul 2023 00:00) (125/58 - 156/80)  BP(mean): 109 (21 Jul 2023 00:00) (85 - 110)  RR: 12 (21 Jul 2023 07:00) (9 - 31)  SpO2: 96% (21 Jul 2023 07:00) (89% - 97%)    Parameters below as of 21 Jul 2023 04:00  Patient On (Oxygen Delivery Method): nasal cannula  O2 Flow (L/min): 6        Physical Exam:   General: Normal body habitus   Neurology: Somnolent but arousable. Slow to respond to questions with some word finding difficulty, but non-focal--will move all extremities with strong equal strength   Eyes: bilateral pupils equal and reactive   ENT/Neck: Neck supple, trachea midline, No JVD   Respiratory: Dec in the bases   CV: S1S2, no murmurs        [x] Sternal dressing, [x] Mediastinal CT x2, [x] Pleural CT x2        [x] A/V wires on VVI backup   Abdominal: Soft, NT, ND +BS  Extremities: trace pedal edema noted, + peripheral pulses   Skin: No Rashes, Hematoma, Ecchymosis                             Assessment:  66 y/o M w PMHx of NIDDM2, HTN, HLD, asthma, CPAP on LUCRECIA, sent to the ED by Dr. Hart for work-up of EKG abnormalities, now CAD s/p CABG 07/19/23    Hemodynamic instability  Hypovolemia  Post op respiratory insufficiency  Acute blood loss anemia  Diabetes Mellitus     Plan:   ***Neuro***  [x] Nonfocal  Post operative neuro assessment--patient with increasing word finding difficulty throughout the day, but non focal neuro exam. Holding narcotics, keeping BP higher. F/u neuro status today  Tylenol, Gabapentin, and PRN Oxycodone  for pain management.     ***Cardiovascular***  Invasive hemodynamic monitoring, assess perfusion indices   MO / CVP -3 / MAP 90 / Hct 29.4% / Lactate 1.6  Continuous reassessment of hemodynamics   Beta blocker and Norvasc for BP Management   Allowing midly hypertensive pressures for inc brain perfusion   Monitor chest tube outputs   [x] VTE ppx with Lovenox  [x]  ASA [x] Statin   Serial EKG   Will remove chest tubes as tolerated    ***Pulmonary***  [x] NC 6L   Encourage incentive spirometry, continue pulse ox monitoring, follow ABGs     ***GI***  [x]  Diet:  Regular  [x] Protonix   Bowel regimen with Miralax and Senna    ***Renal***  Continue to monitor I/Os, BUN/Creatinine.   Replete lytes PRN  Huertas present   Starting on diuresis today     ***ID***  No active antibiotic coverage     ***Endocrine***  [x] DM2 : HbA1c 7.0%                - [x] Insulin gtt               - Need tight glycemic control to prevent wound infection.      Patient requires continuous monitoring with bedside rhythm monitoring, pulse oximetry monitoring, and continuous central venous and arterial pressure monitoring; and intermittent blood gas analysis. Care plan discussed with the ICU care team.   Patient remained critical, at risk for life threatening decompensation.    I have spent 30 minutes providing critical care management to this patient.    By signing my name below, I, Luis Cordova, attest that this documentation has been prepared under the direction and in the presence of Romario Tom NP   Electronically signed: Jesus Gardiner, 07-21-23 @ 07:24    I, Romario Tom NP, personally performed the services described in this documentation. all medical record entries made by the jesus were at my direction and in my presence. I have reviewed the chart and agree that the record reflects my personal performance and is accurate and complete  Electronically signed: Romario Tom NP  Patient seen and examined at the bedside.    Remained critically ill on continuous ICU monitoring.    OBJECTIVE:  Vital Signs Last 24 Hrs  T(C): 37.8 (21 Jul 2023 04:00), Max: 37.9 (21 Jul 2023 00:00)  T(F): 100 (21 Jul 2023 04:00), Max: 100.2 (21 Jul 2023 00:00)  HR: 90 (21 Jul 2023 07:00) (83 - 98)  BP: 153/79 (21 Jul 2023 00:00) (125/58 - 156/80)  BP(mean): 109 (21 Jul 2023 00:00) (85 - 110)  RR: 12 (21 Jul 2023 07:00) (9 - 31)  SpO2: 96% (21 Jul 2023 07:00) (89% - 97%)    Parameters below as of 21 Jul 2023 04:00  Patient On (Oxygen Delivery Method): nasal cannula  O2 Flow (L/min): 6        Physical Exam:   General: Normal body habitus   Neurology: lethargic but arousable. Slow to respond to questions with some word finding difficulty, but non-focal--will move all extremities with strong equal strength   Eyes: bilateral pupils equal and reactive   ENT/Neck: Neck supple, trachea midline, No JVD   Respiratory: Dec in the bases   CV: S1S2, no murmurs        [x] Sternal dressing, [x] Mediastinal CT x2, [x] Pleural CT x2        [x] A/V wires on VVI backup   Abdominal: Soft, NT, ND +BS  Extremities: trace pedal edema noted, + peripheral pulses   Skin: No Rashes, Hematoma, Ecchymosis                             Assessment:  68 y/o M w PMHx of NIDDM2, HTN, HLD, asthma, CPAP on LUCRECIA, sent to the ED by Dr. Hart for work-up of EKG abnormalities, now CAD s/p CABG 07/19/23    Hemodynamic instability  Hypovolemia  Post op respiratory insufficiency  Acute blood loss anemia  Diabetes Mellitus     Plan:   ***Neuro***  [x] Nonfocal  Post operative neuro assessment--patient with increasing word finding but non focal neuro exam this am-> improved this afternoon A&Ox3. Holding narcotics  keeping BP higher  Tylenol for pain management.   Neuro consulted, if change in status can get CTH noncon    ***Cardiovascular***  Invasive hemodynamic monitoring, assess perfusion indices   AL / CVP -3 / MAP 90 / Hct 29.4% / Lactate 1.6  Continuous reassessment of hemodynamics   Beta blocker and Norvasc for BP Management   Allowing mild hypertensive pressures for inc brain perfusion   Monitor chest tube outputs   [x] VTE ppx with Lovenox  [x]  ASA [x] Statin   Serial EKG   Will remove chest tubes as tolerated    ***Pulmonary***  [x] NC 6L, wean as tolerated  Encourage incentive spirometry, continue pulse ox monitoring, follow ABGs     ***GI***  [x]  Diet:  Regular  [x] Protonix   Bowel regimen with Miralax and Senna    ***Renal***  Continue to monitor I/Os, BUN/Creatinine.   Replete lytes PRN  Huertas present   Starting on diuresis today with 20 PO lasix    ***ID***  No active antibiotic coverage     ***Endocrine***  [x] DM2 : HbA1c 7.0%                - [x] Insulin gtt               - Need tight glycemic control to prevent wound infection.      Patient requires continuous monitoring with bedside rhythm monitoring, pulse oximetry monitoring, and continuous central venous and arterial pressure monitoring; and intermittent blood gas analysis. Care plan discussed with the ICU care team.   Patient remained critical, at risk for life threatening decompensation.    I have spent 55 minutes providing critical care management to this patient.    By signing my name below, I, Luis Cordova, attest that this documentation has been prepared under the direction and in the presence of Romario Tom NP   Electronically signed: Jesus Gardiner, 07-21-23 @ 07:24    I, Romario Tom NP, personally performed the services described in this documentation. all medical record entries made by the jesus were at my direction and in my presence. I have reviewed the chart and agree that the record reflects my personal performance and is accurate and complete  Electronically signed: Romario Tom NP

## 2023-07-21 NOTE — CONSULT NOTE ADULT - SUBJECTIVE AND OBJECTIVE BOX
Neurology Consult    Reason for Consult: Patient is a 67y old  Male who presents with a chief complaint of 3 VCAD (21 Jul 2023 07:23)      HPI:  67-year-old male with NIDDM2, HTN, HLD, asthma, CPAP on LUCRECIA, sent to the ED by Dr. Hart for work-up of EKG abnormalities. pain is described as a pressure over left and right anterior chest wall, radiating to R shoulder, 10/10 in severity, lasting 15-30 minutes at a time, worse with exertion, associated with diaphoresis and without any associated nausea, shortness of breath or palpitations. He denies any history of CAD, prior cath/echo or stress testing. He reports prior w/u at [facility] however unsure of results or testing performed  Cardiac cath done on 7/14/23 showed - C: pLAD 90%, D1 95%, mLCx 90%, mRCA 90%, LVEDP 15, Transferred to [facility] for CABg eval w/ Dr Hall   (17 Jul 2023 19:34)       PAST MEDICAL & SURGICAL HISTORY:  Hypertension      Hyperlipemia      Diabetes mellitus      Asthma      LUCRECIA on CPAP      Triple vessel disease of the heart      History of appendectomy          Allergies: Allergies    No Known Allergies    Intolerances        Social History: Denies toxic habits including tobacco, ETOH or illicit drugs.    Family History: FAMILY HISTORY:  No pertinent family history in first degree relatives    . No family history of strokes    Medications: MEDICATIONS  (STANDING):  acetaminophen     Tablet .. 650 milliGRAM(s) Oral every 6 hours  amLODIPine   Tablet 5 milliGRAM(s) Oral daily  ascorbic acid 500 milliGRAM(s) Oral two times a day  aspirin enteric coated 81 milliGRAM(s) Oral daily  atorvastatin 80 milliGRAM(s) Oral at bedtime  bisacodyl Suppository 10 milliGRAM(s) Rectal once  chlorhexidine 2% Cloths 1 Application(s) Topical daily  enoxaparin Injectable 40 milliGRAM(s) SubCutaneous every 24 hours  furosemide    Tablet 20 milliGRAM(s) Oral daily  insulin regular Infusion 3 Unit(s)/Hr (3 mL/Hr) IV Continuous <Continuous>  metoprolol tartrate 25 milliGRAM(s) Oral every 8 hours  mupirocin 2% Ointment 1 Application(s) Both Nostrils two times a day  pantoprazole    Tablet 40 milliGRAM(s) Oral before breakfast  polyethylene glycol 3350 17 Gram(s) Oral daily  senna 2 Tablet(s) Oral at bedtime  sodium chloride 0.9%. 1000 milliLiter(s) (10 mL/Hr) IV Continuous <Continuous>    MEDICATIONS  (PRN):      Review of Systems:  CONSTITUTIONAL:  No weight loss, fever, chills, weakness or fatigue.  HEENT:  Eyes:  No visual loss, blurred vision, double vision or yellow sclera. Ears, Nose, Throat:  No hearing loss, sneezing, congestion, runny nose or sore throat.  SKIN:  No rash or itching.  CARDIOVASCULAR:  chest pain   RESPIRATORY:  No shortness of breath, cough or sputum.  GASTROINTESTINAL:  No anorexia, nausea, vomiting or diarrhea. No abdominal pain or blood.  GENITOURINARY:  No burning on urination or incontinence   NEUROLOGICAL:  No headache, dizziness, syncope, paralysis, ataxia, numbness or tingling in the extremities. No change in bowel or bladder control. no limb weakness. no vision changes.   MUSCULOSKELETAL:  No muscle, back pain, joint pain or stiffness.  HEMATOLOGIC:  No anemia, bleeding or bruising.  LYMPHATICS:  No enlarged nodes. No history of splenectomy.  PSYCHIATRIC:  No history of depression or anxiety.  ENDOCRINOLOGIC:  No reports of sweating, cold or heat intolerance. No polyuria or polydipsia.      Vitals:  Vital Signs Last 24 Hrs  T(C): 37.6 (21 Jul 2023 08:00), Max: 37.9 (21 Jul 2023 00:00)  T(F): 99.7 (21 Jul 2023 08:00), Max: 100.2 (21 Jul 2023 00:00)  HR: 85 (21 Jul 2023 11:00) (84 - 98)  BP: 153/79 (21 Jul 2023 00:00) (125/58 - 156/80)  BP(mean): 109 (21 Jul 2023 00:00) (85 - 110)  RR: 16 (21 Jul 2023 11:00) (11 - 31)  SpO2: 94% (21 Jul 2023 11:00) (90% - 97%)    Parameters below as of 21 Jul 2023 10:04  Patient On (Oxygen Delivery Method): nasal cannula  O2 Flow (L/min): 6      General Exam:   General Appearance: Appropriately dressed and in no acute distress       Head: Normocephalic, atraumatic and no dysmorphic features  Ear, Nose, and Throat: Moist mucous membranes  CVS: S1S2+  Resp: No SOB, no wheeze or rhonchi  GI: soft NT/ND  Extremities: No edema or cyanosis  Skin: No bruises or rashes     Neurological Exam:  Mental Status: Awake, alert and oriented x 2.  Able to follow simple verbal commands. Able to name and repeat. fluent speech. No obvious aphasia minimal dysarthria noted.   Cranial Nerves: PERRL, EOMI, VFFC, sensation V1-V3 intact,  no obvious facial asymmetry, equal elevation of palate, scm/trap 5/5, tongue is midline on protrusion. no obvious papilledema on fundoscopic exam. hearing is grossly intact.   Motor: Normal bulk, tone and strength throughout. Fine finger movements were intact and symmetric. no tremors or drift noted.    Sensation: Intact to light touch and pinprick throughout. no right/left confusion. no extinction to tactile on DSS.    Reflexes: 1+ throughout at biceps, brachioradialis, triceps, patellars and ankles bilaterally and equal. No clonus. R toe and L toe were both downgoing.  Coordination: No dysmetria on FNF or HKS  Gait: walked on unit     Data/Labs/Imaging which I personally reviewed.     Labs:     CBC Full  -  ( 21 Jul 2023 01:03 )  WBC Count : 9.88 K/uL  RBC Count : 3.50 M/uL  Hemoglobin : 9.5 g/dL  Hematocrit : 29.4 %  Platelet Count - Automated : 159 K/uL  Mean Cell Volume : 84.0 fl  Mean Cell Hemoglobin : 27.1 pg  Mean Cell Hemoglobin Concentration : 32.3 gm/dL  Auto Neutrophil # : 7.71 K/uL  Auto Lymphocyte # : 1.18 K/uL  Auto Monocyte # : 0.90 K/uL  Auto Eosinophil # : 0.00 K/uL  Auto Basophil # : 0.02 K/uL  Auto Neutrophil % : 78.1 %  Auto Lymphocyte % : 11.9 %  Auto Monocyte % : 9.1 %  Auto Eosinophil % : 0.0 %  Auto Basophil % : 0.2 %    07-21    143  |  110<H>  |  22  ----------------------------<  119<H>  3.8   |  23  |  0.97    Ca    8.8      21 Jul 2023 01:03  Phos  1.8     07-21  Mg     2.3     07-21    TPro  6.0  /  Alb  3.4  /  TBili  0.7  /  DBili  x   /  AST  37  /  ALT  28  /  AlkPhos  55  07-21    LIVER FUNCTIONS - ( 21 Jul 2023 01:03 )  Alb: 3.4 g/dL / Pro: 6.0 g/dL / ALK PHOS: 55 U/L / ALT: 28 U/L / AST: 37 U/L / GGT: x           PT/INR - ( 20 Jul 2023 00:33 )   PT: 14.8 sec;   INR: 1.27 ratio         PTT - ( 20 Jul 2023 00:33 )  PTT:28.4 sec  Urinalysis Basic - ( 21 Jul 2023 01:03 )    Color: x / Appearance: x / SG: x / pH: x  Gluc: 119 mg/dL / Ketone: x  / Bili: x / Urobili: x   Blood: x / Protein: x / Nitrite: x   Leuk Esterase: x / RBC: x / WBC x   Sq Epi: x / Non Sq Epi: x / Bacteria: x    < from: [facility] Duplex Carotid, Bilat (07.18.23 @ 10:17) >    ACC: [id number] EXAM:  CAROTID DUPLEX BILATERAL   ORDERED BY: MANSI WILEY     PROCEDURE DATE:  07/18/2023          INTERPRETATION:  CLINICAL INFORMATION: Coronary artery disease, evaluate   for carotid artery disease    COMPARISON: None available.    TECHNIQUE: Grayscale, color and spectral Doppler examination of both   carotid arteries was performed.    FINDINGS:    There is increased tortuosity to the right and the left carotid arteries   in the neck.    Mild atheromatous plaque is present along the course of the carotid   arteries in the neck.    No elevated velocities or abnormal waveforms are encountered.    Peak systolic velocities are as follows:    RIGHT:  PROX CCA = 110  DIST CCA = 55  PROX ICA = 71  DIST ICA = 58  ECA = 115    LEFT:  PROX CCA = 95  DIST CCA = 62  PROX ICA = 79  DIST ICA = 69  ECA = 77    Antegrade flow is noted within both vertebral arteries.    IMPRESSION: No significant hemodynamic stenosis of either carotid artery.    Measurement of carotid stenosis is based on velocity parameters that   correlate the residual internal carotid diameter with that of the more   distal vessel in accordance with a method such as the [place]   Symptomatic Carotid Endarterectomy Trial (NASCET).    --- End of Report ---            ISAIAS CHIU MD; Attending Radiologist  This document has been electronically signed. Jul 18 2023 10:46AM    < end of copied text >

## 2023-07-21 NOTE — PROGRESS NOTE ADULT - ASSESSMENT
_________________________________________________________________________________________  ========>>  M E D I C A L   A T T E N D I N G    F O L L O W  U P  N O T E  <<=========  -----------------------------------------------------------------------------------------------------    - Patient seen and examined by me earlier today.  patient seen post op CABG, in CTICU  - In summary,  GILA SHAH is a 67y year old man admitted with CP / CAD   - Patient doing ok post op CABG X5! comfortable, extubated , in chair today, starting PO     ==================>> REVIEW OF SYSTEM <<=================    GEN: no fever, no chills, + some pain post op  RESP: no SOB, occasional cough, no sputum  CVS:  chest pain, no palpitations, no edema  GI: no abdominal pain, no nausea, no vomiting  : no dysuria, no issues with hernández   NEURO: no headache, no dizziness  PSYCH: no depression, not anxious  Derm : no itching, no rash    ==================>> PHYSICAL EXAM <<=================    GEN: Alert, Oriented, in chair, NAD , comfortable  HEENT: NCAT, lines and tubes in place   Neck: supple , lines and tubes in place   CVS: S1S2 , regular   PULM: CTA B/L,  limited ... chest tubes in place   ABD.: soft. non tender, non distended  Extrem: legs wrapped,+ hernández   neuro-Psych: alert, flat affect                             ( Note written / Date of service 07-21-23 )    ==================>> MEDICATIONS <<====================    acetaminophen     Tablet .. 650 milliGRAM(s) Oral every 6 hours  amLODIPine   Tablet 5 milliGRAM(s) Oral daily  ascorbic acid 500 milliGRAM(s) Oral two times a day  aspirin enteric coated 81 milliGRAM(s) Oral daily  atorvastatin 80 milliGRAM(s) Oral at bedtime  bisacodyl Suppository 10 milliGRAM(s) Rectal once  chlorhexidine 2% Cloths 1 Application(s) Topical daily  enoxaparin Injectable 40 milliGRAM(s) SubCutaneous every 24 hours  furosemide    Tablet 20 milliGRAM(s) Oral daily  insulin regular Infusion 3 Unit(s)/Hr IV Continuous <Continuous>  metoprolol tartrate 25 milliGRAM(s) Oral every 8 hours  mupirocin 2% Ointment 1 Application(s) Both Nostrils two times a day  pantoprazole    Tablet 40 milliGRAM(s) Oral before breakfast  polyethylene glycol 3350 17 Gram(s) Oral daily  senna 2 Tablet(s) Oral at bedtime  sodium chloride 0.9%. 1000 milliLiter(s) IV Continuous <Continuous>    MEDICATIONS  (PRN):    ___________  Active diet:  Diet, Regular:   Consistent Carbohydrate No Snacks (CSTCHO)  ___________________    ==================>> VITAL SIGNS <<==================  Height (cm): 162.6  Weight (kg): 79  BMI (kg/m2): 29.9  Vital Signs Last 24 HrsT(C): 37.7 (07-21-23 @ 12:00)  T(F): 99.9 (07-21-23 @ 12:00), Max: 100.2 (07-21-23 @ 00:00)  HR: 88 (07-21-23 @ 14:00) (84 - 98)  BP: 153/79 (07-21-23 @ 00:00)  RR: 3 (07-21-23 @ 14:00) (3 - 31)  SpO2: 96% (07-21-23 @ 14:00) (90% - 97%)      CAPILLARY BLOOD GLUCOSE  165 (21 Jul 2023 14:00)  175 (21 Jul 2023 13:00)  103 (21 Jul 2023 12:00)  118 (21 Jul 2023 11:00)  122 (21 Jul 2023 10:00)  194 (21 Jul 2023 09:00)  134 (21 Jul 2023 08:00)  151 (21 Jul 2023 07:00)  158 (21 Jul 2023 05:00)  137 (21 Jul 2023 04:00)  90 (21 Jul 2023 03:00)  110 (21 Jul 2023 02:00)  113 (21 Jul 2023 00:00)  129 (20 Jul 2023 23:00)  138 (20 Jul 2023 22:00)  187 (20 Jul 2023 21:00)  147 (20 Jul 2023 20:00)      POCT Blood Glucose.: 165 mg/dL (21 Jul 2023 13:56)  POCT Blood Glucose.: 176 mg/dL (21 Jul 2023 13:08)  POCT Blood Glucose.: 103 mg/dL (21 Jul 2023 11:51)  POCT Blood Glucose.: 118 mg/dL (21 Jul 2023 10:55)  POCT Blood Glucose.: 122 mg/dL (21 Jul 2023 10:13)  POCT Blood Glucose.: 194 mg/dL (21 Jul 2023 08:59)  POCT Blood Glucose.: 134 mg/dL (21 Jul 2023 07:51)  POCT Blood Glucose.: 151 mg/dL (21 Jul 2023 06:47)  POCT Blood Glucose.: 149 mg/dL (21 Jul 2023 06:17)  POCT Blood Glucose.: 158 mg/dL (21 Jul 2023 05:41)  POCT Blood Glucose.: 90 mg/dL (21 Jul 2023 03:03)  POCT Blood Glucose.: 110 mg/dL (21 Jul 2023 01:46)  POCT Blood Glucose.: 138 mg/dL (20 Jul 2023 22:01)  POCT Blood Glucose.: 185 mg/dL (20 Jul 2023 21:09)  POCT Blood Glucose.: 147 mg/dL (20 Jul 2023 20:12)  POCT Blood Glucose.: 137 mg/dL (20 Jul 2023 19:01)  POCT Blood Glucose.: 150 mg/dL (20 Jul 2023 18:11)  POCT Blood Glucose.: 155 mg/dL (20 Jul 2023 17:13)  POCT Blood Glucose.: 188 mg/dL (20 Jul 2023 16:10)  POCT Blood Glucose.: 186 mg/dL (20 Jul 2023 15:04)     ==================>> LAB AND IMAGING <<==================                        9.5    9.88  )-----------( 159      ( 21 Jul 2023 01:03 )             29.4        07-21    143  |  110<H>  |  22  ----------------------------<  119<H>  3.8   |  23  |  0.97    Ca    8.8      21 Jul 2023 01:03  Phos  1.8     07-21  Mg     2.3     07-21    TPro  6.0  /  Alb  3.4  /  TBili  0.7  /  DBili  x   /  AST  37  /  ALT  28  /  AlkPhos  55  07-21    WBC count:   9.88 <<== ,  6.45 <<== ,  7.31 <<== ,  6.48 <<== ,  6.58 <<==   Hemoglobin:   9.5 <<==,  9.0 <<==,  10.1 <<==,  14.5 <<==,  14.6 <<==  platelets:  159 <==, 159 <==, 155 <==, 182 <==, 190 <==, 169 <==    Creatinine:  0.97  <<==, 1.18  <<==, 0.96  <<==, 1.07  <<==, 1.07  <<==, 1.02  <<==  Sodium:   143  <==, 146  <==, 147  <==, 137  <==, 135  <==, 138  <==       AST:          37 <== , 48 <== , 59 <== , 27 <==      ALT:        28  <== , 27  <== , 31  <== , 42  <==      AP:        55  <=, 51  <=, 59  <=, 94  <=     Bili:        0.7  <=, 1.1  <=, 1.4  <=, 0.9  <=    ____________________________    M I C R O B I O L O G Y :                          from: VA Duplex Carotid, Bilat (07.18.23 @ 10:17) >  IMPRESSION: No significant hemodynamic stenosis of either carotid artery.  < end of copied text >    < from: TTE W or WO Ultrasound Enhancing Agent (07.18.23 @ 08:37) >  CONCLUSIONS:    1. Normal left ventricular cavity size. The left ventricular wall thickness is normal. The left ventricular systolic function is normal with an ejection fraction of 65 % by Black's method of disks. There are regional wall motion abnormalities No evidence of a thrombus in the left ventricle.   2. Mid inferolateral segment and mid anterolateral segment are abnormal.      Mid inferolateral segment and mid anterolateral segment are abnormal.   3. Normal atria.   4. Normal right ventricular cavity size, normal right ventricular wall thickness and normal right ventricular systolic function. The tricuspid annular plane systolic excursion (TAPSE) is 2.1 cm (normal >=1.7 cm).   5. No significant valvular disease.   6. No pericardial effusion seen.   7. No prior echocardiogram is available for comparison.  < end of copied text >    < from: Cardiac Catheterization (07.14.23 @ 09:51) >  Procedures:                 1.    Arterial Access - Right Radial   2.    Left Heart Cath   3.    Diagnostic Coronary Angiography   Primary ICD-10: I25.110 - Atherosclerotic heart disease of native  coronary artery with unstable angina pectoris   Secondary ICD-10   E11.59 - Type 2 diabetes mellitus with other circulatory complications  I10 - Essential (primary) hypertension     Diagnostic Conclusions:   Patient has severe triple vessel disease as described with progressive  symptoms of angin    Recommendations:   Revascularization with CABG. Transfer to [facility]   < end of copied text >    ___________________________________________________________________________________  ===============>>  A S S E S S M E N T   A N D   P L A N <<===============  ------------------------------------------------------------------------------------------    · Assessment	  67-year-old male with NIDDM2, HTN, HLD, asthma, CPAP on LUCRECIA, sent to the ED by Dr. Hart for work-up of EKG abnormalities.     Problem/Plan - 1:  ·  Problem: Chest pain / Angina in patient with severe CAD.    Patient status post CABG, in CTICU   Appreciated CT surgery, critical care, CT ICU  monitor on tele  Wean off pressers, o2, Insulin drip ...  med optimization per cardiologist  Management of chest tubes as per surgery team  pain management as needed  supportive care   patient / oob as able     Problem/Plan - 2:  ·  Problem: type 2 diabetes mellitus.   FS Q6H w/ISS.  monitor  FS closely asia- op  endocrine appreciated      Problem/Plan - 3:  ·  Problem: LUCRECIA on CPAP.   > would need CPAP Nightly     --------------------------------------------  Case discussed with patient, RN..   ___________________________  Will follow with you  thank you  NOREEN Wilkinson D.O.  Pager: [phone]     _________________________________________________________________________________________  ========>>  M E D I C A L   A T T E N D I N G    F O L L O W  U P  N O T E  <<=========  -----------------------------------------------------------------------------------------------------    - Patient seen and examined by me earlier today.  patient seen post op CABG, in CTICU  - In summary,  GILA SHAH is a 67y year old man admitted with CP / CAD   - Patient doing ok post op CABG X5! more comfortable, in chair today, eating, pain improved     ==================>> REVIEW OF SYSTEM <<=================    GEN: no fever, no chills, + some pain post op> less   RESP: no SOB, occasional cough, no sputum  CVS:  chest pain, no palpitations, no edema  GI: no abdominal pain, no nausea, no vomiting  : no dysuria, no issues with hernández   NEURO: no headache, no dizziness  PSYCH: no depression, not anxious  Derm : no itching, no rash    ==================>> PHYSICAL EXAM <<=================    GEN: Alert, Oriented, in chair, NAD , comfortable in chair,,   HEENT: NCAT, MMM, PERRL  Neck: supple , central line in place   CVS: S1S2 , regular   PULM: CTA B/L,  limited ... chest tubes in place   ABD.: soft. non tender, non distended  Extrem: legs wrapped,+ hernández   neuro-Psych: alert, flat affect                             ( Note written / Date of service 07-21-23 )    ==================>> MEDICATIONS <<====================    acetaminophen     Tablet .. 650 milliGRAM(s) Oral every 6 hours  amLODIPine   Tablet 5 milliGRAM(s) Oral daily  ascorbic acid 500 milliGRAM(s) Oral two times a day  aspirin enteric coated 81 milliGRAM(s) Oral daily  atorvastatin 80 milliGRAM(s) Oral at bedtime  bisacodyl Suppository 10 milliGRAM(s) Rectal once  chlorhexidine 2% Cloths 1 Application(s) Topical daily  enoxaparin Injectable 40 milliGRAM(s) SubCutaneous every 24 hours  furosemide    Tablet 20 milliGRAM(s) Oral daily  insulin regular Infusion 3 Unit(s)/Hr IV Continuous <Continuous>  metoprolol tartrate 25 milliGRAM(s) Oral every 8 hours  mupirocin 2% Ointment 1 Application(s) Both Nostrils two times a day  pantoprazole    Tablet 40 milliGRAM(s) Oral before breakfast  polyethylene glycol 3350 17 Gram(s) Oral daily  senna 2 Tablet(s) Oral at bedtime  sodium chloride 0.9%. 1000 milliLiter(s) IV Continuous <Continuous>    ___________  Active diet:  Diet, Regular:   Consistent Carbohydrate No Snacks (CSTCHO)  ___________________    ==================>> VITAL SIGNS <<==================  Height (cm): 162.6  Weight (kg): 79  BMI (kg/m2): 29.9  Vital Signs Last 24 HrsT(C): 37.7 (07-21-23 @ 12:00)  T(F): 99.9 (07-21-23 @ 12:00), Max: 100.2 (07-21-23 @ 00:00)  HR: 88 (07-21-23 @ 14:00) (84 - 98)  BP: 153/79 (07-21-23 @ 00:00)  RR: 3 (07-21-23 @ 14:00) (3 - 31)  SpO2: 96% (07-21-23 @ 14:00) (90% - 97%)      CAPILLARY BLOOD GLUCOSE  165 (21 Jul 2023 14:00)  175 (21 Jul 2023 13:00)  103 (21 Jul 2023 12:00)  118 (21 Jul 2023 11:00)  122 (21 Jul 2023 10:00)  194 (21 Jul 2023 09:00)  134 (21 Jul 2023 08:00)  151 (21 Jul 2023 07:00)  158 (21 Jul 2023 05:00)  137 (21 Jul 2023 04:00)  90 (21 Jul 2023 03:00)  110 (21 Jul 2023 02:00)  113 (21 Jul 2023 00:00)  129 (20 Jul 2023 23:00)  138 (20 Jul 2023 22:00)  187 (20 Jul 2023 21:00)  147 (20 Jul 2023 20:00)      POCT Blood Glucose.: 165 mg/dL (21 Jul 2023 13:56)  POCT Blood Glucose.: 176 mg/dL (21 Jul 2023 13:08)  POCT Blood Glucose.: 103 mg/dL (21 Jul 2023 11:51)  POCT Blood Glucose.: 118 mg/dL (21 Jul 2023 10:55)  POCT Blood Glucose.: 122 mg/dL (21 Jul 2023 10:13)  POCT Blood Glucose.: 194 mg/dL (21 Jul 2023 08:59)  POCT Blood Glucose.: 134 mg/dL (21 Jul 2023 07:51)  POCT Blood Glucose.: 151 mg/dL (21 Jul 2023 06:47)  POCT Blood Glucose.: 149 mg/dL (21 Jul 2023 06:17)  POCT Blood Glucose.: 158 mg/dL (21 Jul 2023 05:41)  POCT Blood Glucose.: 90 mg/dL (21 Jul 2023 03:03)  POCT Blood Glucose.: 110 mg/dL (21 Jul 2023 01:46)  POCT Blood Glucose.: 138 mg/dL (20 Jul 2023 22:01)  POCT Blood Glucose.: 185 mg/dL (20 Jul 2023 21:09)  POCT Blood Glucose.: 147 mg/dL (20 Jul 2023 20:12)  POCT Blood Glucose.: 137 mg/dL (20 Jul 2023 19:01)  POCT Blood Glucose.: 150 mg/dL (20 Jul 2023 18:11)  POCT Blood Glucose.: 155 mg/dL (20 Jul 2023 17:13)  POCT Blood Glucose.: 188 mg/dL (20 Jul 2023 16:10)  POCT Blood Glucose.: 186 mg/dL (20 Jul 2023 15:04)     ==================>> LAB AND IMAGING <<==================                        9.5    9.88  )-----------( 159      ( 21 Jul 2023 01:03 )             29.4        07-21    143  |  110<H>  |  22  ----------------------------<  119<H>  3.8   |  23  |  0.97    Ca    8.8      21 Jul 2023 01:03  Phos  1.8     07-21  Mg     2.3     07-21    TPro  6.0  /  Alb  3.4  /  TBili  0.7  /  DBili  x   /  AST  37  /  ALT  28  /  AlkPhos  55  07-21    WBC count:   9.88 <<== ,  6.45 <<== ,  7.31 <<== ,  6.48 <<== ,  6.58 <<==   Hemoglobin:   9.5 <<==,  9.0 <<==,  10.1 <<==,  14.5 <<==,  14.6 <<==  platelets:  159 <==, 159 <==, 155 <==, 182 <==, 190 <==, 169 <==    Creatinine:  0.97  <<==, 1.18  <<==, 0.96  <<==, 1.07  <<==, 1.07  <<==, 1.02  <<==  Sodium:   143  <==, 146  <==, 147  <==, 137  <==, 135  <==, 138  <==       AST:          37 <== , 48 <== , 59 <== , 27 <==      ALT:        28  <== , 27  <== , 31  <== , 42  <==      AP:        55  <=, 51  <=, 59  <=, 94  <=     Bili:        0.7  <=, 1.1  <=, 1.4  <=, 0.9  <=                      from: [facility] Duplex Carotid, Bilat (07.18.23 @ 10:17) >  IMPRESSION: No significant hemodynamic stenosis of either carotid artery.  < end of copied text >    < from: TTE W or WO Ultrasound Enhancing Agent (07.18.23 @ 08:37) >  CONCLUSIONS:    1. Normal left ventricular cavity size. The left ventricular wall thickness is normal. The left ventricular systolic function is normal with an ejection fraction of 65 % by Black's method of disks. There are regional wall motion abnormalities No evidence of a thrombus in the left ventricle.   2. Mid inferolateral segment and mid anterolateral segment are abnormal.      Mid inferolateral segment and mid anterolateral segment are abnormal.   3. Normal atria.   4. Normal right ventricular cavity size, normal right ventricular wall thickness and normal right ventricular systolic function. The tricuspid annular plane systolic excursion (TAPSE) is 2.1 cm (normal >=1.7 cm).   5. No significant valvular disease.   6. No pericardial effusion seen.   7. No prior echocardiogram is available for comparison.  < end of copied text >    < from: Cardiac Catheterization (07.14.23 @ 09:51) >  Procedures:                 1.    Arterial Access - Right Radial   2.    Left Heart Cath   3.    Diagnostic Coronary Angiography   Primary ICD-10: I25.110 - Atherosclerotic heart disease of native  coronary artery with unstable angina pectoris   Secondary ICD-10   E11.59 - Type 2 diabetes mellitus with other circulatory complications  I10 - Essential (primary) hypertension     Diagnostic Conclusions:   Patient has severe triple vessel disease as described with progressive  symptoms of angin    Recommendations:   Revascularization with CABG. Transfer to [facility]   < end of copied text >    ___________________________________________________________________________________  ===============>>  A S S E S S M E N T   A N D   P L A N <<===============  ------------------------------------------------------------------------------------------    · Assessment	  67-year-old male with NIDDM2, HTN, HLD, asthma, CPAP on LUCRECIA, sent to the ED by Dr. Hart for work-up of EKG abnormalities.     Problem/Plan - 1:  ·  Problem: Chest pain / Angina in patient with severe CAD.    Patient status post CABG, in CTICU   Appreciated CT surgery, critical care, CT ICU  monitor on tele  Wean off pressers, o2, Insulin drip ...  med optimization per cardiologist  Management of chest tubes as per surgery team  pain management as needed  supportive care   patient / oob as able     Problem/Plan - 2:  ·  Problem: type 2 diabetes mellitus.   FS Q6H w/ISS.  monitor  FS closely asia- op  endocrine appreciated      Problem/Plan - 3:  ·  Problem: LUCRECIA on CPAP.   > would need CPAP Nightly     --------------------------------------------  Case discussed with patient, RN..   ___________________________  Will follow with you  thank you  NOREEN Wilkinson D.O.  Pager: [phone]

## 2023-07-21 NOTE — OCCUPATIONAL THERAPY INITIAL EVALUATION ADULT - PERTINENT HX OF CURRENT PROBLEM, REHAB EVAL
67-year-old male with NIDDM2, HTN, HLD, asthma, CPAP on LUCRECIA, sent to the ED by Dr. Hart for work-up of EKG abnormalities. pain is described as a pressure over left and right anterior chest wall, radiating to R shoulder, 10/10 in severity, lasting 15-30 minutes at a time, worse with exertion, associated with diaphoresis and without any associated nausea, shortness of breath or palpitations. He denies any history of CAD, prior cath/echo or stress testing. He reports prior w/u at [facility] however unsure of results or testing performed  Cardiac cath done on 7/14/23 showed - [facility]: pLAD 90%, D1 95%, mLCx 90%, mRCA 90%, LVEDP 15, Transferred to [facility] for CABg eval w/ Dr Hall  Pt now s/p CABG, 2 arterial and 3 venous.

## 2023-07-21 NOTE — PROGRESS NOTE ADULT - PROBLEM SELECTOR PLAN 1
Will continue current insulin regimen for now. Postop IV insulin  Can give basal insulin tonight 20 units and stop IV insulin 2 hours after if tolerating meals better to transition off IV insulin  Will continue monitoring FS, log, and glucose trends, will Follow up.  Patient counseled for compliance with consistent low carb diet and exercise as tolerated outpatient. Will continue current insulin regimen for now. Postop IV insulin  Can give basal insulin tonight 20 units and stop IV insulin 2 hours after if tolerating meals better to transition off IV insulin  Will continue monitoring FS, log, and glucose trends, will Follow up.  if start eating put on 6 units lispro pre-each meal  Patient counseled for compliance with consistent low carb diet and exercise as tolerated outpatient.

## 2023-07-21 NOTE — PROGRESS NOTE ADULT - SUBJECTIVE AND OBJECTIVE BOX
Cardiovascular Disease Progress Note    Overnight events: No acute events overnight.  no new cardiac sx  Otherwise review of systems negative    Objective Findings:  T(C): 37.8 (07-21-23 @ 04:00), Max: 37.9 (07-21-23 @ 00:00)  HR: 91 (07-21-23 @ 06:00) (83 - 98)  BP: 153/79 (07-21-23 @ 00:00) (125/58 - 156/80)  RR: 17 (07-21-23 @ 05:00) (9 - 31)  SpO2: 95% (07-21-23 @ 06:00) (89% - 97%)  Wt(kg): --  Daily     Daily       Physical Exam:  Gen: NAD  HEENT: EOMI  CV: RRR, normal S1 + S2, no m/r/g  Lungs: CTAB  Abd: soft, non-tender  Ext: No edema    Telemetry:    Laboratory Data:                        9.5    9.88  )-----------( 159      ( 21 Jul 2023 01:03 )             29.4     07-21    143  |  110<H>  |  22  ----------------------------<  119<H>  3.8   |  23  |  0.97    Ca    8.8      21 Jul 2023 01:03  Phos  1.8     07-21  Mg     2.3     07-21    TPro  6.0  /  Alb  3.4  /  TBili  0.7  /  DBili  x   /  AST  37  /  ALT  28  /  AlkPhos  55  07-21    PT/INR - ( 20 Jul 2023 00:33 )   PT: 14.8 sec;   INR: 1.27 ratio         PTT - ( 20 Jul 2023 00:33 )  PTT:28.4 sec  CARDIAC MARKERS ( 19 Jul 2023 15:21 )  x     / x     / 1252 U/L / x     / 50.7 ng/mL          Inpatient Medications:  MEDICATIONS  (STANDING):  acetaminophen     Tablet .. 650 milliGRAM(s) Oral every 6 hours  amLODIPine   Tablet 5 milliGRAM(s) Oral daily  ascorbic acid 500 milliGRAM(s) Oral two times a day  aspirin enteric coated 81 milliGRAM(s) Oral daily  atorvastatin 80 milliGRAM(s) Oral at bedtime  bisacodyl Suppository 10 milliGRAM(s) Rectal once  chlorhexidine 2% Cloths 1 Application(s) Topical daily  enoxaparin Injectable 40 milliGRAM(s) SubCutaneous every 24 hours  gabapentin 100 milliGRAM(s) Oral every 8 hours  insulin regular Infusion 3 Unit(s)/Hr (3 mL/Hr) IV Continuous <Continuous>  metoprolol tartrate 25 milliGRAM(s) Oral every 8 hours  mupirocin 2% Ointment 1 Application(s) Both Nostrils two times a day  pantoprazole    Tablet 40 milliGRAM(s) Oral before breakfast  polyethylene glycol 3350 17 Gram(s) Oral daily  senna 2 Tablet(s) Oral at bedtime  sodium chloride 0.9%. 1000 milliLiter(s) (10 mL/Hr) IV Continuous <Continuous>      Assessment:  67-year-old male with NIDDM2, HTN, HLD, asthma, CPAP on LUCRECIA presents with unstable angina now s/p cabg    Recs:  s/p cabg under the excellent care of dr benitez avina asa statin and antianginals  care per ctu  management of drains and pacer wires per cts  pain control  maintain euvolemic  dvt ppx    Over 25 minutes spent on total encounter; more than 50% of the visit was spent counseling and/or coordinating care by the attending physician.      Shaun Nixon MD   Cardiovascular Disease  [phone]

## 2023-07-21 NOTE — CONSULT NOTE ADULT - ASSESSMENT
67-year-old male with NIDDM2, HTN, HLD, asthma, CPAP on LUCRECIA, sent to the ED by Dr. Hart/cardio  for work-up of EKG abnormalities s/p CABG now.  neuro called for AMS post op   Cardiac cath done on 7/14/23 showed - [facility]: pLAD 90%, D1 95%, mLCx 90%, mRCA 90%, LVEDP 15   now s/p CABG   A1c 7 LDL 57  CD 7/18/23 no significant hemodynamic significant stenosis   Tmax 100.2   NIHSS 2 premrs 0    Impression:   AMS of unclear etiology but non focal exam. possible toxic metabolic in nature     - check CTH  - r/o infection/ Tmax 100.2   - c/w asa and statin therapy  - unable for MRI at present  - if no improvement will considser further workup with rEEG in future    - telemetry  - PT/OT   - check FS, glucose control <180  - GI/DVT ppx  - Counseling on diet, exercise, and medication adherence was done  - Counseling on smoking cessation and alcohol consumption offered when appropriate.  - Pain assessed and judicious use of narcotics when appropriate was discussed.    - Stroke education given when appropriate.  - Importance of fall prevention discussed.   - Differential diagnosis and plan of care discussed with patient and/or family and primary team  - Thank you for allowing me to participate in the care of this patient. Call with questions.   Donavan Gonzalez MD  Vascular Neurology  Office: [phone]

## 2023-07-21 NOTE — OCCUPATIONAL THERAPY INITIAL EVALUATION ADULT - GENERAL OBSERVATIONS, REHAB EVAL
Patient found seated in bedside chair, JOSEPH Bo present. +ICU monitoring  +IVL +RIJ +hernández +EPM +CTx4 +6L O2 via NC.

## 2023-07-21 NOTE — OCCUPATIONAL THERAPY INITIAL EVALUATION ADULT - IMPAIRED TRANSFERS: SIT/STAND, REHAB EVAL
Pt tolerated functional mobility in hallway w/ CGA rolling walker and multimodal cues to navigate obstacles/impaired balance/cognition/decreased strength

## 2023-07-21 NOTE — PROGRESS NOTE ADULT - ASSESSMENT
Assessment  DMT2: 67y Male with DM T2 with hyperglycemia, A1C 7%, postop IV insulin, extubated and  diet advanced. AMS not eating full meals consistently.    Will need tight glycemic control for postop wound healing.   CAD: on medications, stable, monitored. s/p CABG  ?Hypothyroidism: Elevated TSH 9, Free thyroxine levels not available, pending, no hx of thyroid disease.   HTN: on antihypertensive medications, monitored, asymptomatic.  HLD: on high intensity statin      Discussed plan and management with Attending   Addi Pelayo NP - TEAMS  Dr Yonatan Zapata  [phone]

## 2023-07-21 NOTE — OCCUPATIONAL THERAPY INITIAL EVALUATION ADULT - NS ASR FOLLOW COMMAND OT EVAL
50% of the time/able to follow single-step instructions attempted SBT, pt unable to follow commands necessary to complete at this time./50% of the time/able to follow single-step instructions

## 2023-07-21 NOTE — PROGRESS NOTE ADULT - SUBJECTIVE AND OBJECTIVE BOX
Chief complaint  Patient is a 67y old  Male who presents with a chief complaint of 3 VCAD (21 Jul 2023 11:15)         Labs and Fingersticks  CAPILLARY BLOOD GLUCOSE  165 (21 Jul 2023 14:00)  175 (21 Jul 2023 13:00)  103 (21 Jul 2023 12:00)  118 (21 Jul 2023 11:00)  122 (21 Jul 2023 10:00)  194 (21 Jul 2023 09:00)  134 (21 Jul 2023 08:00)  151 (21 Jul 2023 07:00)  158 (21 Jul 2023 05:00)  137 (21 Jul 2023 04:00)  90 (21 Jul 2023 03:00)  110 (21 Jul 2023 02:00)  113 (21 Jul 2023 00:00)  129 (20 Jul 2023 23:00)  138 (20 Jul 2023 22:00)  187 (20 Jul 2023 21:00)  147 (20 Jul 2023 20:00)      POCT Blood Glucose.: 165 mg/dL (21 Jul 2023 13:56)  POCT Blood Glucose.: 176 mg/dL (21 Jul 2023 13:08)  POCT Blood Glucose.: 103 mg/dL (21 Jul 2023 11:51)  POCT Blood Glucose.: 118 mg/dL (21 Jul 2023 10:55)  POCT Blood Glucose.: 122 mg/dL (21 Jul 2023 10:13)  POCT Blood Glucose.: 194 mg/dL (21 Jul 2023 08:59)  POCT Blood Glucose.: 134 mg/dL (21 Jul 2023 07:51)  POCT Blood Glucose.: 151 mg/dL (21 Jul 2023 06:47)  POCT Blood Glucose.: 149 mg/dL (21 Jul 2023 06:17)  POCT Blood Glucose.: 158 mg/dL (21 Jul 2023 05:41)  POCT Blood Glucose.: 90 mg/dL (21 Jul 2023 03:03)  POCT Blood Glucose.: 110 mg/dL (21 Jul 2023 01:46)  POCT Blood Glucose.: 138 mg/dL (20 Jul 2023 22:01)  POCT Blood Glucose.: 185 mg/dL (20 Jul 2023 21:09)  POCT Blood Glucose.: 147 mg/dL (20 Jul 2023 20:12)  POCT Blood Glucose.: 137 mg/dL (20 Jul 2023 19:01)  POCT Blood Glucose.: 150 mg/dL (20 Jul 2023 18:11)  POCT Blood Glucose.: 155 mg/dL (20 Jul 2023 17:13)  POCT Blood Glucose.: 188 mg/dL (20 Jul 2023 16:10)  POCT Blood Glucose.: 186 mg/dL (20 Jul 2023 15:04)  POCT Blood Glucose.: 192 mg/dL (20 Jul 2023 14:20)      Anion Gap: 10 (07-21 @ 01:03)  Anion Gap: 11 (07-20 @ 00:33)  Anion Gap: 11 (07-19 @ 15:21)      Calcium: 8.8 (07-21 @ 01:03)  Calcium: 8.3 *L* (07-20 @ 00:33)  Calcium: 8.3 *L* (07-19 @ 15:21)  Albumin: 3.4 (07-21 @ 01:03)  Albumin: 3.5 (07-20 @ 00:33)  Albumin: 3.1 *L* (07-19 @ 15:21)    Alanine Aminotransferase (ALT/SGPT): 28 (07-21 @ 01:03)  Alanine Aminotransferase (ALT/SGPT): 27 (07-20 @ 00:33)  Alanine Aminotransferase (ALT/SGPT): 31 (07-19 @ 15:21)  Alkaline Phosphatase: 55 (07-21 @ 01:03)  Alkaline Phosphatase: 51 (07-20 @ 00:33)  Alkaline Phosphatase: 59 (07-19 @ 15:21)  Aspartate Aminotransferase (AST/SGOT): 37 (07-21 @ 01:03)  Aspartate Aminotransferase (AST/SGOT): 48 *H* (07-20 @ 00:33)  Aspartate Aminotransferase (AST/SGOT): 59 *H* (07-19 @ 15:21)        07-21    143  |  110<H>  |  22  ----------------------------<  119<H>  3.8   |  23  |  0.97    Ca    8.8      21 Jul 2023 01:03  Phos  1.8     07-21  Mg     2.3     07-21    TPro  6.0  /  Alb  3.4  /  TBili  0.7  /  DBili  x   /  AST  37  /  ALT  28  /  AlkPhos  55  07-21                        9.5    9.88  )-----------( 159      ( 21 Jul 2023 01:03 )             29.4     Medications  MEDICATIONS  (STANDING):  acetaminophen     Tablet .. 650 milliGRAM(s) Oral every 6 hours  amLODIPine   Tablet 5 milliGRAM(s) Oral daily  ascorbic acid 500 milliGRAM(s) Oral two times a day  aspirin enteric coated 81 milliGRAM(s) Oral daily  atorvastatin 80 milliGRAM(s) Oral at bedtime  bisacodyl Suppository 10 milliGRAM(s) Rectal once  chlorhexidine 2% Cloths 1 Application(s) Topical daily  enoxaparin Injectable 40 milliGRAM(s) SubCutaneous every 24 hours  furosemide    Tablet 20 milliGRAM(s) Oral daily  insulin regular Infusion 3 Unit(s)/Hr (3 mL/Hr) IV Continuous <Continuous>  metoprolol tartrate 25 milliGRAM(s) Oral every 8 hours  mupirocin 2% Ointment 1 Application(s) Both Nostrils two times a day  pantoprazole    Tablet 40 milliGRAM(s) Oral before breakfast  polyethylene glycol 3350 17 Gram(s) Oral daily  senna 2 Tablet(s) Oral at bedtime  sodium chloride 0.9%. 1000 milliLiter(s) (10 mL/Hr) IV Continuous <Continuous>      Physical Exam  General: Patient comfortable in bed   Vital Signs Last 12 Hrs  T(F): 99.9 (07-21-23 @ 12:00), Max: 100 (07-21-23 @ 04:00)  HR: 88 (07-21-23 @ 14:00) (85 - 92)  BP: --  BP(mean): --  RR: 3 (07-21-23 @ 14:00) (3 - 25)  SpO2: 96% (07-21-23 @ 14:00) (93% - 96%)    CVS: S1S2   Respiratory: No wheezing, no crepitations  GI: Abdomen soft, bowel sounds positive  Musculoskeletal:  moves all extremities  : Voiding

## 2023-07-21 NOTE — OCCUPATIONAL THERAPY INITIAL EVALUATION ADULT - ADDITIONAL COMMENTS
Per chart review, pt lives in a 2 family house on the 2nd floor, ~27 stairs to navigate. Pt lives w/ spouse, son and dtr in law.

## 2023-07-22 LAB
ALBUMIN SERPL ELPH-MCNC: 3.2 G/DL — LOW (ref 3.3–5)
ALP SERPL-CCNC: 62 U/L — SIGNIFICANT CHANGE UP (ref 40–120)
ALT FLD-CCNC: 46 U/L — HIGH (ref 10–45)
ANION GAP SERPL CALC-SCNC: 10 MMOL/L — SIGNIFICANT CHANGE UP (ref 5–17)
AST SERPL-CCNC: 44 U/L — HIGH (ref 10–40)
BASOPHILS # BLD AUTO: 0.03 K/UL — SIGNIFICANT CHANGE UP (ref 0–0.2)
BASOPHILS NFR BLD AUTO: 0.3 % — SIGNIFICANT CHANGE UP (ref 0–2)
BILIRUB SERPL-MCNC: 0.9 MG/DL — SIGNIFICANT CHANGE UP (ref 0.2–1.2)
BLD GP AB SCN SERPL QL: NEGATIVE — SIGNIFICANT CHANGE UP
BUN SERPL-MCNC: 24 MG/DL — HIGH (ref 7–23)
CALCIUM SERPL-MCNC: 8.5 MG/DL — SIGNIFICANT CHANGE UP (ref 8.4–10.5)
CHLORIDE SERPL-SCNC: 109 MMOL/L — HIGH (ref 96–108)
CO2 SERPL-SCNC: 23 MMOL/L — SIGNIFICANT CHANGE UP (ref 22–31)
CREAT SERPL-MCNC: 0.92 MG/DL — SIGNIFICANT CHANGE UP (ref 0.5–1.3)
EGFR: 92 ML/MIN/1.73M2 — SIGNIFICANT CHANGE UP
EOSINOPHIL # BLD AUTO: 0.03 K/UL — SIGNIFICANT CHANGE UP (ref 0–0.5)
EOSINOPHIL NFR BLD AUTO: 0.3 % — SIGNIFICANT CHANGE UP (ref 0–6)
GAS PNL BLDA: SIGNIFICANT CHANGE UP
GLUCOSE BLDC GLUCOMTR-MCNC: 105 MG/DL — HIGH (ref 70–99)
GLUCOSE BLDC GLUCOMTR-MCNC: 112 MG/DL — HIGH (ref 70–99)
GLUCOSE BLDC GLUCOMTR-MCNC: 119 MG/DL — HIGH (ref 70–99)
GLUCOSE BLDC GLUCOMTR-MCNC: 121 MG/DL — HIGH (ref 70–99)
GLUCOSE BLDC GLUCOMTR-MCNC: 126 MG/DL — HIGH (ref 70–99)
GLUCOSE BLDC GLUCOMTR-MCNC: 139 MG/DL — HIGH (ref 70–99)
GLUCOSE BLDC GLUCOMTR-MCNC: 140 MG/DL — HIGH (ref 70–99)
GLUCOSE BLDC GLUCOMTR-MCNC: 153 MG/DL — HIGH (ref 70–99)
GLUCOSE BLDC GLUCOMTR-MCNC: 160 MG/DL — HIGH (ref 70–99)
GLUCOSE BLDC GLUCOMTR-MCNC: 173 MG/DL — HIGH (ref 70–99)
GLUCOSE BLDC GLUCOMTR-MCNC: 182 MG/DL — HIGH (ref 70–99)
GLUCOSE BLDC GLUCOMTR-MCNC: 183 MG/DL — HIGH (ref 70–99)
GLUCOSE BLDC GLUCOMTR-MCNC: 187 MG/DL — HIGH (ref 70–99)
GLUCOSE BLDC GLUCOMTR-MCNC: 217 MG/DL — HIGH (ref 70–99)
GLUCOSE BLDC GLUCOMTR-MCNC: 85 MG/DL — SIGNIFICANT CHANGE UP (ref 70–99)
GLUCOSE BLDC GLUCOMTR-MCNC: 96 MG/DL — SIGNIFICANT CHANGE UP (ref 70–99)
GLUCOSE BLDC GLUCOMTR-MCNC: 97 MG/DL — SIGNIFICANT CHANGE UP (ref 70–99)
GLUCOSE SERPL-MCNC: 108 MG/DL — HIGH (ref 70–99)
HCT VFR BLD CALC: 27.9 % — LOW (ref 39–50)
HGB BLD-MCNC: 8.9 G/DL — LOW (ref 13–17)
IMM GRANULOCYTES NFR BLD AUTO: 0.5 % — SIGNIFICANT CHANGE UP (ref 0–0.9)
LYMPHOCYTES # BLD AUTO: 1.81 K/UL — SIGNIFICANT CHANGE UP (ref 1–3.3)
LYMPHOCYTES # BLD AUTO: 19 % — SIGNIFICANT CHANGE UP (ref 13–44)
MAGNESIUM SERPL-MCNC: 2.2 MG/DL — SIGNIFICANT CHANGE UP (ref 1.6–2.6)
MCHC RBC-ENTMCNC: 27.1 PG — SIGNIFICANT CHANGE UP (ref 27–34)
MCHC RBC-ENTMCNC: 31.9 GM/DL — LOW (ref 32–36)
MCV RBC AUTO: 84.8 FL — SIGNIFICANT CHANGE UP (ref 80–100)
MONOCYTES # BLD AUTO: 0.81 K/UL — SIGNIFICANT CHANGE UP (ref 0–0.9)
MONOCYTES NFR BLD AUTO: 8.5 % — SIGNIFICANT CHANGE UP (ref 2–14)
NEUTROPHILS # BLD AUTO: 6.79 K/UL — SIGNIFICANT CHANGE UP (ref 1.8–7.4)
NEUTROPHILS NFR BLD AUTO: 71.4 % — SIGNIFICANT CHANGE UP (ref 43–77)
NRBC # BLD: 0 /100 WBCS — SIGNIFICANT CHANGE UP (ref 0–0)
PHOSPHATE SERPL-MCNC: 1.9 MG/DL — LOW (ref 2.5–4.5)
PLATELET # BLD AUTO: 141 K/UL — LOW (ref 150–400)
POTASSIUM SERPL-MCNC: 3.5 MMOL/L — SIGNIFICANT CHANGE UP (ref 3.5–5.3)
POTASSIUM SERPL-SCNC: 3.5 MMOL/L — SIGNIFICANT CHANGE UP (ref 3.5–5.3)
PROT SERPL-MCNC: 5.9 G/DL — LOW (ref 6–8.3)
RBC # BLD: 3.29 M/UL — LOW (ref 4.2–5.8)
RBC # FLD: 16.1 % — HIGH (ref 10.3–14.5)
RH IG SCN BLD-IMP: POSITIVE — SIGNIFICANT CHANGE UP
SODIUM SERPL-SCNC: 142 MMOL/L — SIGNIFICANT CHANGE UP (ref 135–145)
WBC # BLD: 9.52 K/UL — SIGNIFICANT CHANGE UP (ref 3.8–10.5)
WBC # FLD AUTO: 9.52 K/UL — SIGNIFICANT CHANGE UP (ref 3.8–10.5)

## 2023-07-22 PROCEDURE — 99291 CRITICAL CARE FIRST HOUR: CPT

## 2023-07-22 PROCEDURE — 71045 X-RAY EXAM CHEST 1 VIEW: CPT | Mod: 26

## 2023-07-22 PROCEDURE — 71045 X-RAY EXAM CHEST 1 VIEW: CPT | Mod: 26,77

## 2023-07-22 RX ORDER — ONDANSETRON 8 MG/1
4 TABLET, FILM COATED ORAL ONCE
Refills: 0 | Status: COMPLETED | OUTPATIENT
Start: 2023-07-22 | End: 2023-07-22

## 2023-07-22 RX ORDER — INSULIN LISPRO 100/ML
6 VIAL (ML) SUBCUTANEOUS
Refills: 0 | Status: DISCONTINUED | OUTPATIENT
Start: 2023-07-23 | End: 2023-07-23

## 2023-07-22 RX ORDER — POTASSIUM CHLORIDE 20 MEQ
10 PACKET (EA) ORAL
Refills: 0 | Status: COMPLETED | OUTPATIENT
Start: 2023-07-22 | End: 2023-07-22

## 2023-07-22 RX ORDER — INSULIN GLARGINE 100 [IU]/ML
20 INJECTION, SOLUTION SUBCUTANEOUS AT BEDTIME
Refills: 0 | Status: DISCONTINUED | OUTPATIENT
Start: 2023-07-22 | End: 2023-07-23

## 2023-07-22 RX ORDER — METOCLOPRAMIDE HCL 10 MG
10 TABLET ORAL EVERY 8 HOURS
Refills: 0 | Status: COMPLETED | OUTPATIENT
Start: 2023-07-22 | End: 2023-07-23

## 2023-07-22 RX ORDER — INSULIN LISPRO 100/ML
VIAL (ML) SUBCUTANEOUS
Refills: 0 | Status: DISCONTINUED | OUTPATIENT
Start: 2023-07-23 | End: 2023-07-25

## 2023-07-22 RX ADMIN — Medication 10 MILLIGRAM(S): at 06:09

## 2023-07-22 RX ADMIN — Medication 20 MILLIGRAM(S): at 06:10

## 2023-07-22 RX ADMIN — Medication 10 MILLIGRAM(S): at 15:12

## 2023-07-22 RX ADMIN — Medication 650 MILLIGRAM(S): at 01:01

## 2023-07-22 RX ADMIN — Medication 25 MILLIGRAM(S): at 15:12

## 2023-07-22 RX ADMIN — Medication 50 MILLIEQUIVALENT(S): at 01:53

## 2023-07-22 RX ADMIN — ENOXAPARIN SODIUM 40 MILLIGRAM(S): 100 INJECTION SUBCUTANEOUS at 06:09

## 2023-07-22 RX ADMIN — Medication 25 MILLIGRAM(S): at 21:20

## 2023-07-22 RX ADMIN — MUPIROCIN 1 APPLICATION(S): 20 OINTMENT TOPICAL at 06:09

## 2023-07-22 RX ADMIN — Medication 25 MILLIGRAM(S): at 06:10

## 2023-07-22 RX ADMIN — Medication 650 MILLIGRAM(S): at 06:09

## 2023-07-22 RX ADMIN — CHLORHEXIDINE GLUCONATE 1 APPLICATION(S): 213 SOLUTION TOPICAL at 13:16

## 2023-07-22 RX ADMIN — Medication 650 MILLIGRAM(S): at 12:49

## 2023-07-22 RX ADMIN — Medication 650 MILLIGRAM(S): at 00:24

## 2023-07-22 RX ADMIN — Medication 650 MILLIGRAM(S): at 06:18

## 2023-07-22 RX ADMIN — AMLODIPINE BESYLATE 5 MILLIGRAM(S): 2.5 TABLET ORAL at 06:10

## 2023-07-22 RX ADMIN — INSULIN GLARGINE 20 UNIT(S): 100 INJECTION, SOLUTION SUBCUTANEOUS at 21:20

## 2023-07-22 RX ADMIN — Medication 500 MILLIGRAM(S): at 06:10

## 2023-07-22 RX ADMIN — POLYETHYLENE GLYCOL 3350 17 GRAM(S): 17 POWDER, FOR SOLUTION ORAL at 12:49

## 2023-07-22 RX ADMIN — ONDANSETRON 4 MILLIGRAM(S): 8 TABLET, FILM COATED ORAL at 06:09

## 2023-07-22 RX ADMIN — Medication 500 MILLIGRAM(S): at 17:25

## 2023-07-22 RX ADMIN — PANTOPRAZOLE SODIUM 40 MILLIGRAM(S): 20 TABLET, DELAYED RELEASE ORAL at 06:09

## 2023-07-22 RX ADMIN — INSULIN HUMAN 3 UNIT(S)/HR: 100 INJECTION, SOLUTION SUBCUTANEOUS at 13:18

## 2023-07-22 RX ADMIN — MUPIROCIN 1 APPLICATION(S): 20 OINTMENT TOPICAL at 17:25

## 2023-07-22 RX ADMIN — Medication 81 MILLIGRAM(S): at 12:49

## 2023-07-22 RX ADMIN — Medication 650 MILLIGRAM(S): at 13:55

## 2023-07-22 RX ADMIN — ATORVASTATIN CALCIUM 80 MILLIGRAM(S): 80 TABLET, FILM COATED ORAL at 21:20

## 2023-07-22 RX ADMIN — Medication 50 MILLIEQUIVALENT(S): at 01:04

## 2023-07-22 RX ADMIN — Medication 10 MILLIGRAM(S): at 21:21

## 2023-07-22 NOTE — PROGRESS NOTE ADULT - ASSESSMENT
Assessment  DMT2: 67y Male with DM T2 with hyperglycemia, A1C 7%, postop IV insulin, extubated and  diet advanced. AMS not eating full meals consistently. Neurology work up.     Will need tight glycemic control for postop wound healing.   CAD: on medications, stable, monitored. s/p CABG  ?Hypothyroidism: Elevated TSH 9, Free thyroxine levels not available, pending, no hx of thyroid disease.   HTN: on antihypertensive medications, monitored, asymptomatic.  HLD: on high intensity statin      Discussed plan and management with Attending   Addi Pelayo NP - TEAMS  Dr Yonatan Zapata  [phone]

## 2023-07-22 NOTE — PROGRESS NOTE ADULT - ASSESSMENT
67-year-old male with NIDDM2, HTN, HLD, asthma, CPAP on LUCRECIA, sent to the ED by Dr. Hart/cardio  for work-up of EKG abnormalities s/p CABG now.  neuro called for AMS post op   Cardiac cath done on 7/14/23 showed - LHC: pLAD 90%, D1 95%, mLCx 90%, mRCA 90%, LVEDP 15   now s/p CABG   A1c 7 LDL 57  CD 7/18/23 no significant hemodynamic significant stenosis   Tmax 100.2   NIHSS 2 premrs 0    Impression:   AMS of unclear etiology but non focal exam. possible toxic metabolic in nature     - can check CTH  - r/o infection/ Tmax 100.4;   - c/w asa and statin therapy  - unable for MRI at present  - if no improvement will considser further workup with rEEG in future  ; can defer for now   - telemetry  - PT/OT   - check FS, glucose control <180  - GI/DVT ppx- Thank you for allowing me to participate in the care of this patient. Call with questions.   Donavan Gonzalez MD  Vascular Neurology  Office: [phone]

## 2023-07-22 NOTE — PROGRESS NOTE ADULT - SUBJECTIVE AND OBJECTIVE BOX
Patient seen and examined at the bedside.    Remained critically ill on continuous ICU monitoring.    OBJECTIVE:  Vital Signs Last 24 Hrs  T(C): 37.7 (22 Jul 2023 08:00), Max: 38 (21 Jul 2023 16:00)  T(F): 99.9 (22 Jul 2023 08:00), Max: 100.4 (21 Jul 2023 16:00)  HR: 82 (22 Jul 2023 08:00) (81 - 93)  BP: 117/70 (22 Jul 2023 08:00) (117/70 - 149/72)  BP(mean): 87 (22 Jul 2023 08:00) (87 - 102)  RR: 14 (22 Jul 2023 08:00) (10 - 26)  SpO2: 99% (22 Jul 2023 08:00) (93% - 100%)    Parameters below as of 22 Jul 2023 04:00  Patient On (Oxygen Delivery Method): nasal cannula  O2 Flow (L/min): 6        Physical Exam:   General: Normal body habitus   Neurology: lethargic but arousable. Slow to respond to questions with some word finding difficulty, but non-focal--will move all extremities with strong equal strength   Eyes: bilateral pupils equal and reactive   ENT/Neck: Neck supple, trachea midline, No JVD   Respiratory: Dec in the bases   CV: S1S2, no murmurs        [x] Sternal dressing, [x] Mediastinal CT x2, [x] Pleural CT x2        [x] Sinus Rhythm - TPM VVI 50 ; A/V wires on VVI backup   Abdominal: Soft, NT, ND +BS  Extremities: trace pedal edema noted, + peripheral pulses   Skin: No Rashes, Hematoma, Ecchymosis                             Assessment:  66 y/o M w PMHx of NIDDM2, HTN, HLD, asthma, CPAP on LUCRECIA, sent to the ED by Dr. Hart for work-up of EKG abnormalities, now CAD s/p CABG 07/19/23    Hemodynamic instability  Hypovolemia  Post op respiratory insufficiency  Acute blood loss anemia  Thrombocytopenia  Diabetes Mellitus     Plan:   ***Neuro***  [x] Nonfocal  Post operative neuro assessment--patient with increasing word finding but non focal neuro exam yesterday am-> improved yesterday afternoon A&Ox3. Holding narcotics  keeping BP higher  Tylenol and PRN Oxycodone for pain management.   Neuro consulted, if change in status can get CTH noncon    ***Cardiovascular***  Invasive hemodynamic monitoring, assess perfusion indices   SR / CVP 0 /  / Hct 27.9% / Lactate 1.0  Continuous reassessment of hemodynamics   Beta blocker and Norvasc for BP Management   Allowing mild hypertensive pressures for inc brain perfusion   Monitor chest tube outputs   [x] VTE ppx with Lovenox  [x]  ASA [x] Statin   Serial EKG   Will remove chest tubes as tolerated    ***Pulmonary***  [x] NC 6L, wean as tolerated  Encourage incentive spirometry, continue pulse ox monitoring, follow ABGs     ***GI***  [x]  Diet:  Regular  [x] Protonix   Bowel regimen with Miralax and Senna  Reglan for gut motility     ***Renal***  Continue to monitor I/Os, BUN/Creatinine.   Replete lytes PRN  Huertas present   Started on diuresis yesterday with 20 PO lasix    ***ID***  No active antibiotic coverage     ***Endocrine***  [x] DM2 : HbA1c 7.0%                - [x] Insulin gtt               - Need tight glycemic control to prevent wound infection.          Patient requires continuous monitoring with bedside rhythm monitoring, pulse oximetry monitoring, and continuous central venous and arterial pressure monitoring; and intermittent blood gas analysis. Care plan discussed with the ICU care team.   Patient remained critical, at risk for life threatening decompensation.    I have spent 30 minutes providing critical care management to this patient.    By signing my name below, I, Toshia Thomas, attest that this documentation has been prepared under the direction and in the presence of Romario Tom NP  Electronically signed: Jesus Farris, 07-22-23 @ 09:11    I, Romario Tom NP, personally performed the services described in this documentation. all medical record entries made by the jesus were at my direction and in my presence. I have reviewed the chart and agree that the record reflects my personal performance and is accurate and complete  Electronically signed: Romario Tom NP Patient seen and examined at the bedside.    Remained critically ill on continuous ICU monitoring.    OBJECTIVE:  Vital Signs Last 24 Hrs  T(C): 37.7 (22 Jul 2023 08:00), Max: 38 (21 Jul 2023 16:00)  T(F): 99.9 (22 Jul 2023 08:00), Max: 100.4 (21 Jul 2023 16:00)  HR: 82 (22 Jul 2023 08:00) (81 - 93)  BP: 117/70 (22 Jul 2023 08:00) (117/70 - 149/72)  BP(mean): 87 (22 Jul 2023 08:00) (87 - 102)  RR: 14 (22 Jul 2023 08:00) (10 - 26)  SpO2: 99% (22 Jul 2023 08:00) (93% - 100%)    Parameters below as of 22 Jul 2023 04:00  Patient On (Oxygen Delivery Method): nasal cannula  O2 Flow (L/min): 6        Physical Exam:   General: Normal body habitus   Neurology: lethargic but arousable. Slow to respond to questions with some word finding difficulty, but non-focal--will move all extremities with strong equal strength   Eyes: bilateral pupils equal and reactive   ENT/Neck: Neck supple, trachea midline, No JVD   Respiratory: Dec in the bases   CV: S1S2, no murmurs        [x] Sternal dressing, [x] Mediastinal CT x2, [x] Pleural CT x2        [x] Sinus Rhythm - TPM VVI 50 ; A/V wires on VVI backup   Abdominal: Soft, NT, ND +BS  Extremities: trace pedal edema noted, + peripheral pulses   Skin: No Rashes, Hematoma, Ecchymosis                             Assessment:  66 y/o M w PMHx of NIDDM2, HTN, HLD, asthma, CPAP on LUCRECIA, sent to the ED by Dr. Hart for work-up of EKG abnormalities, now CAD s/p CABG 07/19/23    Hemodynamic instability  Hypovolemia  Post op respiratory insufficiency  Acute blood loss anemia  Thrombocytopenia  Diabetes Mellitus     Plan:   ***Neuro***  [x] Nonfocal  Post operative neuro assessment--patient with increasing word finding but non focal neuro exam yesterday am-> improved yesterday afternoon A&Ox3. Holding narcotics  keeping BP higher  Candidate for Step-down trials  Tylenol and PRN Oxycodone for pain management.   Neuro consulted, if change in status can get CTH noncon    ***Cardiovascular***  Invasive hemodynamic monitoring, assess perfusion indices   SR / CVP 0 /  / Hct 27.9% / Lactate 1.0  Continuous reassessment of hemodynamics   Beta blocker and Norvasc for BP Management   Allowing mild hypertensive pressures for inc brain perfusion   Monitor chest tube outputs -- will remove all CT and wires except L. Pleural  [x] VTE ppx with Lovenox  [x]  ASA [x] Statin   Serial EKG   Will remove chest tubes as tolerated    ***Pulmonary***  [x] NC 6L, wean as tolerated  Encourage incentive spirometry, continue pulse ox monitoring, follow ABGs     ***GI***  [x]  Diet:  Regular  [x] Protonix   Bowel regimen with Miralax and Senna  Reglan for gut motility     ***Renal***  Continue to monitor I/Os, BUN/Creatinine.   Replete lytes PRN  Huertas present   Continue diuresis with 20 PO lasix    ***ID***  No active antibiotic coverage     ***Endocrine***  [x] DM2 : HbA1c 7.0%                - [x] Insulin gtt  --> Starting LANTUS, plan to wean off Insulin gtt             - Need tight glycemic control to prevent wound infection.          Patient requires continuous monitoring with bedside rhythm monitoring, pulse oximetry monitoring, and continuous central venous and arterial pressure monitoring; and intermittent blood gas analysis. Care plan discussed with the ICU care team.   Patient remained critical, at risk for life threatening decompensation.    I have spent 30 minutes providing critical care management to this patient.    By signing my name below, I, Toshia Thomas, attest that this documentation has been prepared under the direction and in the presence of Romario Tom NP  Electronically signed: Jesus Farris, 07-22-23 @ 09:11    I, Romario Tom NP, personally performed the services described in this documentation. all medical record entries made by the jesus were at my direction and in my presence. I have reviewed the chart and agree that the record reflects my personal performance and is accurate and complete  Electronically signed: Romario Tom NP Patient seen and examined at the bedside.    Remained critically ill on continuous ICU monitoring.    OBJECTIVE:  Vital Signs Last 24 Hrs  T(C): 37.7 (22 Jul 2023 08:00), Max: 38 (21 Jul 2023 16:00)  T(F): 99.9 (22 Jul 2023 08:00), Max: 100.4 (21 Jul 2023 16:00)  HR: 82 (22 Jul 2023 08:00) (81 - 93)  BP: 117/70 (22 Jul 2023 08:00) (117/70 - 149/72)  BP(mean): 87 (22 Jul 2023 08:00) (87 - 102)  RR: 14 (22 Jul 2023 08:00) (10 - 26)  SpO2: 99% (22 Jul 2023 08:00) (93% - 100%)    Parameters below as of 22 Jul 2023 04:00  Patient On (Oxygen Delivery Method): nasal cannula  O2 Flow (L/min): 6        Physical Exam:   General: NAD  Neurology: Slow to respond to questions-> improving but non-focal--will move all extremities with strong equal strength   Eyes: bilateral pupils equal and reactive   ENT/Neck: Neck supple, trachea midline, No JVD   Respiratory: Dec in the bases   CV: S1S2, no murmurs        [x] Sternal dressing, [x] Mediastinal CT x2, [x] Pleural CT x2        [x] Sinus Rhythm - TPM VVI 50 ; A/V wires on VVI backup   Abdominal: Soft, NT, ND +BS  Extremities: trace pedal edema noted, + peripheral pulses   Skin: No Rashes, Hematoma, Ecchymosis                             Assessment:  66 y/o M w PMHx of NIDDM2, HTN, HLD, asthma, CPAP on LUCRECIA, sent to the ED by Dr. Hart for work-up of EKG abnormalities, now CAD s/p CABG 07/19/23    Hemodynamic instability  Hypovolemia  Post op respiratory insufficiency  Acute blood loss anemia  Thrombocytopenia  Diabetes Mellitus     Plan:   ***Neuro***  [x] Nonfocal  Post operative neuro assessment- improved yesterday afternoon A&Ox3. Holding narcotics  keeping BP higher  Tylenol and PRN Oxycodone for pain management.   Neuro consulted, if change in status can get CTH noncon    ***Cardiovascular***  Invasive hemodynamic monitoring, assess perfusion indices   SR / CVP 0 /  / Hct 27.9% / Lactate 1.0  Continuous reassessment of hemodynamics   Beta blocker and Norvasc for BP Management   Allowing mild hypertensive pressures for inc brain perfusion   Monitor chest tube outputs -- will remove all CT and pacing wires except L. Pleural  [x] VTE ppx with Lovenox  [x]  ASA [x] Statin   Serial EKG       ***Pulmonary***  [x] NC 6L, wean as tolerated  Encourage incentive spirometry, continue pulse ox monitoring, follow ABGs     ***GI***  [x]  Diet:  Regular  [x] Protonix   Bowel regimen with Miralax and Senna  Reglan for gut motility     ***Renal***  Continue to monitor I/Os, BUN/Creatinine.   Replete lytes PRN  Huertas present   Continue diuresis with 20 PO lasix    ***ID***  No active antibiotic coverage     ***Endocrine***  [x] DM2 : HbA1c 7.0%                - [x] Insulin gtt  --> Starting LANTUS per endo recs, plan to wean off Insulin gtt tonight             - Need tight glycemic control to prevent wound infection.          Patient requires continuous monitoring with bedside rhythm monitoring, pulse oximetry monitoring, and continuous central venous and arterial pressure monitoring; and intermittent blood gas analysis. Care plan discussed with the ICU care team.   Patient remained critical, at risk for life threatening decompensation.    I have spent 50 minutes providing critical care management to this patient.    By signing my name below, I, Toshia Thomas, attest that this documentation has been prepared under the direction and in the presence of Romario Tom NP  Electronically signed: Jesus Farris, 07-22-23 @ 09:11    I, Romario Tom NP, personally performed the services described in this documentation. all medical record entries made by the jesus were at my direction and in my presence. I have reviewed the chart and agree that the record reflects my personal performance and is accurate and complete  Electronically signed: Romario Tom NP

## 2023-07-22 NOTE — PROGRESS NOTE ADULT - PROBLEM SELECTOR PLAN 1
Will continue current insulin regimen for now. Postop IV insulin  Can give basal insulin tonight 20 units and stop IV insulin 2 hours after if tolerating meals better to transition off IV insulin in the AM  Will continue monitoring FS, log, and glucose trends, will Follow up.  if start eating can start on 6 units Admelog insulin pre-each meal  Patient counseled for compliance with consistent low carb diet and exercise as tolerated outpatient.

## 2023-07-22 NOTE — PROGRESS NOTE ADULT - SUBJECTIVE AND OBJECTIVE BOX
Chief complaint  Patient is a 67y old  Male who presents with a chief complaint of 3 VCAD (22 Jul 2023 07:53)         Labs and Fingersticks  CAPILLARY BLOOD GLUCOSE  160 (22 Jul 2023 08:00)  140 (22 Jul 2023 06:00)  119 (22 Jul 2023 04:00)  112 (22 Jul 2023 03:00)  97 (22 Jul 2023 02:00)  85 (22 Jul 2023 01:00)  105 (22 Jul 2023 00:00)  139 (21 Jul 2023 23:00)  167 (21 Jul 2023 22:00)  193 (21 Jul 2023 21:00)  165 (21 Jul 2023 20:00)  146 (21 Jul 2023 18:00)  113 (21 Jul 2023 17:00)  90 (21 Jul 2023 16:00)  121 (21 Jul 2023 15:00)  165 (21 Jul 2023 14:00)  175 (21 Jul 2023 13:00)  103 (21 Jul 2023 12:00)  118 (21 Jul 2023 11:00)  122 (21 Jul 2023 10:00)      POCT Blood Glucose.: 160 mg/dL (22 Jul 2023 08:10)  POCT Blood Glucose.: 140 mg/dL (22 Jul 2023 05:57)  POCT Blood Glucose.: 119 mg/dL (22 Jul 2023 04:07)  POCT Blood Glucose.: 112 mg/dL (22 Jul 2023 03:17)  POCT Blood Glucose.: 96 mg/dL (22 Jul 2023 02:30)  POCT Blood Glucose.: 97 mg/dL (22 Jul 2023 01:47)  POCT Blood Glucose.: 85 mg/dL (22 Jul 2023 01:14)  POCT Blood Glucose.: 105 mg/dL (21 Jul 2023 23:59)  POCT Blood Glucose.: 139 mg/dL (21 Jul 2023 22:51)  POCT Blood Glucose.: 167 mg/dL (21 Jul 2023 22:04)  POCT Blood Glucose.: 193 mg/dL (21 Jul 2023 21:00)  POCT Blood Glucose.: 165 mg/dL (21 Jul 2023 20:00)  POCT Blood Glucose.: 146 mg/dL (21 Jul 2023 17:52)  POCT Blood Glucose.: 113 mg/dL (21 Jul 2023 16:25)  POCT Blood Glucose.: 90 mg/dL (21 Jul 2023 15:50)  POCT Blood Glucose.: 121 mg/dL (21 Jul 2023 14:51)  POCT Blood Glucose.: 165 mg/dL (21 Jul 2023 13:56)  POCT Blood Glucose.: 176 mg/dL (21 Jul 2023 13:08)  POCT Blood Glucose.: 103 mg/dL (21 Jul 2023 11:51)  POCT Blood Glucose.: 118 mg/dL (21 Jul 2023 10:55)  POCT Blood Glucose.: 122 mg/dL (21 Jul 2023 10:13)      Anion Gap: 10 (07-22 @ 00:56)  Anion Gap: 10 (07-21 @ 01:03)      Calcium: 8.5 (07-22 @ 00:56)  Calcium: 8.8 (07-21 @ 01:03)  Albumin: 3.2 *L* (07-22 @ 00:56)  Albumin: 3.4 (07-21 @ 01:03)    Alanine Aminotransferase (ALT/SGPT): 46 *H* (07-22 @ 00:56)  Alanine Aminotransferase (ALT/SGPT): 28 (07-21 @ 01:03)  Alkaline Phosphatase: 62 (07-22 @ 00:56)  Alkaline Phosphatase: 55 (07-21 @ 01:03)  Aspartate Aminotransferase (AST/SGOT): 44 *H* (07-22 @ 00:56)  Aspartate Aminotransferase (AST/SGOT): 37 (07-21 @ 01:03)        07-22    142  |  109<H>  |  24<H>  ----------------------------<  108<H>  3.5   |  23  |  0.92    Ca    8.5      22 Jul 2023 00:56  Phos  1.9     07-22  Mg     2.2     07-22    TPro  5.9<L>  /  Alb  3.2<L>  /  TBili  0.9  /  DBili  x   /  AST  44<H>  /  ALT  46<H>  /  AlkPhos  62  07-22                        8.9    9.52  )-----------( 141      ( 22 Jul 2023 00:56 )             27.9     Medications  MEDICATIONS  (STANDING):  acetaminophen     Tablet .. 650 milliGRAM(s) Oral every 6 hours  amLODIPine   Tablet 5 milliGRAM(s) Oral daily  ascorbic acid 500 milliGRAM(s) Oral two times a day  aspirin enteric coated 81 milliGRAM(s) Oral daily  atorvastatin 80 milliGRAM(s) Oral at bedtime  bisacodyl Suppository 10 milliGRAM(s) Rectal once  chlorhexidine 2% Cloths 1 Application(s) Topical daily  enoxaparin Injectable 40 milliGRAM(s) SubCutaneous every 24 hours  furosemide    Tablet 20 milliGRAM(s) Oral daily  insulin regular Infusion 3 Unit(s)/Hr (3 mL/Hr) IV Continuous <Continuous>  metoclopramide Injectable 10 milliGRAM(s) IV Push every 8 hours  metoprolol tartrate 25 milliGRAM(s) Oral every 8 hours  mupirocin 2% Ointment 1 Application(s) Both Nostrils two times a day  pantoprazole    Tablet 40 milliGRAM(s) Oral before breakfast  polyethylene glycol 3350 17 Gram(s) Oral daily  senna 2 Tablet(s) Oral at bedtime  sodium chloride 0.9%. 1000 milliLiter(s) (10 mL/Hr) IV Continuous <Continuous>      Physical Exam  General: Patient comfortable in bed   Vital Signs Last 12 Hrs  T(F): 99.9 (07-22-23 @ 08:00), Max: 99.9 (07-22-23 @ 08:00)  HR: 82 (07-22-23 @ 08:00) (81 - 91)  BP: 117/70 (07-22-23 @ 08:00) (117/70 - 149/72)  BP(mean): 87 (07-22-23 @ 08:00) (87 - 102)  RR: 14 (07-22-23 @ 08:00) (14 - 26)  SpO2: 99% (07-22-23 @ 08:00) (94% - 100%)    CVS: S1S2   Respiratory: No wheezing, no crepitations  GI: Abdomen soft, bowel sounds positive  Musculoskeletal:  moves all extremities  : Voiding

## 2023-07-23 LAB
ALBUMIN SERPL ELPH-MCNC: 3.2 G/DL — LOW (ref 3.3–5)
ALP SERPL-CCNC: 69 U/L — SIGNIFICANT CHANGE UP (ref 40–120)
ALT FLD-CCNC: 51 U/L — HIGH (ref 10–45)
ANION GAP SERPL CALC-SCNC: 11 MMOL/L — SIGNIFICANT CHANGE UP (ref 5–17)
AST SERPL-CCNC: 35 U/L — SIGNIFICANT CHANGE UP (ref 10–40)
BASOPHILS # BLD AUTO: 0.02 K/UL — SIGNIFICANT CHANGE UP (ref 0–0.2)
BASOPHILS NFR BLD AUTO: 0.3 % — SIGNIFICANT CHANGE UP (ref 0–2)
BILIRUB SERPL-MCNC: 0.7 MG/DL — SIGNIFICANT CHANGE UP (ref 0.2–1.2)
BUN SERPL-MCNC: 28 MG/DL — HIGH (ref 7–23)
CALCIUM SERPL-MCNC: 8.4 MG/DL — SIGNIFICANT CHANGE UP (ref 8.4–10.5)
CHLORIDE SERPL-SCNC: 109 MMOL/L — HIGH (ref 96–108)
CO2 SERPL-SCNC: 22 MMOL/L — SIGNIFICANT CHANGE UP (ref 22–31)
CREAT SERPL-MCNC: 1.01 MG/DL — SIGNIFICANT CHANGE UP (ref 0.5–1.3)
EGFR: 83 ML/MIN/1.73M2 — SIGNIFICANT CHANGE UP
EOSINOPHIL # BLD AUTO: 0.07 K/UL — SIGNIFICANT CHANGE UP (ref 0–0.5)
EOSINOPHIL NFR BLD AUTO: 1 % — SIGNIFICANT CHANGE UP (ref 0–6)
GLUCOSE BLDC GLUCOMTR-MCNC: 136 MG/DL — HIGH (ref 70–99)
GLUCOSE BLDC GLUCOMTR-MCNC: 194 MG/DL — HIGH (ref 70–99)
GLUCOSE BLDC GLUCOMTR-MCNC: 224 MG/DL — HIGH (ref 70–99)
GLUCOSE BLDC GLUCOMTR-MCNC: 233 MG/DL — HIGH (ref 70–99)
GLUCOSE BLDC GLUCOMTR-MCNC: 339 MG/DL — HIGH (ref 70–99)
GLUCOSE SERPL-MCNC: 187 MG/DL — HIGH (ref 70–99)
HCT VFR BLD CALC: 25.9 % — LOW (ref 39–50)
HGB BLD-MCNC: 8.4 G/DL — LOW (ref 13–17)
IMM GRANULOCYTES NFR BLD AUTO: 0.6 % — SIGNIFICANT CHANGE UP (ref 0–0.9)
LYMPHOCYTES # BLD AUTO: 1.65 K/UL — SIGNIFICANT CHANGE UP (ref 1–3.3)
LYMPHOCYTES # BLD AUTO: 23.7 % — SIGNIFICANT CHANGE UP (ref 13–44)
MAGNESIUM SERPL-MCNC: 2.3 MG/DL — SIGNIFICANT CHANGE UP (ref 1.6–2.6)
MCHC RBC-ENTMCNC: 27.4 PG — SIGNIFICANT CHANGE UP (ref 27–34)
MCHC RBC-ENTMCNC: 32.4 GM/DL — SIGNIFICANT CHANGE UP (ref 32–36)
MCV RBC AUTO: 84.4 FL — SIGNIFICANT CHANGE UP (ref 80–100)
MONOCYTES # BLD AUTO: 0.63 K/UL — SIGNIFICANT CHANGE UP (ref 0–0.9)
MONOCYTES NFR BLD AUTO: 9.1 % — SIGNIFICANT CHANGE UP (ref 2–14)
NEUTROPHILS # BLD AUTO: 4.55 K/UL — SIGNIFICANT CHANGE UP (ref 1.8–7.4)
NEUTROPHILS NFR BLD AUTO: 65.3 % — SIGNIFICANT CHANGE UP (ref 43–77)
NRBC # BLD: 0 /100 WBCS — SIGNIFICANT CHANGE UP (ref 0–0)
PHOSPHATE SERPL-MCNC: 2.3 MG/DL — LOW (ref 2.5–4.5)
PLATELET # BLD AUTO: 144 K/UL — LOW (ref 150–400)
POTASSIUM SERPL-MCNC: 3.8 MMOL/L — SIGNIFICANT CHANGE UP (ref 3.5–5.3)
POTASSIUM SERPL-SCNC: 3.8 MMOL/L — SIGNIFICANT CHANGE UP (ref 3.5–5.3)
PROT SERPL-MCNC: 5.6 G/DL — LOW (ref 6–8.3)
RBC # BLD: 3.07 M/UL — LOW (ref 4.2–5.8)
RBC # FLD: 15.9 % — HIGH (ref 10.3–14.5)
SODIUM SERPL-SCNC: 142 MMOL/L — SIGNIFICANT CHANGE UP (ref 135–145)
WBC # BLD: 6.96 K/UL — SIGNIFICANT CHANGE UP (ref 3.8–10.5)
WBC # FLD AUTO: 6.96 K/UL — SIGNIFICANT CHANGE UP (ref 3.8–10.5)

## 2023-07-23 PROCEDURE — 71045 X-RAY EXAM CHEST 1 VIEW: CPT | Mod: 26

## 2023-07-23 PROCEDURE — 71045 X-RAY EXAM CHEST 1 VIEW: CPT | Mod: 26,77

## 2023-07-23 RX ORDER — INSULIN LISPRO 100/ML
10 VIAL (ML) SUBCUTANEOUS ONCE
Refills: 0 | Status: COMPLETED | OUTPATIENT
Start: 2023-07-23 | End: 2023-07-23

## 2023-07-23 RX ORDER — POTASSIUM CHLORIDE 20 MEQ
40 PACKET (EA) ORAL ONCE
Refills: 0 | Status: COMPLETED | OUTPATIENT
Start: 2023-07-23 | End: 2023-07-23

## 2023-07-23 RX ORDER — MAGNESIUM SULFATE 500 MG/ML
2 VIAL (ML) INJECTION ONCE
Refills: 0 | Status: COMPLETED | OUTPATIENT
Start: 2023-07-23 | End: 2023-07-23

## 2023-07-23 RX ORDER — COLCHICINE 0.6 MG
0.6 TABLET ORAL DAILY
Refills: 0 | Status: DISCONTINUED | OUTPATIENT
Start: 2023-07-23 | End: 2023-07-25

## 2023-07-23 RX ORDER — INSULIN LISPRO 100/ML
10 VIAL (ML) SUBCUTANEOUS
Refills: 0 | Status: DISCONTINUED | OUTPATIENT
Start: 2023-07-23 | End: 2023-07-24

## 2023-07-23 RX ORDER — INSULIN GLARGINE 100 [IU]/ML
24 INJECTION, SOLUTION SUBCUTANEOUS AT BEDTIME
Refills: 0 | Status: DISCONTINUED | OUTPATIENT
Start: 2023-07-23 | End: 2023-07-24

## 2023-07-23 RX ADMIN — ATORVASTATIN CALCIUM 80 MILLIGRAM(S): 80 TABLET, FILM COATED ORAL at 21:09

## 2023-07-23 RX ADMIN — OXYCODONE HYDROCHLORIDE 5 MILLIGRAM(S): 5 TABLET ORAL at 14:48

## 2023-07-23 RX ADMIN — Medication 25 MILLIGRAM(S): at 21:09

## 2023-07-23 RX ADMIN — Medication 10 UNIT(S): at 17:05

## 2023-07-23 RX ADMIN — MUPIROCIN 1 APPLICATION(S): 20 OINTMENT TOPICAL at 05:06

## 2023-07-23 RX ADMIN — Medication 63.75 MILLIMOLE(S): at 02:32

## 2023-07-23 RX ADMIN — Medication 25 MILLIGRAM(S): at 14:21

## 2023-07-23 RX ADMIN — Medication 500 MILLIGRAM(S): at 17:06

## 2023-07-23 RX ADMIN — OXYCODONE HYDROCHLORIDE 5 MILLIGRAM(S): 5 TABLET ORAL at 14:18

## 2023-07-23 RX ADMIN — Medication 0.6 MILLIGRAM(S): at 12:01

## 2023-07-23 RX ADMIN — ENOXAPARIN SODIUM 40 MILLIGRAM(S): 100 INJECTION SUBCUTANEOUS at 05:41

## 2023-07-23 RX ADMIN — Medication 10 UNIT(S): at 12:02

## 2023-07-23 RX ADMIN — Medication 40 MILLIEQUIVALENT(S): at 02:29

## 2023-07-23 RX ADMIN — AMLODIPINE BESYLATE 5 MILLIGRAM(S): 2.5 TABLET ORAL at 05:03

## 2023-07-23 RX ADMIN — Medication 4: at 12:01

## 2023-07-23 RX ADMIN — Medication 10 MILLIGRAM(S): at 14:21

## 2023-07-23 RX ADMIN — Medication 500 MILLIGRAM(S): at 05:03

## 2023-07-23 RX ADMIN — Medication 81 MILLIGRAM(S): at 12:02

## 2023-07-23 RX ADMIN — Medication 6 UNIT(S): at 10:09

## 2023-07-23 RX ADMIN — Medication 150 GRAM(S): at 02:32

## 2023-07-23 RX ADMIN — PANTOPRAZOLE SODIUM 40 MILLIGRAM(S): 20 TABLET, DELAYED RELEASE ORAL at 05:06

## 2023-07-23 RX ADMIN — Medication 20 MILLIGRAM(S): at 05:03

## 2023-07-23 RX ADMIN — Medication 10 MILLIGRAM(S): at 21:09

## 2023-07-23 RX ADMIN — Medication 2: at 21:08

## 2023-07-23 RX ADMIN — Medication 10 MILLIGRAM(S): at 05:04

## 2023-07-23 RX ADMIN — INSULIN GLARGINE 24 UNIT(S): 100 INJECTION, SOLUTION SUBCUTANEOUS at 21:08

## 2023-07-23 RX ADMIN — CHLORHEXIDINE GLUCONATE 1 APPLICATION(S): 213 SOLUTION TOPICAL at 17:01

## 2023-07-23 RX ADMIN — Medication 25 MILLIGRAM(S): at 05:03

## 2023-07-23 RX ADMIN — Medication 2: at 10:09

## 2023-07-23 RX ADMIN — SENNA PLUS 2 TABLET(S): 8.6 TABLET ORAL at 21:12

## 2023-07-23 NOTE — PROGRESS NOTE ADULT - ASSESSMENT
Assessment  DMT2: 67y Male with DM T2 with hyperglycemia, A1C 7%, postop IV insulin, extubated and  diet advanced. AMS not eating full meals consistently. Neurology work up.     Will need tight glycemic control for postop wound healing.   CAD: on medications, stable, monitored. s/p CABG  ?Hypothyroidism: Elevated TSH 9, Free thyroxine levels not available, pending, no hx of thyroid disease.   HTN: on antihypertensive medications, monitored, asymptomatic.  HLD: on high intensity statin      Discussed plan and management with Attending   Addi Pelayo NP - TEAMS  Dr Yonatan Zapata  [phone]

## 2023-07-23 NOTE — PROGRESS NOTE ADULT - SUBJECTIVE AND OBJECTIVE BOX
VITAL SIGNS     sr 80  Telemetry:      Vital Signs Last 24 Hrs  T(C): 36.8 (23 @ 11:52), Max: 37.1 (23 @ 04:00)  T(F): 98.2 (23 @ 11:52), Max: 98.7 (23 @ 04:00)  HR: 92 (23 @ 11:52) (75 - 92)  BP: 132/72 (23 @ 11:52) (104/58 - 146/80)  RR: 18 (23 @ 11:52) (16 - 26)  SpO2: 93% (23 @ 11:52) (92% - 99%)                   Daily     Daily Weight in k.1 (2023 00:00)      Bilirubin Total: 0.7 mg/dL ( @ 00:31)    CAPILLARY BLOOD GLUCOSE      POCT Blood Glucose.: 339 mg/dL (2023 11:11)  POCT Blood Glucose.: 233 mg/dL (2023 10:06)  POCT Blood Glucose.: 194 mg/dL (2023 07:48)  POCT Blood Glucose.: 217 mg/dL (2023 21:15)  POCT Blood Glucose.: 126 mg/dL (2023 18:53)  POCT Blood Glucose.: 183 mg/dL (2023 17:22)  POCT Blood Glucose.: 121 mg/dL (2023 16:08)  POCT Blood Glucose.: 153 mg/dL (2023 14:43)          Drains:        L Pleural  [  ]  Drainage:                           PHYSICAL EXAM    Neurology: alert and oriented x 3, moves all extremities with no defecits  CV :  RRR  Sternal Wound :  CDI , Stable  Lungs:   CTA B/L  Abdomen: soft, nontender, nondistended, positive bowel sounds,  Extremities:     bl    trace pedal edema

## 2023-07-23 NOTE — PROGRESS NOTE ADULT - ASSESSMENT
67-year-old male with NIDDM2, HTN, HLD, asthma, CPAP on LUCRECIA, sent to the ED by Dr. Hart for work-up of EKG abnormalities. pain is described as a pressure over left and right anterior chest wall, radiating to R shoulder, 10/10 in severity, lasting 15-30 minutes at a time, worse with exertion, associated with diaphoresis and without any associated nausea, shortness of breath or palpitations.   Cardiac cath done on 7/14/23 showed - LHC: pLAD 90%, D1 95%, mLCx 90%, mRCA 90%, LVEDP 15, Transferred to [facility] for CABg eval w/ Dr Hernandez  7/19   CABG  post op High flow,       with CPAP,   post op anemia  insulin gtt  7/23    2 yu  lt pl chest tube   NSR  lop 25 q8   colchicine for pl eff   hyperglycemic  with endo following with insulin adjusted

## 2023-07-23 NOTE — PROGRESS NOTE ADULT - SUBJECTIVE AND OBJECTIVE BOX
Neurology Progress Note    S: Patient seen and examined transferred to floor. doing okay     Medication:  MEDICATIONS  (STANDING):  amLODIPine   Tablet 5 milliGRAM(s) Oral daily  ascorbic acid 500 milliGRAM(s) Oral two times a day  aspirin enteric coated 81 milliGRAM(s) Oral daily  atorvastatin 80 milliGRAM(s) Oral at bedtime  bisacodyl Suppository 10 milliGRAM(s) Rectal once  chlorhexidine 2% Cloths 1 Application(s) Topical daily  enoxaparin Injectable 40 milliGRAM(s) SubCutaneous every 24 hours  furosemide    Tablet 20 milliGRAM(s) Oral daily  insulin glargine Injectable (LANTUS) 24 Unit(s) SubCutaneous at bedtime  insulin lispro (ADMELOG) corrective regimen sliding scale   SubCutaneous Before meals and at bedtime  insulin lispro Injectable (ADMELOG) 6 Unit(s) SubCutaneous three times a day before meals  insulin regular Infusion 3 Unit(s)/Hr (3 mL/Hr) IV Continuous <Continuous>  metoclopramide Injectable 10 milliGRAM(s) IV Push every 8 hours  metoprolol tartrate 25 milliGRAM(s) Oral every 8 hours  pantoprazole    Tablet 40 milliGRAM(s) Oral before breakfast  polyethylene glycol 3350 17 Gram(s) Oral daily  senna 2 Tablet(s) Oral at bedtime  sodium chloride 0.9%. 1000 milliLiter(s) (10 mL/Hr) IV Continuous <Continuous>    MEDICATIONS  (PRN):  acetaminophen     Tablet .. 650 milliGRAM(s) Oral every 6 hours PRN Mild Pain (1 - 3)  oxyCODONE    IR 5 milliGRAM(s) Oral every 4 hours PRN Moderate Pain (4 - 6)  oxyCODONE    IR 10 milliGRAM(s) Oral every 4 hours PRN Severe Pain (7 - 10)    Vitals:  Vital Signs Last 24 Hrs  T(C): 36.6 (07-23-23 @ 06:23), Max: 37.6 (07-22-23 @ 12:00)  T(F): 97.9 (07-23-23 @ 06:23), Max: 99.7 (07-22-23 @ 12:00)  HR: 77 (07-23-23 @ 06:23) (75 - 86)  BP: 131/82 (07-23-23 @ 06:23) (101/61 - 142/73)  BP(mean): 98 (07-23-23 @ 06:23) (75 - 100)  RR: 18 (07-23-23 @ 06:23) (16 - 26)  SpO2: 99% (07-23-23 @ 06:23) (91% - 99%)        General Exam:   General Appearance: Appropriately dressed and in no acute distress       Head: Normocephalic, atraumatic and no dysmorphic features  Ear, Nose, and Throat: Moist mucous membranes  CVS: S1S2+  Resp: No SOB, no wheeze or rhonchi  GI: soft NT/ND  Extremities: No edema or cyanosis  Skin: No bruises or rashes     Neurological Exam:  Mental Status: Awake, alert and oriented x 2.  Able to follow simple verbal commands. Able to name and repeat. fluent speech. No obvious aphasia minimal dysarthria noted.   Cranial Nerves: PERRL, EOMI, VFFC, sensation V1-V3 intact,  no obvious facial asymmetry, equal elevation of palate, scm/trap 5/5, tongue is midline on protrusion. no obvious papilledema on fundoscopic exam. hearing is grossly intact.   Motor: Normal bulk, tone and strength throughout. Fine finger movements were intact and symmetric. no tremors or drift noted.    Sensation: Intact to light touch and pinprick throughout. no right/left confusion. no extinction to tactile on DSS.    Reflexes: 1+ throughout at biceps, brachioradialis, triceps, patellars and ankles bilaterally and equal. No clonus. R toe and L toe were both downgoing.  Coordination: No dysmetria on FNF   Gait: walked on unit yestesrday    I personally reviewed the below data/images/labs:    CBC Full  -  ( 23 Jul 2023 00:29 )  WBC Count : 6.96 K/uL  RBC Count : 3.07 M/uL  Hemoglobin : 8.4 g/dL  Hematocrit : 25.9 %  Platelet Count - Automated : 144 K/uL  Mean Cell Volume : 84.4 fl  Mean Cell Hemoglobin : 27.4 pg  Mean Cell Hemoglobin Concentration : 32.4 gm/dL  Auto Neutrophil # : 4.55 K/uL  Auto Lymphocyte # : 1.65 K/uL  Auto Monocyte # : 0.63 K/uL  Auto Eosinophil # : 0.07 K/uL  Auto Basophil # : 0.02 K/uL  Auto Neutrophil % : 65.3 %  Auto Lymphocyte % : 23.7 %  Auto Monocyte % : 9.1 %  Auto Eosinophil % : 1.0 %  Auto Basophil % : 0.3 %    07-23    142  |  109<H>  |  28<H>  ----------------------------<  187<H>  3.8   |  22  |  1.01    Ca    8.4      23 Jul 2023 00:31  Phos  2.3     07-23  Mg     2.3     07-23    TPro  5.6<L>  /  Alb  3.2<L>  /  TBili  0.7  /  DBili  x   /  AST  35  /  ALT  51<H>  /  AlkPhos  69  07-23      Urinalysis Basic - ( 22 Jul 2023 00:56 )    Color: x / Appearance: x / SG: x / pH: x  Gluc: 108 mg/dL / Ketone: x  / Bili: x / Urobili: x   Blood: x / Protein: x / Nitrite: x   Leuk Esterase: x / RBC: x / WBC x   Sq Epi: x / Non Sq Epi: x / Bacteria: x        < from: VA Duplex Carotid, Bilat (07.18.23 @ 10:17) >    ACC: [id number] EXAM:  CAROTID DUPLEX BILATERAL   ORDERED BY: MANSI WILEY     PROCEDURE DATE:  07/18/2023          INTERPRETATION:  CLINICAL INFORMATION: Coronary artery disease, evaluate   for carotid artery disease    COMPARISON: None available.    TECHNIQUE: Grayscale, color and spectral Doppler examination of both   carotid arteries was performed.    FINDINGS:    There is increased tortuosity to the right and the left carotid arteries   in the neck.    Mild atheromatous plaque is present along the course of the carotid   arteries in the neck.    No elevated velocities or abnormal waveforms are encountered.    Peak systolic velocities are as follows:    RIGHT:  PROX CCA = 110  DIST CCA = 55  PROX ICA = 71  DIST ICA = 58  ECA = 115    LEFT:  PROX CCA = 95  DIST CCA = 62  PROX ICA = 79  DIST ICA = 69  ECA = 77    Antegrade flow is noted within both vertebral arteries.    IMPRESSION: No significant hemodynamic stenosis of either carotid artery.    Measurement of carotid stenosis is based on velocity parameters that   correlate the residual internal carotid diameter with that of the more   distal vessel in accordance with a method such as the [place] [employer]   Symptomatic Carotid Endarterectomy Trial (NASCET).    --- End of Report ---            ISAIAS CHIU MD; Attending Radiologist  This document has been electronically signed. Jul 18 2023 10:46AM    < end of copied text >

## 2023-07-23 NOTE — PROGRESS NOTE ADULT - PROBLEM SELECTOR PLAN 1
Will increase Lantus to 24 u at bedtime.  Will increase Admelog to 10 u before each meal and continue Admelog correction scale coverage.   Will continue monitoring FS, log, and glucose trends, will Follow up.  Patient counseled for compliance with consistent low carb diet and exercise as tolerated outpatient.

## 2023-07-23 NOTE — PROGRESS NOTE ADULT - ASSESSMENT
_________________________________________________________________________________________  ========>>  M E D I C A L   A T T E N D I N G    F O L L O W  U P  N O T E  <<=========  -----------------------------------------------------------------------------------------------------    - Patient seen and examined by me earlier today.  patient recovering well post CABG  - In summary,  GILA SHAH is a 67y year old man admitted with CP / CAD   - Patient doing ok post op CABG X5! comfortable, in chair today, ambulating , passed voiding trial, out of CTICU, eating well, + BM, pain improved ( a little pain in back only )     ==================>> REVIEW OF SYSTEM <<=================    GEN: no fever, no chills, as above   RESP: no SOB, no cough, no sputum  CVS:  chest pain, no palpitations, no edema  GI: no abdominal pain, no nausea, no vomiting  : no dysuria, no issues otherwise   NEURO: no headache, no dizziness  PSYCH: no depression, not anxious  Derm : no itching, no rash    ==================>> PHYSICAL EXAM <<=================    GEN: Alert, Oriented, in chair, NAD , comfortable in chair,,   HEENT: NCAT, MMM, PERRL  Neck: supple , central line in place   CVS: S1S2 , regular   PULM: CTA B/L,  limited ... chest tubes in place X1  ABD.: soft. non tender, non distended  Extrem: no significant edema, hernández out   neuro-Psych: alert, in good spirits                              ( Note written / Date of service 07-23-23 )    ==================>> MEDICATIONS <<====================    amLODIPine   Tablet 5 milliGRAM(s) Oral daily  ascorbic acid 500 milliGRAM(s) Oral two times a day  aspirin enteric coated 81 milliGRAM(s) Oral daily  atorvastatin 80 milliGRAM(s) Oral at bedtime  bisacodyl Suppository 10 milliGRAM(s) Rectal once  chlorhexidine 2% Cloths 1 Application(s) Topical daily  colchicine 0.6 milliGRAM(s) Oral daily  enoxaparin Injectable 40 milliGRAM(s) SubCutaneous every 24 hours  furosemide    Tablet 20 milliGRAM(s) Oral daily  insulin glargine Injectable (LANTUS) 24 Unit(s) SubCutaneous at bedtime  insulin lispro (ADMELOG) corrective regimen sliding scale   SubCutaneous Before meals and at bedtime  insulin lispro Injectable (ADMELOG) 10 Unit(s) SubCutaneous three times a day before meals  insulin regular Infusion 3 Unit(s)/Hr IV Continuous <Continuous>  metoclopramide Injectable 10 milliGRAM(s) IV Push every 8 hours  metoprolol tartrate 25 milliGRAM(s) Oral every 8 hours  pantoprazole    Tablet 40 milliGRAM(s) Oral before breakfast  polyethylene glycol 3350 17 Gram(s) Oral daily  senna 2 Tablet(s) Oral at bedtime  sodium chloride 0.9%. 1000 milliLiter(s) IV Continuous <Continuous>    MEDICATIONS  (PRN):  acetaminophen     Tablet .. 650 milliGRAM(s) Oral every 6 hours PRN Mild Pain (1 - 3)  oxyCODONE    IR 5 milliGRAM(s) Oral every 4 hours PRN Moderate Pain (4 - 6)  oxyCODONE    IR 10 milliGRAM(s) Oral every 4 hours PRN Severe Pain (7 - 10)    ___________  Active diet:  Diet, Regular:   Consistent Carbohydrate No Snacks (CSTCHO)  ___________________    ==================>> VITAL SIGNS <<==================    Vital Signs Last 24 HrsT(C): 36.8 (07-23-23 @ 11:52)  T(F): 98.2 (07-23-23 @ 11:52), Max: 98.7 (07-23-23 @ 04:00)  HR: 92 (07-23-23 @ 11:52) (75 - 92)  BP: 132/72 (07-23-23 @ 11:52)  RR: 18 (07-23-23 @ 11:52) (16 - 26)  SpO2: 93% (07-23-23 @ 11:52) (91% - 99%)      CAPILLARY BLOOD GLUCOSE      POCT Blood Glucose.: 339 mg/dL (23 Jul 2023 11:11)  POCT Blood Glucose.: 233 mg/dL (23 Jul 2023 10:06)  POCT Blood Glucose.: 194 mg/dL (23 Jul 2023 07:48)  POCT Blood Glucose.: 217 mg/dL (22 Jul 2023 21:15)  POCT Blood Glucose.: 126 mg/dL (22 Jul 2023 18:53)  POCT Blood Glucose.: 183 mg/dL (22 Jul 2023 17:22)  POCT Blood Glucose.: 121 mg/dL (22 Jul 2023 16:08)  POCT Blood Glucose.: 153 mg/dL (22 Jul 2023 14:43)  POCT Blood Glucose.: 173 mg/dL (22 Jul 2023 12:53)     ==================>> LAB AND IMAGING <<==================                        8.4    6.96  )-----------( 144      ( 23 Jul 2023 00:29 )             25.9        07-23    142  |  109<H>  |  28<H>  ----------------------------<  187<H>  3.8   |  22  |  1.01    Ca    8.4      23 Jul 2023 00:31  Phos  2.3     07-23  Mg     2.3     07-23    TPro  5.6<L>  /  Alb  3.2<L>  /  TBili  0.7  /  DBili  x   /  AST  35  /  ALT  51<H>  /  AlkPhos  69  07-23    WBC count:   6.96 <<== ,  9.52 <<== ,  9.88 <<== ,  6.45 <<== ,  7.31 <<==   Hemoglobin:   8.4 <<==,  8.9 <<==,  9.5 <<==,  9.0 <<==,  10.1 <<==  platelets:  144 <==, 141 <==, 159 <==, 159 <==, 155 <==, 182 <==    Creatinine:  1.01  <<==, 0.92  <<==, 0.97  <<==, 1.18  <<==, 0.96  <<==, 1.07  <<==  Sodium:   142  <==, 142  <==, 143  <==, 146  <==, 147  <==, 137  <==       AST:          35 <== , 44 <== , 37 <== , 48 <== , 59 <==      ALT:        51  <== , 46  <== , 28  <== , 27  <== , 31  <==      AP:        69  <=, 62  <=, 55  <=, 51  <=, 59  <=     Bili:        0.7  <=, 0.9  <=, 0.7  <=, 1.1  <=, 1.4  <=      < from: TTE W or WO Ultrasound Enhancing Agent (07.18.23 @ 08:37) >  CONCLUSIONS:    1. Normal left ventricular cavity size. The left ventricular wall thickness is normal. The left ventricular systolic function is normal with an ejection fraction of 65 % by Black's method of disks. There are regional wall motion abnormalities No evidence of a thrombus in the left ventricle.   2. Mid inferolateral segment and mid anterolateral segment are abnormal.      Mid inferolateral segment and mid anterolateral segment are abnormal.   3. Normal atria.   4. Normal right ventricular cavity size, normal right ventricular wall thickness and normal right ventricular systolic function. The tricuspid annular plane systolic excursion (TAPSE) is 2.1 cm (normal >=1.7 cm).   5. No significant valvular disease.   6. No pericardial effusion seen.   7. No prior echocardiogram is available for comparison.  < end of copied text >    < from: Cardiac Catheterization (07.14.23 @ 09:51) >  Procedures:                 1.    Arterial Access - Right Radial   2.    Left Heart Cath   3.    Diagnostic Coronary Angiography   Primary ICD-10: I25.110 - Atherosclerotic heart disease of native  coronary artery with unstable angina pectoris   Secondary ICD-10   E11.59 - Type 2 diabetes mellitus with other circulatory complications  I10 - Essential (primary) hypertension     Diagnostic Conclusions:   Patient has severe triple vessel disease as described with progressive  symptoms of angin    Recommendations:   Revascularization with CABG. Transfer to [facility]   < end of copied text >    ___________________________________________________________________________________  ===============>>  A S S E S S M E N T   A N D   P L A N <<===============  ------------------------------------------------------------------------------------------    · Assessment	  67-year-old male with NIDDM2, HTN, HLD, asthma, CPAP on LUCRECIA, sent to the ED by Dr. Hart for work-up of EKG abnormalities.     Problem/Plan - 1:  ·  Problem: Chest pain / Angina in patient with severe CAD.    Patient status post CABG, doing overall well   Appreciated CT surgery care and management   monitor on tele  med optimization per cardiologist  Management of chest tubes as per surgery team  pain management as needed  supportive care   PT / oob as able  nutrition, PO hydration     Problem/Plan - 2:  ·  Problem: type 2 diabetes mellitus.   FS Q6H w/ISS.  monitor  FS closely asia- op  endocrine appreciated      Problem/Plan - 3:  ·  Problem: LUCRECIA on CPAP.   > would need CPAP Nightly     --------------------------------------------  Case discussed with patient,   ___________________________  Will follow with you  thank you  NOREEN Wilkinson D.O.  Pager: [phone]

## 2023-07-23 NOTE — PROGRESS NOTE ADULT - SUBJECTIVE AND OBJECTIVE BOX
Chief complaint  Patient is a 67y old  Male who presents with a chief complaint of 3 VCAD (23 Jul 2023 07:49)         Labs and Fingersticks  CAPILLARY BLOOD GLUCOSE      POCT Blood Glucose.: 339 mg/dL (23 Jul 2023 11:11)  POCT Blood Glucose.: 233 mg/dL (23 Jul 2023 10:06)  POCT Blood Glucose.: 194 mg/dL (23 Jul 2023 07:48)  POCT Blood Glucose.: 217 mg/dL (22 Jul 2023 21:15)  POCT Blood Glucose.: 126 mg/dL (22 Jul 2023 18:53)  POCT Blood Glucose.: 183 mg/dL (22 Jul 2023 17:22)  POCT Blood Glucose.: 121 mg/dL (22 Jul 2023 16:08)  POCT Blood Glucose.: 153 mg/dL (22 Jul 2023 14:43)  POCT Blood Glucose.: 173 mg/dL (22 Jul 2023 12:53)      Anion Gap: 11 (07-23 @ 00:31)  Anion Gap: 10 (07-22 @ 00:56)      Calcium: 8.4 (07-23 @ 00:31)  Calcium: 8.5 (07-22 @ 00:56)  Albumin: 3.2 *L* (07-23 @ 00:31)  Albumin: 3.2 *L* (07-22 @ 00:56)    Alanine Aminotransferase (ALT/SGPT): 51 *H* (07-23 @ 00:31)  Alanine Aminotransferase (ALT/SGPT): 46 *H* (07-22 @ 00:56)  Alkaline Phosphatase: 69 (07-23 @ 00:31)  Alkaline Phosphatase: 62 (07-22 @ 00:56)  Aspartate Aminotransferase (AST/SGOT): 35 (07-23 @ 00:31)  Aspartate Aminotransferase (AST/SGOT): 44 *H* (07-22 @ 00:56)        07-23    142  |  109<H>  |  28<H>  ----------------------------<  187<H>  3.8   |  22  |  1.01    Ca    8.4      23 Jul 2023 00:31  Phos  2.3     07-23  Mg     2.3     07-23    TPro  5.6<L>  /  Alb  3.2<L>  /  TBili  0.7  /  DBili  x   /  AST  35  /  ALT  51<H>  /  AlkPhos  69  07-23                        8.4    6.96  )-----------( 144      ( 23 Jul 2023 00:29 )             25.9     Medications  MEDICATIONS  (STANDING):  amLODIPine   Tablet 5 milliGRAM(s) Oral daily  ascorbic acid 500 milliGRAM(s) Oral two times a day  aspirin enteric coated 81 milliGRAM(s) Oral daily  atorvastatin 80 milliGRAM(s) Oral at bedtime  bisacodyl Suppository 10 milliGRAM(s) Rectal once  chlorhexidine 2% Cloths 1 Application(s) Topical daily  colchicine 0.6 milliGRAM(s) Oral daily  enoxaparin Injectable 40 milliGRAM(s) SubCutaneous every 24 hours  furosemide    Tablet 20 milliGRAM(s) Oral daily  insulin glargine Injectable (LANTUS) 24 Unit(s) SubCutaneous at bedtime  insulin lispro (ADMELOG) corrective regimen sliding scale   SubCutaneous Before meals and at bedtime  insulin lispro Injectable (ADMELOG) 10 Unit(s) SubCutaneous three times a day before meals  insulin regular Infusion 3 Unit(s)/Hr (3 mL/Hr) IV Continuous <Continuous>  metoclopramide Injectable 10 milliGRAM(s) IV Push every 8 hours  metoprolol tartrate 25 milliGRAM(s) Oral every 8 hours  pantoprazole    Tablet 40 milliGRAM(s) Oral before breakfast  polyethylene glycol 3350 17 Gram(s) Oral daily  senna 2 Tablet(s) Oral at bedtime  sodium chloride 0.9%. 1000 milliLiter(s) (10 mL/Hr) IV Continuous <Continuous>      Physical Exam  General: Patient comfortable in bed   Vital Signs Last 12 Hrs  T(F): 97.9 (07-23-23 @ 06:23), Max: 98.7 (07-23-23 @ 04:00)  HR: 77 (07-23-23 @ 06:23) (75 - 80)  BP: 131/82 (07-23-23 @ 06:23) (121/57 - 142/73)  BP(mean): 98 (07-23-23 @ 06:23) (81 - 100)  RR: 18 (07-23-23 @ 06:23) (16 - 20)  SpO2: 99% (07-23-23 @ 06:23) (96% - 99%)    CVS: S1S2   Respiratory: No wheezing, no crepitations  GI: Abdomen soft, bowel sounds positive  Musculoskeletal:  moves all extremities  : Voiding

## 2023-07-23 NOTE — PROGRESS NOTE ADULT - ASSESSMENT
67-year-old male with NIDDM2, HTN, HLD, asthma, CPAP on LUCRECIA, sent to the ED by Dr. Hart/cardio  for work-up of EKG abnormalities s/p CABG now.  neuro called for AMS post op   Cardiac cath done on 7/14/23 showed - LHC: pLAD 90%, D1 95%, mLCx 90%, mRCA 90%, LVEDP 15   now s/p CABG   A1c 7 LDL 57  CD 7/18/23 no significant hemodynamic significant stenosis   Tmax 100.2   NIHSS 2 premrs 0    Impression:   AMS of unclear etiology but non focal exam. possible toxic metabolic in nature ; seems improving     - can check CTH if change in mental status, seems to be improving   - r/o infection   - c/w asa and statin therapy  - unable for MRI at present  - if no improvement will considser further workup with rEEG in future  ; can defer for now   - telemetry  - PT/OT   - check FS, glucose control <180  - GI/DVT ppx- Thank you for allowing me to participate in the care of this patient. Call with questions.   Donavan Gonzalez MD  Vascular Neurology  Office: [phone]

## 2023-07-24 DIAGNOSIS — R41.0 DISORIENTATION, UNSPECIFIED: ICD-10-CM

## 2023-07-24 DIAGNOSIS — Z95.1 PRESENCE OF AORTOCORONARY BYPASS GRAFT: ICD-10-CM

## 2023-07-24 LAB
ALBUMIN SERPL ELPH-MCNC: 3.2 G/DL — LOW (ref 3.3–5)
ALP SERPL-CCNC: 84 U/L — SIGNIFICANT CHANGE UP (ref 40–120)
ALT FLD-CCNC: 50 U/L — HIGH (ref 10–45)
ANION GAP SERPL CALC-SCNC: 12 MMOL/L — SIGNIFICANT CHANGE UP (ref 5–17)
AST SERPL-CCNC: 29 U/L — SIGNIFICANT CHANGE UP (ref 10–40)
BASOPHILS # BLD AUTO: 0.03 K/UL — SIGNIFICANT CHANGE UP (ref 0–0.2)
BASOPHILS NFR BLD AUTO: 0.4 % — SIGNIFICANT CHANGE UP (ref 0–2)
BILIRUB SERPL-MCNC: 0.7 MG/DL — SIGNIFICANT CHANGE UP (ref 0.2–1.2)
BUN SERPL-MCNC: 24 MG/DL — HIGH (ref 7–23)
CALCIUM SERPL-MCNC: 8.4 MG/DL — SIGNIFICANT CHANGE UP (ref 8.4–10.5)
CHLORIDE SERPL-SCNC: 106 MMOL/L — SIGNIFICANT CHANGE UP (ref 96–108)
CO2 SERPL-SCNC: 24 MMOL/L — SIGNIFICANT CHANGE UP (ref 22–31)
CREAT SERPL-MCNC: 1.04 MG/DL — SIGNIFICANT CHANGE UP (ref 0.5–1.3)
EGFR: 80 ML/MIN/1.73M2 — SIGNIFICANT CHANGE UP
EOSINOPHIL # BLD AUTO: 0.07 K/UL — SIGNIFICANT CHANGE UP (ref 0–0.5)
EOSINOPHIL NFR BLD AUTO: 1 % — SIGNIFICANT CHANGE UP (ref 0–6)
GLUCOSE BLDC GLUCOMTR-MCNC: 165 MG/DL — HIGH (ref 70–99)
GLUCOSE BLDC GLUCOMTR-MCNC: 166 MG/DL — HIGH (ref 70–99)
GLUCOSE BLDC GLUCOMTR-MCNC: 167 MG/DL — HIGH (ref 70–99)
GLUCOSE BLDC GLUCOMTR-MCNC: 62 MG/DL — LOW (ref 70–99)
GLUCOSE BLDC GLUCOMTR-MCNC: 63 MG/DL — LOW (ref 70–99)
GLUCOSE BLDC GLUCOMTR-MCNC: 97 MG/DL — SIGNIFICANT CHANGE UP (ref 70–99)
GLUCOSE SERPL-MCNC: 148 MG/DL — HIGH (ref 70–99)
HCT VFR BLD CALC: 27.8 % — LOW (ref 39–50)
HGB BLD-MCNC: 9 G/DL — LOW (ref 13–17)
IMM GRANULOCYTES NFR BLD AUTO: 0.7 % — SIGNIFICANT CHANGE UP (ref 0–0.9)
LYMPHOCYTES # BLD AUTO: 1.76 K/UL — SIGNIFICANT CHANGE UP (ref 1–3.3)
LYMPHOCYTES # BLD AUTO: 26.3 % — SIGNIFICANT CHANGE UP (ref 13–44)
MCHC RBC-ENTMCNC: 27.9 PG — SIGNIFICANT CHANGE UP (ref 27–34)
MCHC RBC-ENTMCNC: 32.4 GM/DL — SIGNIFICANT CHANGE UP (ref 32–36)
MCV RBC AUTO: 86.1 FL — SIGNIFICANT CHANGE UP (ref 80–100)
MONOCYTES # BLD AUTO: 0.78 K/UL — SIGNIFICANT CHANGE UP (ref 0–0.9)
MONOCYTES NFR BLD AUTO: 11.6 % — SIGNIFICANT CHANGE UP (ref 2–14)
NEUTROPHILS # BLD AUTO: 4.01 K/UL — SIGNIFICANT CHANGE UP (ref 1.8–7.4)
NEUTROPHILS NFR BLD AUTO: 60 % — SIGNIFICANT CHANGE UP (ref 43–77)
NRBC # BLD: 0 /100 WBCS — SIGNIFICANT CHANGE UP (ref 0–0)
PLATELET # BLD AUTO: 183 K/UL — SIGNIFICANT CHANGE UP (ref 150–400)
POTASSIUM SERPL-MCNC: 3.7 MMOL/L — SIGNIFICANT CHANGE UP (ref 3.5–5.3)
POTASSIUM SERPL-SCNC: 3.7 MMOL/L — SIGNIFICANT CHANGE UP (ref 3.5–5.3)
PROT SERPL-MCNC: 6 G/DL — SIGNIFICANT CHANGE UP (ref 6–8.3)
RBC # BLD: 3.23 M/UL — LOW (ref 4.2–5.8)
RBC # FLD: 15.8 % — HIGH (ref 10.3–14.5)
SODIUM SERPL-SCNC: 142 MMOL/L — SIGNIFICANT CHANGE UP (ref 135–145)
WBC # BLD: 6.7 K/UL — SIGNIFICANT CHANGE UP (ref 3.8–10.5)
WBC # FLD AUTO: 6.7 K/UL — SIGNIFICANT CHANGE UP (ref 3.8–10.5)

## 2023-07-24 PROCEDURE — 71045 X-RAY EXAM CHEST 1 VIEW: CPT | Mod: 26

## 2023-07-24 RX ORDER — FUROSEMIDE 40 MG
40 TABLET ORAL DAILY
Refills: 0 | Status: DISCONTINUED | OUTPATIENT
Start: 2023-07-24 | End: 2023-07-25

## 2023-07-24 RX ORDER — SPIRONOLACTONE 25 MG/1
25 TABLET, FILM COATED ORAL DAILY
Refills: 0 | Status: DISCONTINUED | OUTPATIENT
Start: 2023-07-24 | End: 2023-07-25

## 2023-07-24 RX ORDER — METOPROLOL TARTRATE 50 MG
50 TABLET ORAL
Refills: 0 | Status: DISCONTINUED | OUTPATIENT
Start: 2023-07-24 | End: 2023-07-25

## 2023-07-24 RX ORDER — POTASSIUM CHLORIDE 20 MEQ
40 PACKET (EA) ORAL ONCE
Refills: 0 | Status: COMPLETED | OUTPATIENT
Start: 2023-07-24 | End: 2023-07-24

## 2023-07-24 RX ORDER — INSULIN LISPRO 100/ML
10 VIAL (ML) SUBCUTANEOUS
Refills: 0 | Status: DISCONTINUED | OUTPATIENT
Start: 2023-07-24 | End: 2023-07-25

## 2023-07-24 RX ORDER — INSULIN GLARGINE 100 [IU]/ML
28 INJECTION, SOLUTION SUBCUTANEOUS AT BEDTIME
Refills: 0 | Status: DISCONTINUED | OUTPATIENT
Start: 2023-07-24 | End: 2023-07-25

## 2023-07-24 RX ORDER — INSULIN LISPRO 100/ML
14 VIAL (ML) SUBCUTANEOUS
Refills: 0 | Status: DISCONTINUED | OUTPATIENT
Start: 2023-07-24 | End: 2023-07-25

## 2023-07-24 RX ORDER — INSULIN LISPRO 100/ML
12 VIAL (ML) SUBCUTANEOUS
Refills: 0 | Status: DISCONTINUED | OUTPATIENT
Start: 2023-07-24 | End: 2023-07-25

## 2023-07-24 RX ADMIN — PANTOPRAZOLE SODIUM 40 MILLIGRAM(S): 20 TABLET, DELAYED RELEASE ORAL at 05:31

## 2023-07-24 RX ADMIN — Medication 40 MILLIEQUIVALENT(S): at 12:04

## 2023-07-24 RX ADMIN — Medication 10 UNIT(S): at 11:52

## 2023-07-24 RX ADMIN — Medication 25 MILLIGRAM(S): at 05:34

## 2023-07-24 RX ADMIN — Medication 50 MILLIGRAM(S): at 17:14

## 2023-07-24 RX ADMIN — Medication 500 MILLIGRAM(S): at 05:31

## 2023-07-24 RX ADMIN — Medication 100 MILLIGRAM(S): at 22:07

## 2023-07-24 RX ADMIN — SPIRONOLACTONE 25 MILLIGRAM(S): 25 TABLET, FILM COATED ORAL at 06:33

## 2023-07-24 RX ADMIN — SENNA PLUS 2 TABLET(S): 8.6 TABLET ORAL at 22:07

## 2023-07-24 RX ADMIN — ATORVASTATIN CALCIUM 80 MILLIGRAM(S): 80 TABLET, FILM COATED ORAL at 22:07

## 2023-07-24 RX ADMIN — ENOXAPARIN SODIUM 40 MILLIGRAM(S): 100 INJECTION SUBCUTANEOUS at 05:32

## 2023-07-24 RX ADMIN — AMLODIPINE BESYLATE 5 MILLIGRAM(S): 2.5 TABLET ORAL at 05:31

## 2023-07-24 RX ADMIN — Medication 81 MILLIGRAM(S): at 11:53

## 2023-07-24 RX ADMIN — Medication 14 UNIT(S): at 16:51

## 2023-07-24 RX ADMIN — Medication 1: at 11:52

## 2023-07-24 RX ADMIN — INSULIN GLARGINE 28 UNIT(S): 100 INJECTION, SOLUTION SUBCUTANEOUS at 22:06

## 2023-07-24 RX ADMIN — CHLORHEXIDINE GLUCONATE 1 APPLICATION(S): 213 SOLUTION TOPICAL at 11:49

## 2023-07-24 RX ADMIN — Medication 40 MILLIGRAM(S): at 06:32

## 2023-07-24 RX ADMIN — Medication 12 UNIT(S): at 08:38

## 2023-07-24 RX ADMIN — Medication 1: at 08:34

## 2023-07-24 RX ADMIN — Medication 0.6 MILLIGRAM(S): at 11:53

## 2023-07-24 RX ADMIN — Medication 20 MILLIGRAM(S): at 05:31

## 2023-07-24 NOTE — PROGRESS NOTE ADULT - SUBJECTIVE AND OBJECTIVE BOX
Subjective:  "Hello"  Ambulated 200 ft w/ RW  Gait steady      Tele:  SR            V/S:                    T(F): 98.2 (23 @ 04:50), Max: 98.2 (23 @ 11:52)  HR: 77 (23 @ 06:30) (77 - 92)  BP: 114/67 (23 @ 06:30) (114/67 - 150/75)  RR: 18 (23 @ 04:50) (17 - 18)  SpO2: 94% (23 @ 04:50) (93% - 94%)  Wt(kg): --      LV EF:      Labs:      142  |  106  |  24<H>  ----------------------------<  148<H>  3.7   |  24  |  1.04    Ca    8.4      2023 05:48  Phos  2.3       Mg     2.3         TPro  6.0  /  Alb  3.2<L>  /  TBili  0.7  /  DBili  x   /  AST  29  /  ALT  50<H>  /  AlkPhos  84                                 9.0    6.70  )-----------( 183      ( 2023 05:48 )             27.8             CAPILLARY BLOOD GLUCOSE      POCT Blood Glucose.: 166 mg/dL (2023 11:44)  POCT Blood Glucose.: 167 mg/dL (2023 07:56)  POCT Blood Glucose.: 224 mg/dL (2023 20:58)  POCT Blood Glucose.: 136 mg/dL (2023 16:38)           CXR:    I&O's Detail    2023 07:01  -  2023 07:00  --------------------------------------------------------  IN:    Oral Fluid: 920 mL  Total IN: 920 mL    OUT:    Chest Tube (mL): 20 mL    Voided (mL): 1600 mL  Total OUT: 1620 mL    Total NET: -700 mL      2023 07:01  -  2023 11:45  --------------------------------------------------------  IN:    Oral Fluid: 120 mL  Total IN: 120 mL    OUT:  Total OUT: 0 mL    Total NET: 120 mL      EPICARDIAL WIRES:  [x ] YES [ ] NO      BOWEL MOVEMENT:  [x ] YES [ ] NO      Daily     Daily Weight in k.5 (2023 07:39)  Medications:  acetaminophen     Tablet .. 650 milliGRAM(s) Oral every 6 hours PRN  amLODIPine   Tablet 5 milliGRAM(s) Oral daily  aspirin enteric coated 81 milliGRAM(s) Oral daily  atorvastatin 80 milliGRAM(s) Oral at bedtime  bisacodyl Suppository 10 milliGRAM(s) Rectal once  chlorhexidine 2% Cloths 1 Application(s) Topical daily  colchicine 0.6 milliGRAM(s) Oral daily  enoxaparin Injectable 40 milliGRAM(s) SubCutaneous every 24 hours  furosemide    Tablet 40 milliGRAM(s) Oral daily  insulin glargine Injectable (LANTUS) 28 Unit(s) SubCutaneous at bedtime  insulin lispro (ADMELOG) corrective regimen sliding scale   SubCutaneous Before meals and at bedtime  insulin lispro Injectable (ADMELOG) 12 Unit(s) SubCutaneous before breakfast  insulin lispro Injectable (ADMELOG) 10 Unit(s) SubCutaneous before lunch  insulin lispro Injectable (ADMELOG) 14 Unit(s) SubCutaneous before dinner  metoprolol tartrate 25 milliGRAM(s) Oral every 8 hours  oxyCODONE    IR 5 milliGRAM(s) Oral every 4 hours PRN  oxyCODONE    IR 10 milliGRAM(s) Oral every 4 hours PRN  pantoprazole    Tablet 40 milliGRAM(s) Oral before breakfast  polyethylene glycol 3350 17 Gram(s) Oral daily  potassium chloride    Tablet ER 40 milliEquivalent(s) Oral once  senna 2 Tablet(s) Oral at bedtime  sodium chloride 0.9%. 1000 milliLiter(s) IV Continuous <Continuous>  spironolactone 25 milliGRAM(s) Oral daily        Physical Exam:      Neurology: alert and oriented x 3   CV :  RRR  PW  Sternal Wound :  CDI , Stable  Lungs:   CTA B/L  Abdomen: soft, nontender, nondistended, positive bowel sounds,  Extremities:     bl    trace pedal edema             PAST MEDICAL & SURGICAL HISTORY:  Hypertension      Hyperlipemia      Diabetes mellitus      Asthma      LUCRECIA on CPAP      Triple vessel disease of the heart      History of appendectomy

## 2023-07-24 NOTE — PROGRESS NOTE ADULT - ASSESSMENT
67-year-old male with NIDDM2, HTN, HLD, asthma, CPAP on LUCRECIA, sent to the ED by Dr. Hart/cardio  for work-up of EKG abnormalities s/p CABG now.  neuro called for AMS post op   Cardiac cath done on 7/14/23 showed - LHC: pLAD 90%, D1 95%, mLCx 90%, mRCA 90%, LVEDP 15   now s/p CABG   A1c 7 LDL 57  CD 7/18/23 no significant hemodynamic significant stenosis   Tmax 100.2   NIHSS 2 premrs 0    Impression:   AMS of unclear etiology but non focal exam. possible toxic metabolic in nature ; seems improving     - can check CTH if change in mental status, seems to be improving   - r/o infection   - c/w asa and statin therapy  - unable for MRI at present  - if no improvement will considser further workup with rEEG in future  ; can defer for now   - telemetry  - PT/OT   - check FS, glucose control <180  - GI/DVT ppx- Thank you for allowing me to participate in the care of this patient. Call with questions.   Donavan Gonzalez MD  Vascular Neurology  Office: [phone]

## 2023-07-24 NOTE — PROGRESS NOTE ADULT - PROBLEM SELECTOR PLAN 1
Will increase Lantus to 28 u at bedtime.  Will increase Admelog to 12 u before breakfast, 10 u Lunch, 14 u for Dinner   continue Admelog correction scale coverage.   Will continue monitoring FS, log, and glucose trends, will Follow up.  Patient counseled for compliance with consistent low carb diet and exercise as tolerated outpatient.

## 2023-07-24 NOTE — PROGRESS NOTE ADULT - ASSESSMENT
_________________________________________________________________________________________  ========>>  M E D I C A L   A T T E N D I N G    F O L L O W  U P  N O T E  <<=========  -----------------------------------------------------------------------------------------------------    - Patient seen and examined by me earlier today.  patient recovering well post CABG  - In summary,  GILA SHAH is a 67y year old man admitted with CP / CAD   - Patient doing ok post op CABG X5! comfortable, .. no major events reported / noted otherwise     ==================>> REVIEW OF SYSTEM <<=================    GEN: no fever, no chills, no significant pain reported   RESP: no SOB, no cough, no sputum  CVS:  chest pain, no palpitations, no edema  GI: no abdominal pain, no nausea, no vomiting  : no dysuria, no issues otherwise   NEURO: no headache, no dizziness  PSYCH: no depression, not anxious  Derm : no itching, no rash    ==================>> PHYSICAL EXAM <<=================    GEN: Alert, Oriented, in chair, NAD , comfortable in chair,,   HEENT: NCAT, MMM, PERRL  Neck: supple , central line in place   CVS: S1S2 , regular   PULM: CTA B/L,  limited ... chest tubes in place X1  ABD.: soft. non tender, non distended  Extrem: no significant edema, hernández out   neuro-Psych: alert, in good spirits                              ( Note written / Date of service 07-24-23 )    ==================>> MEDICATIONS <<====================    amLODIPine   Tablet 5 milliGRAM(s) Oral daily  aspirin enteric coated 81 milliGRAM(s) Oral daily  atorvastatin 80 milliGRAM(s) Oral at bedtime  bisacodyl Suppository 10 milliGRAM(s) Rectal once  chlorhexidine 2% Cloths 1 Application(s) Topical daily  colchicine 0.6 milliGRAM(s) Oral daily  enoxaparin Injectable 40 milliGRAM(s) SubCutaneous every 24 hours  furosemide    Tablet 40 milliGRAM(s) Oral daily  insulin glargine Injectable (LANTUS) 28 Unit(s) SubCutaneous at bedtime  insulin lispro (ADMELOG) corrective regimen sliding scale   SubCutaneous Before meals and at bedtime  insulin lispro Injectable (ADMELOG) 12 Unit(s) SubCutaneous before breakfast  insulin lispro Injectable (ADMELOG) 10 Unit(s) SubCutaneous before lunch  insulin lispro Injectable (ADMELOG) 14 Unit(s) SubCutaneous before dinner  metoprolol tartrate 50 milliGRAM(s) Oral two times a day  pantoprazole    Tablet 40 milliGRAM(s) Oral before breakfast  polyethylene glycol 3350 17 Gram(s) Oral daily  senna 2 Tablet(s) Oral at bedtime  sodium chloride 0.9%. 1000 milliLiter(s) IV Continuous <Continuous>  spironolactone 25 milliGRAM(s) Oral daily    MEDICATIONS  (PRN):  acetaminophen     Tablet .. 650 milliGRAM(s) Oral every 6 hours PRN Mild Pain (1 - 3)  oxyCODONE    IR 5 milliGRAM(s) Oral every 4 hours PRN Moderate Pain (4 - 6)  oxyCODONE    IR 10 milliGRAM(s) Oral every 4 hours PRN Severe Pain (7 - 10)    ___________  Active diet:  Diet, Regular:   Consistent Carbohydrate No Snacks (CSTCHO)  ___________________    ==================>> VITAL SIGNS <<==================    Vital Signs Last 24 HrsT(C): 36.8 (07-24-23 @ 12:19)  T(F): 98.3 (07-24-23 @ 12:19), Max: 98.3 (07-24-23 @ 12:19)  HR: 70 (07-24-23 @ 12:19) (70 - 87)  BP: 147/81 (07-24-23 @ 12:19)  RR: 18 (07-24-23 @ 12:19) (17 - 18)  SpO2: 97% (07-24-23 @ 12:19) (94% - 97%)      POCT Blood Glucose.: 166 mg/dL (24 Jul 2023 11:44)  POCT Blood Glucose.: 167 mg/dL (24 Jul 2023 07:56)  POCT Blood Glucose.: 224 mg/dL (23 Jul 2023 20:58)  POCT Blood Glucose.: 136 mg/dL (23 Jul 2023 16:38)     ==================>> LAB AND IMAGING <<==================                        9.0    6.70  )-----------( 183      ( 24 Jul 2023 05:48 )             27.8        07-24    142  |  106  |  24<H>  ----------------------------<  148<H>  3.7   |  24  |  1.04    Ca    8.4      24 Jul 2023 05:48  Phos  2.3     07-23  Mg     2.3     07-23    TPro  6.0  /  Alb  3.2<L>  /  TBili  0.7  /  DBili  x   /  AST  29  /  ALT  50<H>  /  AlkPhos  84  07-24    WBC count:   6.70 <<== ,  6.96 <<== ,  9.52 <<== ,  9.88 <<== ,  6.45 <<== ,  7.31 <<==   Hemoglobin:   9.0 <<==,  8.4 <<==,  8.9 <<==,  9.5 <<==,  9.0 <<==,  10.1 <<==  platelets:  183 <==, 144 <==, 141 <==, 159 <==, 159 <==, 155 <==    Creatinine:  1.04  <<==, 1.01  <<==, 0.92  <<==, 0.97  <<==, 1.18  <<==, 0.96  <<==  Sodium:   142  <==, 142  <==, 142  <==, 143  <==, 146  <==, 147  <==       AST:          29 <== , 35 <== , 44 <== , 37 <== , 48 <==      ALT:        50  <== , 51  <== , 46  <== , 28  <== , 27  <==      AP:        84  <=, 69  <=, 62  <=, 55  <=, 51  <=     Bili:        0.7  <=, 0.7  <=, 0.9  <=, 0.7  <=, 1.1  <=      < from: TTE W or WO Ultrasound Enhancing Agent (07.18.23 @ 08:37) >  CONCLUSIONS:    1. Normal left ventricular cavity size. The left ventricular wall thickness is normal. The left ventricular systolic function is normal with an ejection fraction of 65 % by Black's method of disks. There are regional wall motion abnormalities No evidence of a thrombus in the left ventricle.   2. Mid inferolateral segment and mid anterolateral segment are abnormal.      Mid inferolateral segment and mid anterolateral segment are abnormal.   3. Normal atria.   4. Normal right ventricular cavity size, normal right ventricular wall thickness and normal right ventricular systolic function. The tricuspid annular plane systolic excursion (TAPSE) is 2.1 cm (normal >=1.7 cm).   5. No significant valvular disease.   6. No pericardial effusion seen.   7. No prior echocardiogram is available for comparison.  < end of copied text >    < from: Cardiac Catheterization (07.14.23 @ 09:51) >  Procedures:                 1.    Arterial Access - Right Radial   2.    Left Heart Cath   3.    Diagnostic Coronary Angiography   Primary ICD-10: I25.110 - Atherosclerotic heart disease of native  coronary artery with unstable angina pectoris   Secondary ICD-10   E11.59 - Type 2 diabetes mellitus with other circulatory complications  I10 - Essential (primary) hypertension     Diagnostic Conclusions:   Patient has severe triple vessel disease as described with progressive  symptoms of angin    Recommendations:   Revascularization with CABG. Transfer to [facility]   < end of copied text >    ___________________________________________________________________________________  ===============>>  A S S E S S M E N T   A N D   P L A N <<===============  ------------------------------------------------------------------------------------------    · Assessment	  67-year-old male with NIDDM2, HTN, HLD, asthma, CPAP on LUCRECIA, sent to the ED by Dr. Hart for work-up of EKG abnormalities.     Problem/Plan - 1:  ·  Problem: severe CAD post CABG, doing overall well   Appreciated CT surgery care and management   monitor on tele  med optimization per cardiologist  Management of chest tubes as per surgery team  pain management as needed  supportive care   PT / oob as able  nutrition, PO hydration     Problem/Plan - 2:  ·  Problem: type 2 diabetes mellitus.   FS Q6H w/ISS.  monitor  FS closely asia- op  endocrine appreciated      Problem/Plan - 3:  ·  Problem: LUCRECIA on CPAP.   > would need CPAP Nightly     --------------------------------------------  Case discussed with patient,   ___________________________  Will follow with you  thank you  NOREEN Wilkinson D.O.  Pager: [phone]

## 2023-07-24 NOTE — DIETITIAN INITIAL EVALUATION ADULT - PERSON TAUGHT/METHOD
Emphasized importance of adequate lean protein intake and optimal blood glucose levels for incision healing. Advised pt to limit concentrated sweets, portion control, and importance of fiber intake. Also discussed importance of limiting sodium intake as well as heart healthy food options. Provided education on Carbohydrate Consistent diet including sources of carbohydrates, portion sizes, pairing protein with carbohydrates, limiting sugar sweetened beverages in diet and the importance of consistent eating pattern to help optimize glycemic control. Provided pt with the following packet: Carbohydrate Counting for People with Diabetes/verbal instruction/written material/patient instructed

## 2023-07-24 NOTE — DIETITIAN INITIAL EVALUATION ADULT - PERTINENT MEDS FT
MEDICATIONS  (STANDING):  amLODIPine   Tablet 5 milliGRAM(s) Oral daily  aspirin enteric coated 81 milliGRAM(s) Oral daily  atorvastatin 80 milliGRAM(s) Oral at bedtime  bisacodyl Suppository 10 milliGRAM(s) Rectal once  chlorhexidine 2% Cloths 1 Application(s) Topical daily  colchicine 0.6 milliGRAM(s) Oral daily  enoxaparin Injectable 40 milliGRAM(s) SubCutaneous every 24 hours  furosemide    Tablet 40 milliGRAM(s) Oral daily  insulin glargine Injectable (LANTUS) 28 Unit(s) SubCutaneous at bedtime  insulin lispro (ADMELOG) corrective regimen sliding scale   SubCutaneous Before meals and at bedtime  insulin lispro Injectable (ADMELOG) 12 Unit(s) SubCutaneous before breakfast  insulin lispro Injectable (ADMELOG) 10 Unit(s) SubCutaneous before lunch  insulin lispro Injectable (ADMELOG) 14 Unit(s) SubCutaneous before dinner  insulin regular Infusion 3 Unit(s)/Hr (3 mL/Hr) IV Continuous <Continuous>  metoprolol tartrate 25 milliGRAM(s) Oral every 8 hours  pantoprazole    Tablet 40 milliGRAM(s) Oral before breakfast  polyethylene glycol 3350 17 Gram(s) Oral daily  senna 2 Tablet(s) Oral at bedtime  sodium chloride 0.9%. 1000 milliLiter(s) (10 mL/Hr) IV Continuous <Continuous>  spironolactone 25 milliGRAM(s) Oral daily    MEDICATIONS  (PRN):  acetaminophen     Tablet .. 650 milliGRAM(s) Oral every 6 hours PRN Mild Pain (1 - 3)  oxyCODONE    IR 5 milliGRAM(s) Oral every 4 hours PRN Moderate Pain (4 - 6)  oxyCODONE    IR 10 milliGRAM(s) Oral every 4 hours PRN Severe Pain (7 - 10)

## 2023-07-24 NOTE — PROGRESS NOTE ADULT - SUBJECTIVE AND OBJECTIVE BOX
Neurology Progress Note    S: Patient seen and examined  on floor. doing okay     Medication:  MEDICATIONS  (STANDING):  amLODIPine   Tablet 5 milliGRAM(s) Oral daily  aspirin enteric coated 81 milliGRAM(s) Oral daily  atorvastatin 80 milliGRAM(s) Oral at bedtime  bisacodyl Suppository 10 milliGRAM(s) Rectal once  chlorhexidine 2% Cloths 1 Application(s) Topical daily  colchicine 0.6 milliGRAM(s) Oral daily  enoxaparin Injectable 40 milliGRAM(s) SubCutaneous every 24 hours  furosemide    Tablet 40 milliGRAM(s) Oral daily  insulin glargine Injectable (LANTUS) 28 Unit(s) SubCutaneous at bedtime  insulin lispro (ADMELOG) corrective regimen sliding scale   SubCutaneous Before meals and at bedtime  insulin lispro Injectable (ADMELOG) 12 Unit(s) SubCutaneous before breakfast  insulin lispro Injectable (ADMELOG) 10 Unit(s) SubCutaneous before lunch  insulin lispro Injectable (ADMELOG) 14 Unit(s) SubCutaneous before dinner  insulin regular Infusion 3 Unit(s)/Hr (3 mL/Hr) IV Continuous <Continuous>  metoprolol tartrate 25 milliGRAM(s) Oral every 8 hours  pantoprazole    Tablet 40 milliGRAM(s) Oral before breakfast  polyethylene glycol 3350 17 Gram(s) Oral daily  senna 2 Tablet(s) Oral at bedtime  sodium chloride 0.9%. 1000 milliLiter(s) (10 mL/Hr) IV Continuous <Continuous>  spironolactone 25 milliGRAM(s) Oral daily    MEDICATIONS  (PRN):  acetaminophen     Tablet .. 650 milliGRAM(s) Oral every 6 hours PRN Mild Pain (1 - 3)  oxyCODONE    IR 5 milliGRAM(s) Oral every 4 hours PRN Moderate Pain (4 - 6)  oxyCODONE    IR 10 milliGRAM(s) Oral every 4 hours PRN Severe Pain (7 - 10)    Vitals:  Vital Signs Last 24 Hrs  T(C): 36.8 (07-24-23 @ 04:50), Max: 36.8 (07-23-23 @ 11:52)  T(F): 98.2 (07-24-23 @ 04:50), Max: 98.2 (07-23-23 @ 11:52)  HR: 77 (07-24-23 @ 06:30) (77 - 92)  BP: 114/67 (07-24-23 @ 06:30) (114/67 - 150/75)  BP(mean): 100 (07-23-23 @ 14:19) (100 - 100)  RR: 18 (07-24-23 @ 04:50) (17 - 18)  SpO2: 94% (07-24-23 @ 04:50) (93% - 94%)            General Exam:   General Appearance: Appropriately dressed and in no acute distress       Head: Normocephalic, atraumatic and no dysmorphic features  Ear, Nose, and Throat: Moist mucous membranes  CVS: S1S2+  Resp: No SOB, no wheeze or rhonchi  GI: soft NT/ND  Extremities: No edema or cyanosis  Skin: No bruises or rashes     Neurological Exam:  Mental Status: Awake, alert and oriented x 2.  Able to follow simple verbal commands. Able to name and repeat. fluent speech. No obvious aphasia minimal dysarthria noted.   Cranial Nerves: PERRL, EOMI, VFFC, sensation V1-V3 intact,  no obvious facial asymmetry, equal elevation of palate, scm/trap 5/5, tongue is midline on protrusion. no obvious papilledema on fundoscopic exam. hearing is grossly intact.   Motor: Normal bulk, tone and strength throughout. Fine finger movements were intact and symmetric. no tremors or drift noted.    Sensation: Intact to light touch and pinprick throughout. no right/left confusion. no extinction to tactile on DSS.    Reflexes: 1+ throughout at biceps, brachioradialis, triceps, patellars and ankles bilaterally and equal. No clonus. R toe and L toe were both downgoing.  Coordination: No dysmetria on FNF   Gait: walked on unit yestesrday    I personally reviewed the below data/images/labs:    CBC Full  -  ( 24 Jul 2023 05:48 )  WBC Count : 6.70 K/uL  RBC Count : 3.23 M/uL  Hemoglobin : 9.0 g/dL  Hematocrit : 27.8 %  Platelet Count - Automated : 183 K/uL  Mean Cell Volume : 86.1 fl  Mean Cell Hemoglobin : 27.9 pg  Mean Cell Hemoglobin Concentration : 32.4 gm/dL  Auto Neutrophil # : 4.01 K/uL  Auto Lymphocyte # : 1.76 K/uL  Auto Monocyte # : 0.78 K/uL  Auto Eosinophil # : 0.07 K/uL  Auto Basophil # : 0.03 K/uL  Auto Neutrophil % : 60.0 %  Auto Lymphocyte % : 26.3 %  Auto Monocyte % : 11.6 %  Auto Eosinophil % : 1.0 %  Auto Basophil % : 0.4 %      07-24    142  |  106  |  24<H>  ----------------------------<  148<H>  3.7   |  24  |  1.04    Ca    8.4      24 Jul 2023 05:48  Phos  2.3     07-23  Mg     2.3     07-23    TPro  6.0  /  Alb  3.2<L>  /  TBili  0.7  /  DBili  x   /  AST  29  /  ALT  50<H>  /  AlkPhos  84  07-24      Urinalysis Basic - ( 22 Jul 2023 00:56 )    Color: x / Appearance: x / SG: x / pH: x  Gluc: 108 mg/dL / Ketone: x  / Bili: x / Urobili: x   Blood: x / Protein: x / Nitrite: x   Leuk Esterase: x / RBC: x / WBC x   Sq Epi: x / Non Sq Epi: x / Bacteria: x        < from: VA Duplex Carotid, Bilat (07.18.23 @ 10:17) >    ACC: [id number] EXAM:  CAROTID DUPLEX BILATERAL   ORDERED BY: MANSI WILEY     PROCEDURE DATE:  07/18/2023          INTERPRETATION:  CLINICAL INFORMATION: Coronary artery disease, evaluate   for carotid artery disease    COMPARISON: None available.    TECHNIQUE: Grayscale, color and spectral Doppler examination of both   carotid arteries was performed.    FINDINGS:    There is increased tortuosity to the right and the left carotid arteries   in the neck.    Mild atheromatous plaque is present along the course of the carotid   arteries in the neck.    No elevated velocities or abnormal waveforms are encountered.    Peak systolic velocities are as follows:    RIGHT:  PROX CCA = 110  DIST CCA = 55  PROX ICA = 71  DIST ICA = 58  ECA = 115    LEFT:  PROX CCA = 95  DIST CCA = 62  PROX ICA = 79  DIST ICA = 69  ECA = 77    Antegrade flow is noted within both vertebral arteries.    IMPRESSION: No significant hemodynamic stenosis of either carotid artery.    Measurement of carotid stenosis is based on velocity parameters that   correlate the residual internal carotid diameter with that of the more   distal vessel in accordance with a method such as the [place] American   Symptomatic Carotid Endarterectomy Trial (NASCET).    --- End of Report ---            ISAIAS CHIU MD; Attending Radiologist  This document has been electronically signed. Jul 18 2023 10:46AM    < end of copied text >

## 2023-07-24 NOTE — PROGRESS NOTE ADULT - SUBJECTIVE AND OBJECTIVE BOX
Cardiovascular Disease Progress Note    Overnight events: No acute events overnight.  no new cardiac sx  Otherwise review of systems negative    Objective Findings:  T(C): 36.8 (07-24-23 @ 04:50), Max: 36.8 (07-23-23 @ 11:52)  HR: 77 (07-24-23 @ 06:30) (77 - 92)  BP: 114/67 (07-24-23 @ 06:30) (114/67 - 150/75)  RR: 18 (07-24-23 @ 04:50) (17 - 18)  SpO2: 94% (07-24-23 @ 04:50) (93% - 94%)  Wt(kg): --  Daily     Daily       Physical Exam:  Gen: NAD  HEENT: EOMI  CV: RRR, normal S1 + S2, no m/r/g  Lungs: CTAB  Abd: soft, non-tender  Ext: No edema    Telemetry:    Laboratory Data:                        9.0    6.70  )-----------( 183      ( 24 Jul 2023 05:48 )             27.8     07-24    142  |  106  |  24<H>  ----------------------------<  148<H>  3.7   |  24  |  1.04    Ca    8.4      24 Jul 2023 05:48  Phos  2.3     07-23  Mg     2.3     07-23    TPro  6.0  /  Alb  3.2<L>  /  TBili  0.7  /  DBili  x   /  AST  29  /  ALT  50<H>  /  AlkPhos  84  07-24              Inpatient Medications:  MEDICATIONS  (STANDING):  amLODIPine   Tablet 5 milliGRAM(s) Oral daily  aspirin enteric coated 81 milliGRAM(s) Oral daily  atorvastatin 80 milliGRAM(s) Oral at bedtime  bisacodyl Suppository 10 milliGRAM(s) Rectal once  chlorhexidine 2% Cloths 1 Application(s) Topical daily  colchicine 0.6 milliGRAM(s) Oral daily  enoxaparin Injectable 40 milliGRAM(s) SubCutaneous every 24 hours  furosemide    Tablet 40 milliGRAM(s) Oral daily  insulin glargine Injectable (LANTUS) 28 Unit(s) SubCutaneous at bedtime  insulin lispro (ADMELOG) corrective regimen sliding scale   SubCutaneous Before meals and at bedtime  insulin lispro Injectable (ADMELOG) 12 Unit(s) SubCutaneous before breakfast  insulin lispro Injectable (ADMELOG) 10 Unit(s) SubCutaneous before lunch  insulin lispro Injectable (ADMELOG) 14 Unit(s) SubCutaneous before dinner  insulin regular Infusion 3 Unit(s)/Hr (3 mL/Hr) IV Continuous <Continuous>  metoprolol tartrate 25 milliGRAM(s) Oral every 8 hours  pantoprazole    Tablet 40 milliGRAM(s) Oral before breakfast  polyethylene glycol 3350 17 Gram(s) Oral daily  senna 2 Tablet(s) Oral at bedtime  sodium chloride 0.9%. 1000 milliLiter(s) (10 mL/Hr) IV Continuous <Continuous>  spironolactone 25 milliGRAM(s) Oral daily      Assessment:  67-year-old male with NIDDM2, HTN, HLD, asthma, CPAP on LUCRECIA presents with unstable angina now s/p cabg    Recs:  s/p cabg under the excellent care of dr benitez avina asa statin and antianginals  care per cts  pain control  maintain euvolemic. on lasix 40 and aldactone 25  f/u neuro  dvt ppx      Over 25 minutes spent on total encounter; more than 50% of the visit was spent counseling and/or coordinating care by the attending physician.      Shaun Nixon MD   Cardiovascular Disease  [phone]

## 2023-07-24 NOTE — DIETITIAN INITIAL EVALUATION ADULT - NSFNSGIIOFT_GEN_A_CORE
07-23-23 @ 07:01  -  07-24-23 @ 07:00  --------------------------------------------------------  OUT:    Chest Tube (mL): 20 mL  Total OUT: 20 mL    Total NET: -20 mL  .  no reported nausea/vomiting/constipation/diarrhea. Last bowel movement per flow sheets 7/23.   .

## 2023-07-24 NOTE — DIETITIAN INITIAL EVALUATION ADULT - ADD RECOMMEND
1) Will continue to monitor PO intake, weight, labs, skin, GI status and diet 2) RD to add No Sugar Added Mighty Shake supplement daily to aid in meeting increased nutrient needs

## 2023-07-24 NOTE — DIETITIAN INITIAL EVALUATION ADULT - ORAL INTAKE PTA/DIET HISTORY
visited pt at bedside this morning. reports to eat chicken, fish, rice, vegetables. otherwise note following a therapeutic diet, not checking blood glucose levels. no DM medications noted in outpatient medications.

## 2023-07-24 NOTE — PROGRESS NOTE ADULT - ASSESSMENT
Assessment  DMT2: 67y Male with DM T2 with hyperglycemia, A1C 7%, postop IV insulin, extubated and  diet advanced. Transferred to stepdown, glucose elevated, insulin adjusted up.   Will need tight glycemic control for postop wound healing.   CAD: on medications, stable, monitored. s/p CABG  ?Hypothyroidism: Elevated TSH 9, Free thyroxine levels not available, pending, no hx of thyroid disease.   HTN: on antihypertensive medications, monitored, asymptomatic.  HLD: on high intensity statin      Discussed plan and management with Attending   Addi Pelayo NP - TEAMS  Dr Yonatan Zapata  [phone]

## 2023-07-24 NOTE — DIETITIAN INITIAL EVALUATION ADULT - PERTINENT LABORATORY DATA
07-24    142  |  106  |  24<H>  ----------------------------<  148<H>  3.7   |  24  |  1.04    Ca    8.4      24 Jul 2023 05:48  Phos  2.3     07-23  Mg     2.3     07-23    TPro  6.0  /  Alb  3.2<L>  /  TBili  0.7  /  DBili  x   /  AST  29  /  ALT  50<H>  /  AlkPhos  84  07-24  POCT Blood Glucose.: 167 mg/dL (07-24-23 @ 07:56)  A1C with Estimated Average Glucose Result: 7.0 % (07-15-23 @ 05:50)

## 2023-07-24 NOTE — PROGRESS NOTE ADULT - SUBJECTIVE AND OBJECTIVE BOX
Chief complaint  Patient is a 67y old  Male who presents with a chief complaint of sp  CABG (23 Jul 2023 12:46)         Labs and Fingersticks  CAPILLARY BLOOD GLUCOSE      POCT Blood Glucose.: 224 mg/dL (23 Jul 2023 20:58)  POCT Blood Glucose.: 136 mg/dL (23 Jul 2023 16:38)  POCT Blood Glucose.: 339 mg/dL (23 Jul 2023 11:11)  POCT Blood Glucose.: 233 mg/dL (23 Jul 2023 10:06)  POCT Blood Glucose.: 194 mg/dL (23 Jul 2023 07:48)      Anion Gap: 12 (07-24 @ 05:48)  Anion Gap: 11 (07-23 @ 00:31)      Calcium: 8.4 (07-24 @ 05:48)  Calcium: 8.4 (07-23 @ 00:31)  Albumin: 3.2 *L* (07-24 @ 05:48)  Albumin: 3.2 *L* (07-23 @ 00:31)    Alanine Aminotransferase (ALT/SGPT): 50 *H* (07-24 @ 05:48)  Alanine Aminotransferase (ALT/SGPT): 51 *H* (07-23 @ 00:31)  Alkaline Phosphatase: 84 (07-24 @ 05:48)  Alkaline Phosphatase: 69 (07-23 @ 00:31)  Aspartate Aminotransferase (AST/SGOT): 29 (07-24 @ 05:48)  Aspartate Aminotransferase (AST/SGOT): 35 (07-23 @ 00:31)        07-24    142  |  106  |  24<H>  ----------------------------<  148<H>  3.7   |  24  |  1.04    Ca    8.4      24 Jul 2023 05:48  Phos  2.3     07-23  Mg     2.3     07-23    TPro  6.0  /  Alb  3.2<L>  /  TBili  0.7  /  DBili  x   /  AST  29  /  ALT  50<H>  /  AlkPhos  84  07-24                        9.0    6.70  )-----------( 183      ( 24 Jul 2023 05:48 )             27.8     Medications  MEDICATIONS  (STANDING):  amLODIPine   Tablet 5 milliGRAM(s) Oral daily  aspirin enteric coated 81 milliGRAM(s) Oral daily  atorvastatin 80 milliGRAM(s) Oral at bedtime  bisacodyl Suppository 10 milliGRAM(s) Rectal once  chlorhexidine 2% Cloths 1 Application(s) Topical daily  colchicine 0.6 milliGRAM(s) Oral daily  enoxaparin Injectable 40 milliGRAM(s) SubCutaneous every 24 hours  furosemide    Tablet 40 milliGRAM(s) Oral daily  insulin glargine Injectable (LANTUS) 28 Unit(s) SubCutaneous at bedtime  insulin lispro (ADMELOG) corrective regimen sliding scale   SubCutaneous Before meals and at bedtime  insulin lispro Injectable (ADMELOG) 12 Unit(s) SubCutaneous before breakfast  insulin lispro Injectable (ADMELOG) 10 Unit(s) SubCutaneous before lunch  insulin lispro Injectable (ADMELOG) 14 Unit(s) SubCutaneous before dinner  insulin regular Infusion 3 Unit(s)/Hr (3 mL/Hr) IV Continuous <Continuous>  metoprolol tartrate 25 milliGRAM(s) Oral every 8 hours  pantoprazole    Tablet 40 milliGRAM(s) Oral before breakfast  polyethylene glycol 3350 17 Gram(s) Oral daily  senna 2 Tablet(s) Oral at bedtime  sodium chloride 0.9%. 1000 milliLiter(s) (10 mL/Hr) IV Continuous <Continuous>  spironolactone 25 milliGRAM(s) Oral daily      Physical Exam  General: Patient comfortable in bed   Vital Signs Last 12 Hrs  T(F): 98.2 (07-24-23 @ 04:50), Max: 98.2 (07-24-23 @ 04:50)  HR: 77 (07-24-23 @ 06:30) (77 - 84)  BP: 114/67 (07-24-23 @ 06:30) (114/67 - 131/78)  BP(mean): --  RR: 18 (07-24-23 @ 04:50) (18 - 18)  SpO2: 94% (07-24-23 @ 04:50) (94% - 94%)    CVS: S1S2   Respiratory: No wheezing, no crepitations  GI: Abdomen soft, bowel sounds positive  Musculoskeletal:  moves all extremities  : Voiding

## 2023-07-25 ENCOUNTER — TRANSCRIPTION ENCOUNTER (OUTPATIENT)
Age: 68
End: 2023-07-25

## 2023-07-25 VITALS
DIASTOLIC BLOOD PRESSURE: 84 MMHG | RESPIRATION RATE: 18 BRPM | OXYGEN SATURATION: 98 % | SYSTOLIC BLOOD PRESSURE: 149 MMHG | TEMPERATURE: 98 F | HEART RATE: 80 BPM

## 2023-07-25 LAB
ALBUMIN SERPL ELPH-MCNC: 3.1 G/DL — LOW (ref 3.3–5)
ALP SERPL-CCNC: 79 U/L — SIGNIFICANT CHANGE UP (ref 40–120)
ALT FLD-CCNC: 45 U/L — SIGNIFICANT CHANGE UP (ref 10–45)
ANION GAP SERPL CALC-SCNC: 12 MMOL/L — SIGNIFICANT CHANGE UP (ref 5–17)
AST SERPL-CCNC: 26 U/L — SIGNIFICANT CHANGE UP (ref 10–40)
BILIRUB SERPL-MCNC: 0.9 MG/DL — SIGNIFICANT CHANGE UP (ref 0.2–1.2)
BUN SERPL-MCNC: 21 MG/DL — SIGNIFICANT CHANGE UP (ref 7–23)
CALCIUM SERPL-MCNC: 8.5 MG/DL — SIGNIFICANT CHANGE UP (ref 8.4–10.5)
CHLORIDE SERPL-SCNC: 106 MMOL/L — SIGNIFICANT CHANGE UP (ref 96–108)
CO2 SERPL-SCNC: 22 MMOL/L — SIGNIFICANT CHANGE UP (ref 22–31)
CREAT SERPL-MCNC: 0.96 MG/DL — SIGNIFICANT CHANGE UP (ref 0.5–1.3)
EGFR: 88 ML/MIN/1.73M2 — SIGNIFICANT CHANGE UP
GLUCOSE BLDC GLUCOMTR-MCNC: 158 MG/DL — HIGH (ref 70–99)
GLUCOSE BLDC GLUCOMTR-MCNC: 165 MG/DL — HIGH (ref 70–99)
GLUCOSE SERPL-MCNC: 108 MG/DL — HIGH (ref 70–99)
HCT VFR BLD CALC: 28.1 % — LOW (ref 39–50)
HGB BLD-MCNC: 8.8 G/DL — LOW (ref 13–17)
MAGNESIUM SERPL-MCNC: 2.1 MG/DL — SIGNIFICANT CHANGE UP (ref 1.6–2.6)
MCHC RBC-ENTMCNC: 26.6 PG — LOW (ref 27–34)
MCHC RBC-ENTMCNC: 31.3 GM/DL — LOW (ref 32–36)
MCV RBC AUTO: 84.9 FL — SIGNIFICANT CHANGE UP (ref 80–100)
NRBC # BLD: 0 /100 WBCS — SIGNIFICANT CHANGE UP (ref 0–0)
PHOSPHATE SERPL-MCNC: 3.2 MG/DL — SIGNIFICANT CHANGE UP (ref 2.5–4.5)
PLATELET # BLD AUTO: 176 K/UL — SIGNIFICANT CHANGE UP (ref 150–400)
POTASSIUM SERPL-MCNC: 3.2 MMOL/L — LOW (ref 3.5–5.3)
POTASSIUM SERPL-MCNC: 3.5 MMOL/L — SIGNIFICANT CHANGE UP (ref 3.5–5.3)
POTASSIUM SERPL-SCNC: 3.2 MMOL/L — LOW (ref 3.5–5.3)
POTASSIUM SERPL-SCNC: 3.5 MMOL/L — SIGNIFICANT CHANGE UP (ref 3.5–5.3)
PROT SERPL-MCNC: 6.1 G/DL — SIGNIFICANT CHANGE UP (ref 6–8.3)
RBC # BLD: 3.31 M/UL — LOW (ref 4.2–5.8)
RBC # FLD: 15.9 % — HIGH (ref 10.3–14.5)
SODIUM SERPL-SCNC: 140 MMOL/L — SIGNIFICANT CHANGE UP (ref 135–145)
T4 FREE SERPL-MCNC: 1.4 NG/DL — SIGNIFICANT CHANGE UP (ref 0.9–1.8)
TSH SERPL-MCNC: 4.23 UIU/ML — HIGH (ref 0.27–4.2)
WBC # BLD: 8.16 K/UL — SIGNIFICANT CHANGE UP (ref 3.8–10.5)
WBC # FLD AUTO: 8.16 K/UL — SIGNIFICANT CHANGE UP (ref 3.8–10.5)

## 2023-07-25 PROCEDURE — 97129 THER IVNTJ 1ST 15 MIN: CPT

## 2023-07-25 PROCEDURE — 83605 ASSAY OF LACTIC ACID: CPT

## 2023-07-25 PROCEDURE — 97116 GAIT TRAINING THERAPY: CPT

## 2023-07-25 PROCEDURE — 82803 BLOOD GASES ANY COMBINATION: CPT

## 2023-07-25 PROCEDURE — 86901 BLOOD TYPING SEROLOGIC RH(D): CPT

## 2023-07-25 PROCEDURE — 97130 THER IVNTJ EA ADDL 15 MIN: CPT

## 2023-07-25 PROCEDURE — 84100 ASSAY OF PHOSPHORUS: CPT

## 2023-07-25 PROCEDURE — C1769: CPT

## 2023-07-25 PROCEDURE — 80053 COMPREHEN METABOLIC PANEL: CPT

## 2023-07-25 PROCEDURE — 85025 COMPLETE CBC W/AUTO DIFF WBC: CPT

## 2023-07-25 PROCEDURE — 85384 FIBRINOGEN ACTIVITY: CPT

## 2023-07-25 PROCEDURE — C1889: CPT

## 2023-07-25 PROCEDURE — 94002 VENT MGMT INPAT INIT DAY: CPT

## 2023-07-25 PROCEDURE — 82962 GLUCOSE BLOOD TEST: CPT

## 2023-07-25 PROCEDURE — 84484 ASSAY OF TROPONIN QUANT: CPT

## 2023-07-25 PROCEDURE — P9100: CPT

## 2023-07-25 PROCEDURE — 85730 THROMBOPLASTIN TIME PARTIAL: CPT

## 2023-07-25 PROCEDURE — 83880 ASSAY OF NATRIURETIC PEPTIDE: CPT

## 2023-07-25 PROCEDURE — 82248 BILIRUBIN DIRECT: CPT

## 2023-07-25 PROCEDURE — 82565 ASSAY OF CREATININE: CPT

## 2023-07-25 PROCEDURE — 86900 BLOOD TYPING SEROLOGIC ABO: CPT

## 2023-07-25 PROCEDURE — 93005 ELECTROCARDIOGRAM TRACING: CPT

## 2023-07-25 PROCEDURE — 97162 PT EVAL MOD COMPLEX 30 MIN: CPT

## 2023-07-25 PROCEDURE — 80074 ACUTE HEPATITIS PANEL: CPT

## 2023-07-25 PROCEDURE — 86850 RBC ANTIBODY SCREEN: CPT

## 2023-07-25 PROCEDURE — 85610 PROTHROMBIN TIME: CPT

## 2023-07-25 PROCEDURE — P9037: CPT

## 2023-07-25 PROCEDURE — 84132 ASSAY OF SERUM POTASSIUM: CPT

## 2023-07-25 PROCEDURE — 82947 ASSAY GLUCOSE BLOOD QUANT: CPT

## 2023-07-25 PROCEDURE — 97535 SELF CARE MNGMENT TRAINING: CPT

## 2023-07-25 PROCEDURE — 82553 CREATINE MB FRACTION: CPT

## 2023-07-25 PROCEDURE — C1751: CPT

## 2023-07-25 PROCEDURE — 85576 BLOOD PLATELET AGGREGATION: CPT

## 2023-07-25 PROCEDURE — 97166 OT EVAL MOD COMPLEX 45 MIN: CPT

## 2023-07-25 PROCEDURE — P9012: CPT

## 2023-07-25 PROCEDURE — 85027 COMPLETE CBC AUTOMATED: CPT

## 2023-07-25 PROCEDURE — 84443 ASSAY THYROID STIM HORMONE: CPT

## 2023-07-25 PROCEDURE — 85018 HEMOGLOBIN: CPT

## 2023-07-25 PROCEDURE — 84436 ASSAY OF TOTAL THYROXINE: CPT

## 2023-07-25 PROCEDURE — 82330 ASSAY OF CALCIUM: CPT

## 2023-07-25 PROCEDURE — 84295 ASSAY OF SERUM SODIUM: CPT

## 2023-07-25 PROCEDURE — P9016: CPT

## 2023-07-25 PROCEDURE — 84439 ASSAY OF FREE THYROXINE: CPT

## 2023-07-25 PROCEDURE — 86923 COMPATIBILITY TEST ELECTRIC: CPT

## 2023-07-25 PROCEDURE — 87641 MR-STAPH DNA AMP PROBE: CPT

## 2023-07-25 PROCEDURE — 36415 COLL VENOUS BLD VENIPUNCTURE: CPT

## 2023-07-25 PROCEDURE — 81003 URINALYSIS AUTO W/O SCOPE: CPT

## 2023-07-25 PROCEDURE — 71045 X-RAY EXAM CHEST 1 VIEW: CPT

## 2023-07-25 PROCEDURE — 87640 STAPH A DNA AMP PROBE: CPT

## 2023-07-25 PROCEDURE — 84480 ASSAY TRIIODOTHYRONINE (T3): CPT

## 2023-07-25 PROCEDURE — 86965 POOLING BLOOD PLATELETS: CPT

## 2023-07-25 PROCEDURE — 85014 HEMATOCRIT: CPT

## 2023-07-25 PROCEDURE — 94010 BREATHING CAPACITY TEST: CPT

## 2023-07-25 PROCEDURE — 85520 HEPARIN ASSAY: CPT

## 2023-07-25 PROCEDURE — 93880 EXTRACRANIAL BILAT STUDY: CPT

## 2023-07-25 PROCEDURE — P9045: CPT

## 2023-07-25 PROCEDURE — 82435 ASSAY OF BLOOD CHLORIDE: CPT

## 2023-07-25 PROCEDURE — 97530 THERAPEUTIC ACTIVITIES: CPT

## 2023-07-25 PROCEDURE — 86891 AUTOLOGOUS BLOOD OP SALVAGE: CPT

## 2023-07-25 PROCEDURE — 82550 ASSAY OF CK (CPK): CPT

## 2023-07-25 PROCEDURE — C8929: CPT

## 2023-07-25 PROCEDURE — 83735 ASSAY OF MAGNESIUM: CPT

## 2023-07-25 RX ORDER — SENNA PLUS 8.6 MG/1
2 TABLET ORAL
Qty: 0 | Refills: 0 | DISCHARGE
Start: 2023-07-25

## 2023-07-25 RX ORDER — SITAGLIPTIN AND METFORMIN HYDROCHLORIDE 500; 50 MG/1; MG/1
1 TABLET, FILM COATED ORAL
Qty: 60 | Refills: 0
Start: 2023-07-25 | End: 2023-08-23

## 2023-07-25 RX ORDER — POTASSIUM CHLORIDE 20 MEQ
20 PACKET (EA) ORAL ONCE
Refills: 0 | Status: COMPLETED | OUTPATIENT
Start: 2023-07-25 | End: 2023-07-25

## 2023-07-25 RX ORDER — INSULIN LISPRO 100/ML
6 VIAL (ML) SUBCUTANEOUS
Refills: 0 | Status: DISCONTINUED | OUTPATIENT
Start: 2023-07-25 | End: 2023-07-25

## 2023-07-25 RX ORDER — INSULIN GLARGINE 100 [IU]/ML
25 INJECTION, SOLUTION SUBCUTANEOUS AT BEDTIME
Refills: 0 | Status: DISCONTINUED | OUTPATIENT
Start: 2023-07-25 | End: 2023-07-25

## 2023-07-25 RX ORDER — ACETAMINOPHEN 500 MG
2 TABLET ORAL
Qty: 0 | Refills: 0 | DISCHARGE
Start: 2023-07-25

## 2023-07-25 RX ORDER — POTASSIUM CHLORIDE 20 MEQ
20 PACKET (EA) ORAL
Refills: 0 | Status: COMPLETED | OUTPATIENT
Start: 2023-07-25 | End: 2023-07-25

## 2023-07-25 RX ORDER — INSULIN LISPRO 100/ML
8 VIAL (ML) SUBCUTANEOUS
Refills: 0 | Status: DISCONTINUED | OUTPATIENT
Start: 2023-07-25 | End: 2023-07-25

## 2023-07-25 RX ORDER — GLIMEPIRIDE 1 MG
1 TABLET ORAL
Qty: 60 | Refills: 0
Start: 2023-07-25 | End: 2023-08-23

## 2023-07-25 RX ORDER — OXYCODONE HYDROCHLORIDE 5 MG/1
1 TABLET ORAL
Qty: 20 | Refills: 0
Start: 2023-07-25 | End: 2023-07-29

## 2023-07-25 RX ORDER — METOPROLOL TARTRATE 50 MG
1 TABLET ORAL
Qty: 60 | Refills: 0
Start: 2023-07-25 | End: 2023-08-23

## 2023-07-25 RX ADMIN — Medication 81 MILLIGRAM(S): at 11:18

## 2023-07-25 RX ADMIN — Medication 20 MILLIEQUIVALENT(S): at 06:34

## 2023-07-25 RX ADMIN — Medication 100 MILLIGRAM(S): at 05:32

## 2023-07-25 RX ADMIN — AMLODIPINE BESYLATE 5 MILLIGRAM(S): 2.5 TABLET ORAL at 05:32

## 2023-07-25 RX ADMIN — Medication 50 MILLIGRAM(S): at 05:31

## 2023-07-25 RX ADMIN — SPIRONOLACTONE 25 MILLIGRAM(S): 25 TABLET, FILM COATED ORAL at 05:32

## 2023-07-25 RX ADMIN — Medication 20 MILLIEQUIVALENT(S): at 13:55

## 2023-07-25 RX ADMIN — Medication 40 MILLIGRAM(S): at 05:32

## 2023-07-25 RX ADMIN — Medication 20 MILLIEQUIVALENT(S): at 08:06

## 2023-07-25 RX ADMIN — Medication 1: at 08:09

## 2023-07-25 RX ADMIN — ENOXAPARIN SODIUM 40 MILLIGRAM(S): 100 INJECTION SUBCUTANEOUS at 05:32

## 2023-07-25 RX ADMIN — PANTOPRAZOLE SODIUM 40 MILLIGRAM(S): 20 TABLET, DELAYED RELEASE ORAL at 05:32

## 2023-07-25 RX ADMIN — Medication 8 UNIT(S): at 08:10

## 2023-07-25 NOTE — PROGRESS NOTE ADULT - ASSESSMENT
67-year-old male with NIDDM2, HTN, HLD, asthma, CPAP on LUCRECIA, sent to the ED by Dr. Hart/cardio  for work-up of EKG abnormalities s/p CABG now.  neuro called for AMS post op   Cardiac cath done on 7/14/23 showed - LHC: pLAD 90%, D1 95%, mLCx 90%, mRCA 90%, LVEDP 15   now s/p CABG   A1c 7 LDL 57  CD 7/18/23 no significant hemodynamic significant stenosis   Tmax 100.2   NIHSS 2 premrs 0    Impression:   AMS of unclear etiology but non focal exam. possible toxic metabolic in nature ; seems improving     - can check CTH if change in mental status, seems to be improving   - r/o infection   - c/w asa and statin therapy  - unable for MRI at present  - if no improvement will considser further workup with rEEG in future  ; can defer for now   - telemetry  - PT/OT   - check FS, glucose control <180  - GI/DVT ppx- Thank you for allowing me to participate in the care of this patient. Call with questions.   - sean cooking   Donavan Gonzalez MD  Vascular Neurology  Office: [phone]

## 2023-07-25 NOTE — PROGRESS NOTE ADULT - PROBLEM SELECTOR PLAN 1
Will decrease Lantus to 25 u at bedtime.  Will decrease Admelog to 8 u before breakfast, 6 u Lunch, 6u for Dinner  Continue Admelog correction scale coverage.   Will continue monitoring FS, log, and glucose trends, will Follow up.  Patient counseled for compliance with consistent low carb diet and exercise as tolerated outpatient. Will decrease Lantus to 25 u at bedtime.  Will decrease Admelog to 8 u before breakfast, 6 u Lunch, 6u for Dinner  Continue Admelog correction scale coverage.   Will continue monitoring FS, log, and glucose trends, will Follow up.  Patient counseled for compliance with consistent low carb diet and exercise as tolerated outpatient.    May get DC on Janumet 50/1000 mg BID and Glimepiride 2 mg BID with meals.   FU outpatient.

## 2023-07-25 NOTE — DISCHARGE NOTE PROVIDER - NSDCMRMEDTOKEN_GEN_ALL_CORE_FT
acetaminophen: 2 cap(s) orally once a day (at bedtime)  amLODIPine 10 mg oral tablet: 1 tab(s) orally once a day  aspirin 81 mg oral delayed release tablet: 1 tab(s) orally once a day  ATORVASTATIN 40MG TAB: 1 tab(s) orally once a day  furosemide 40 mg oral tablet: 1 tab(s) orally once a day  glimepiride 2 mg oral tablet: 1 tab(s) orally 2 times a day  metoprolol tartrate 50 mg oral tablet: 1 tab(s) orally 2 times a day  oxyCODONE 5 mg oral tablet: 1 tab(s) orally every 6 hours as needed for  moderate pain MDD: 20  pantoprazole 40 mg oral delayed release tablet: 1 tab(s) orally once a day (before a meal)  Potassium Chloride (Eqv-Klor-Con 10) 10 mEq oral tablet, extended release: 1 tab(s) orally once a day  senna leaf extract oral tablet: 2 tab(s) orally once a day (at bedtime)  sitagliptin-metformin 50 mg-1000 mg oral tablet: 1 tab(s) orally 2 times a day  sitagliptin-metformin 50 mg-1000 mg oral tablet: 1 tab(s) orally 2 times a day  spironolactone 25 mg oral tablet: 1 tab(s) orally once a day  TAMSULOSIN CAPS 0.4 MG     ACT: TAKE 1 CAPSULE BY MOUTH DAILY AT BEDTIME   acetaminophen: 2 cap(s) orally once a day (at bedtime)  amLODIPine 10 mg oral tablet: 1 tab(s) orally once a day  aspirin 81 mg oral delayed release tablet: 1 tab(s) orally once a day  ATORVASTATIN 40MG TAB: 1 tab(s) orally once a day  furosemide 40 mg oral tablet: 1 tab(s) orally once a day  glimepiride 2 mg oral tablet: 1 tab(s) orally 2 times a day  metoprolol tartrate 50 mg oral tablet: 1 tab(s) orally 2 times a day  oxyCODONE 5 mg oral tablet: 1 tab(s) orally every 6 hours as needed for  moderate pain MDD: 20  pantoprazole 40 mg oral delayed release tablet: 1 tab(s) orally once a day (before a meal)  Potassium Chloride (Eqv-Klor-Con 10) 10 mEq oral tablet, extended release: 1 tab(s) orally once a day  senna leaf extract oral tablet: 2 tab(s) orally once a day (at bedtime)  sitagliptin-metformin 50 mg-1000 mg oral tablet: 1 tab(s) orally 2 times a day  spironolactone 25 mg oral tablet: 1 tab(s) orally once a day  TAMSULOSIN CAPS 0.4 MG     ACT: TAKE 1 CAPSULE BY MOUTH DAILY AT BEDTIME

## 2023-07-25 NOTE — PROGRESS NOTE ADULT - NS ATTEND AMEND GEN_ALL_CORE FT
Patient seen, lab reviewed, plan of care discussed with the NP.
patient seen, lab reviewed and plan of care discussed with the NP
plan of care discussed with the NP
Chart, labs, vitals, radiology reviewed. Above H&P reviewed and edited where appropriate. Agree with history and physical exam. Agree with assessment and plan. I reviewed the overnight course of events and discussed the care with the patient/ family.  All the decisions in assessment and plan are solely made by me..
Patient seen, Lab reviewed, plan of care discussed with the NP
patient seen , lab reviewed, plan of care discussed with the NP

## 2023-07-25 NOTE — DISCHARGE NOTE PROVIDER - NSDCCPCAREPLAN_GEN_ALL_CORE_FT
PRINCIPAL DISCHARGE DIAGNOSIS  Diagnosis: CAD (coronary artery disease)  Assessment and Plan of Treatment:       SECONDARY DISCHARGE DIAGNOSES  Diagnosis: DM (diabetes mellitus)  Assessment and Plan of Treatment:

## 2023-07-25 NOTE — PROGRESS NOTE ADULT - SUBJECTIVE AND OBJECTIVE BOX
Chief complaint  Patient is a 67y old  Male who presents with a chief complaint of 3 VCAD (24 Jul 2023 13:23)         Labs and Fingersticks  CAPILLARY BLOOD GLUCOSE      POCT Blood Glucose.: 165 mg/dL (24 Jul 2023 21:54)  POCT Blood Glucose.: 62 mg/dL (24 Jul 2023 21:25)  POCT Blood Glucose.: 63 mg/dL (24 Jul 2023 21:24)  POCT Blood Glucose.: 97 mg/dL (24 Jul 2023 16:31)  POCT Blood Glucose.: 166 mg/dL (24 Jul 2023 11:44)  POCT Blood Glucose.: 167 mg/dL (24 Jul 2023 07:56)      Anion Gap: 12 (07-25 @ 05:30)  Anion Gap: 12 (07-24 @ 05:48)      Calcium: 8.5 (07-25 @ 05:30)  Calcium: 8.4 (07-24 @ 05:48)  Albumin: 3.1 *L* (07-25 @ 05:30)  Albumin: 3.2 *L* (07-24 @ 05:48)    Alanine Aminotransferase (ALT/SGPT): 45 (07-25 @ 05:30)  Alanine Aminotransferase (ALT/SGPT): 50 *H* (07-24 @ 05:48)  Alkaline Phosphatase: 79 (07-25 @ 05:30)  Alkaline Phosphatase: 84 (07-24 @ 05:48)  Aspartate Aminotransferase (AST/SGOT): 26 (07-25 @ 05:30)  Aspartate Aminotransferase (AST/SGOT): 29 (07-24 @ 05:48)        07-25    140  |  106  |  21  ----------------------------<  108<H>  3.2<L>   |  22  |  0.96    Ca    8.5      25 Jul 2023 05:30  Phos  3.2     07-25  Mg     2.1     07-25    TPro  6.1  /  Alb  3.1<L>  /  TBili  0.9  /  DBili  x   /  AST  26  /  ALT  45  /  AlkPhos  79  07-25                        8.8    8.16  )-----------( 176      ( 25 Jul 2023 05:31 )             28.1     Medications  MEDICATIONS  (STANDING):  amLODIPine   Tablet 5 milliGRAM(s) Oral daily  aspirin enteric coated 81 milliGRAM(s) Oral daily  atorvastatin 80 milliGRAM(s) Oral at bedtime  bisacodyl Suppository 10 milliGRAM(s) Rectal once  chlorhexidine 2% Cloths 1 Application(s) Topical daily  colchicine 0.6 milliGRAM(s) Oral daily  enoxaparin Injectable 40 milliGRAM(s) SubCutaneous every 24 hours  furosemide    Tablet 40 milliGRAM(s) Oral daily  insulin glargine Injectable (LANTUS) 25 Unit(s) SubCutaneous at bedtime  insulin lispro (ADMELOG) corrective regimen sliding scale   SubCutaneous Before meals and at bedtime  insulin lispro Injectable (ADMELOG) 6 Unit(s) SubCutaneous with lunch  insulin lispro Injectable (ADMELOG) 8 Unit(s) SubCutaneous before breakfast  insulin lispro Injectable (ADMELOG) 6 Unit(s) SubCutaneous before lunch  metoprolol tartrate 50 milliGRAM(s) Oral two times a day  pantoprazole    Tablet 40 milliGRAM(s) Oral before breakfast  polyethylene glycol 3350 17 Gram(s) Oral daily  potassium chloride    Tablet ER 20 milliEquivalent(s) Oral every 2 hours  senna 2 Tablet(s) Oral at bedtime  sodium chloride 0.9%. 1000 milliLiter(s) (10 mL/Hr) IV Continuous <Continuous>  spironolactone 25 milliGRAM(s) Oral daily      Physical Exam  General: Patient comfortable in bed   Vital Signs Last 12 Hrs  T(F): 98.4 (07-25-23 @ 04:19), Max: 98.4 (07-25-23 @ 04:19)  HR: 81 (07-25-23 @ 04:19) (76 - 81)  BP: 122/75 (07-25-23 @ 04:19) (121/74 - 122/75)  BP(mean): --  RR: 20 (07-25-23 @ 04:19) (18 - 20)  SpO2: 95% (07-25-23 @ 04:19) (95% - 97%)    CVS: S1S2   Respiratory: No wheezing, no crepitations  GI: Abdomen soft, bowel sounds positive  Musculoskeletal:  moves all extremities  : Voiding

## 2023-07-25 NOTE — PROGRESS NOTE ADULT - PROVIDER SPECIALTY LIST ADULT
Critical Care
Internal Medicine
Internal Medicine
CT Surgery
CT Surgery
Cardiology
Cardiology
Internal Medicine
Internal Medicine
Neurology
Cardiology
Critical Care
Internal Medicine
Internal Medicine
Neurology
Endocrinology

## 2023-07-25 NOTE — PROGRESS NOTE ADULT - ASSESSMENT
Assessment  DMT2: 67y Male with DM T2 with hyperglycemia, A1C 7%, postop IV insulin, extubated and  diet advanced. Transferred to stepdown, glucose elevated, now had hypoglycemic episode insulin adjusted.   Will need tight glycemic control for postop wound healing.   CAD: on medications, stable, monitored. s/p CABG  ?Hypothyroidism: Elevated TSH 9, Free thyroxine levels not available, pending, no hx of thyroid disease.   HTN: on antihypertensive medications, monitored, asymptomatic.  HLD: on high intensity statin      Discussed plan and management with Attending   Addi Pelayo NP - TEAMS  Dr Yonatan Zapata  [phone]

## 2023-07-25 NOTE — DISCHARGE NOTE PROVIDER - CARE PROVIDER_API CALL
[facility]  [facility]  [address] [place] [id number]  Phone: [phone]  Fax: [phone]  Follow Up Time:     Pete Dunn)  EndocrinologyMetabDiabetes; Internal Medicine  [address] [id number]  Phone: [phone]  Fax: [phone]  Follow Up Time:

## 2023-07-25 NOTE — DISCHARGE NOTE PROVIDER - HOSPITAL COURSE
67-year-old male with NIDDM2, HTN, HLD, asthma, CPAP on LUCRECIA, sent to the ED by Dr. Hart for work-up of EKG abnormalities. pain is described as a pressure over left and right anterior chest wall, radiating to R shoulder, 10/10 in severity, lasting 15-30 minutes at a time, worse with exertion, associated with diaphoresis and without any associated nausea, shortness of breath or palpitations.   Cardiac cath done on 7/14/23 showed - LHC: pLAD 90%, D1 95%, mLCx 90%, mRCA 90%, LVEDP 15, Transferred to [facility] for CABg eval w/ Dr Hernandez  7/19   CABG  post op High flow,       with CPAP,   post op anemia  insulin gtt  7/23    2 yu  lt pl chest tube   NSR  lop 25 q8   colchicine for pl eff   hyperglycemic  with endo following with insulin adjusted  7/24 Ambulated Replete potassium ASA statin betablocker

## 2023-07-25 NOTE — PROGRESS NOTE ADULT - PROBLEM SELECTOR PLAN 2
On medications,  no chest pain, stable, monitored and followed up by primary cardiothoracic team/cardiology team. s/p cabg
sp  CABG x 5   lop 25 q8   diuresis lasix 40 qd  colchicine  fo r pl eff  now on room air
Neuro following  Improved  Avoid narcotics
On medications,  no chest pain, stable, monitored and followed up by primary cardiothoracic team/cardiology team. s/p cabg

## 2023-07-25 NOTE — DISCHARGE NOTE PROVIDER - NSDCHHNEEDSERVICE_GEN_ALL_CORE
Medication teaching and assessment/Wound care and assessment
Simple: Patient demonstrates quick and easy understanding/Verbalized Understanding

## 2023-07-25 NOTE — PROGRESS NOTE ADULT - PROBLEM SELECTOR PLAN 3
Will continue statin, primary team FU.
Will continue statin, primary team FU.
sp  CABG x 5  diuresis lasix 40 qd  colchicine  for effusion   Statin ASA  lop50 BID CAD  Ambulate 4x daily  Incentive spirometer  Isolate PW  Home Wed
Will continue statin, primary team FU.

## 2023-07-25 NOTE — DISCHARGE NOTE PROVIDER - NSDCFUADDAPPT_GEN_ALL_CORE_FT
see Dr Hall    8/.8   12 pm  see your cardiologist 1-2 wks    see your endocrinologist  or   Dr elder

## 2023-07-25 NOTE — PROGRESS NOTE ADULT - PROBLEM SELECTOR PROBLEM 2
Triple vessel disease of the heart
R/O Triple vessel disease of the heart
Triple vessel disease of the heart
Mental confusion
Triple vessel disease of the heart

## 2023-07-25 NOTE — PROGRESS NOTE ADULT - SUBJECTIVE AND OBJECTIVE BOX
Cardiovascular Disease Progress Note    Overnight events: No acute events overnight.  no new cardiac sx  Otherwise review of systems negative    Objective Findings:  T(C): 36.9 (23 @ 04:19), Max: 36.9 (23 @ 04:19)  HR: 81 (23 @ 04:19) (70 - 81)  BP: 122/75 (23 @ 04:19) (121/74 - 147/81)  RR: 20 (23 @ 04:19) (18 - 20)  SpO2: 95% (23 @ 04:19) (95% - 97%)  Wt(kg): --  Daily     Daily Weight in k.5 (2023 07:39)      Physical Exam:  Gen: NAD  HEENT: EOMI  CV: RRR, normal S1 + S2, no m/r/g  Lungs: CTAB  Abd: soft, non-tender  Ext: No edema    Telemetry:    Laboratory Data:                        8.8    8.16  )-----------( 176      ( 2023 05:31 )             28.1         140  |  106  |  21  ----------------------------<  108<H>  3.2<L>   |  22  |  0.96    Ca    8.5      2023 05:30  Phos  3.2       Mg     2.1         TPro  6.1  /  Alb  3.1<L>  /  TBili  0.9  /  DBili  x   /  AST  26  /  ALT  45  /  AlkPhos  79                Inpatient Medications:  MEDICATIONS  (STANDING):  amLODIPine   Tablet 5 milliGRAM(s) Oral daily  aspirin enteric coated 81 milliGRAM(s) Oral daily  atorvastatin 80 milliGRAM(s) Oral at bedtime  bisacodyl Suppository 10 milliGRAM(s) Rectal once  chlorhexidine 2% Cloths 1 Application(s) Topical daily  colchicine 0.6 milliGRAM(s) Oral daily  enoxaparin Injectable 40 milliGRAM(s) SubCutaneous every 24 hours  furosemide    Tablet 40 milliGRAM(s) Oral daily  insulin glargine Injectable (LANTUS) 25 Unit(s) SubCutaneous at bedtime  insulin lispro (ADMELOG) corrective regimen sliding scale   SubCutaneous Before meals and at bedtime  insulin lispro Injectable (ADMELOG) 6 Unit(s) SubCutaneous with lunch  insulin lispro Injectable (ADMELOG) 8 Unit(s) SubCutaneous before breakfast  insulin lispro Injectable (ADMELOG) 6 Unit(s) SubCutaneous before lunch  metoprolol tartrate 50 milliGRAM(s) Oral two times a day  pantoprazole    Tablet 40 milliGRAM(s) Oral before breakfast  polyethylene glycol 3350 17 Gram(s) Oral daily  potassium chloride    Tablet ER 20 milliEquivalent(s) Oral every 2 hours  senna 2 Tablet(s) Oral at bedtime  sodium chloride 0.9%. 1000 milliLiter(s) (10 mL/Hr) IV Continuous <Continuous>  spironolactone 25 milliGRAM(s) Oral daily      Assessment:  67-year-old male with NIDDM2, HTN, HLD, asthma, CPAP on LUCRECIA presents with unstable angina now s/p cabg    Recs:  s/p cabg under the excellent care of dr marquis  cw asa statin and antianginals  care per cts  pain control  maintain euvolemic. on lasix 40 and aldactone 25  f/u neuro  dvt ppx  dcp      Over 25 minutes spent on total encounter; more than 50% of the visit was spent counseling and/or coordinating care by the attending physician.      Shaun Nixon MD   Cardiovascular Disease  [phone]

## 2023-07-25 NOTE — PROGRESS NOTE ADULT - PROBLEM SELECTOR PLAN 4
Suggest to continue medications, monitoring, FU primary team recommendations.

## 2023-07-25 NOTE — PROGRESS NOTE ADULT - SUBJECTIVE AND OBJECTIVE BOX
Neurology Progress Note    S: Patient seen and examined  on floor. marysol chu     Medication:  MEDICATIONS  (STANDING):  amLODIPine   Tablet 5 milliGRAM(s) Oral daily  aspirin enteric coated 81 milliGRAM(s) Oral daily  atorvastatin 80 milliGRAM(s) Oral at bedtime  bisacodyl Suppository 10 milliGRAM(s) Rectal once  chlorhexidine 2% Cloths 1 Application(s) Topical daily  colchicine 0.6 milliGRAM(s) Oral daily  enoxaparin Injectable 40 milliGRAM(s) SubCutaneous every 24 hours  furosemide    Tablet 40 milliGRAM(s) Oral daily  insulin glargine Injectable (LANTUS) 28 Unit(s) SubCutaneous at bedtime  insulin lispro (ADMELOG) corrective regimen sliding scale   SubCutaneous Before meals and at bedtime  insulin lispro Injectable (ADMELOG) 12 Unit(s) SubCutaneous before breakfast  insulin lispro Injectable (ADMELOG) 10 Unit(s) SubCutaneous before lunch  insulin lispro Injectable (ADMELOG) 14 Unit(s) SubCutaneous before dinner  insulin regular Infusion 3 Unit(s)/Hr (3 mL/Hr) IV Continuous <Continuous>  metoprolol tartrate 25 milliGRAM(s) Oral every 8 hours  pantoprazole    Tablet 40 milliGRAM(s) Oral before breakfast  polyethylene glycol 3350 17 Gram(s) Oral daily  senna 2 Tablet(s) Oral at bedtime  sodium chloride 0.9%. 1000 milliLiter(s) (10 mL/Hr) IV Continuous <Continuous>  spironolactone 25 milliGRAM(s) Oral daily    MEDICATIONS  (PRN):  acetaminophen     Tablet .. 650 milliGRAM(s) Oral every 6 hours PRN Mild Pain (1 - 3)  oxyCODONE    IR 5 milliGRAM(s) Oral every 4 hours PRN Moderate Pain (4 - 6)  oxyCODONE    IR 10 milliGRAM(s) Oral every 4 hours PRN Severe Pain (7 - 10)    Vitals:  Vital Signs Last 24 Hrs  T(C): 36.4 (07-25-23 @ 12:06), Max: 36.9 (07-25-23 @ 04:19)  T(F): 97.5 (07-25-23 @ 12:06), Max: 98.4 (07-25-23 @ 04:19)  HR: 80 (07-25-23 @ 12:06) (76 - 86)  BP: 149/84 (07-25-23 @ 12:06) (121/74 - 149/84)  BP(mean): --  RR: 18 (07-25-23 @ 12:06) (18 - 20)  SpO2: 98% (07-25-23 @ 12:06) (95% - 98%)          General Exam:   General Appearance: Appropriately dressed and in no acute distress       Head: Normocephalic, atraumatic and no dysmorphic features  Ear, Nose, and Throat: Moist mucous membranes  CVS: S1S2+  Resp: No SOB, no wheeze or rhonchi  GI: soft NT/ND  Extremities: No edema or cyanosis  Skin: No bruises or rashes     Neurological Exam:  Mental Status: Awake, alert and oriented x 2.  Able to follow simple verbal commands. Able to name and repeat. fluent speech. No obvious aphasia minimal dysarthria noted.   Cranial Nerves: PERRL, EOMI, VFFC, sensation V1-V3 intact,  no obvious facial asymmetry, equal elevation of palate, scm/trap 5/5, tongue is midline on protrusion. no obvious papilledema on fundoscopic exam. hearing is grossly intact.   Motor: Normal bulk, tone and strength throughout. Fine finger movements were intact and symmetric. no tremors or drift noted.    Sensation: Intact to light touch and pinprick throughout. no right/left confusion. no extinction to tactile on DSS.    Reflexes: 1+ throughout at biceps, brachioradialis, triceps, patellars and ankles bilaterally and equal. No clonus. R toe and L toe were both downgoing.  Coordination: No dysmetria on FNF   Gait: walked on unit yestesrday    I personally reviewed the below data/images/labs:      LABS:                          8.8    8.16  )-----------( 176      ( 25 Jul 2023 05:31 )             28.1     07-25    x   |  x   |  x   ----------------------------<  x   3.5   |  x   |  x     Ca    8.5      25 Jul 2023 05:30  Phos  3.2     07-25  Mg     2.1     07-25    TPro  6.1  /  Alb  3.1<L>  /  TBili  0.9  /  DBili  x   /  AST  26  /  ALT  45  /  AlkPhos  79  07-25    LIVER FUNCTIONS - ( 25 Jul 2023 05:30 )  Alb: 3.1 g/dL / Pro: 6.1 g/dL / ALK PHOS: 79 U/L / ALT: 45 U/L / AST: 26 U/L / GGT: x             Urinalysis Basic - ( 25 Jul 2023 05:30 )    Color: x / Appearance: x / SG: x / pH: x  Gluc: 108 mg/dL / Ketone: x  / Bili: x / Urobili: x   Blood: x / Protein: x / Nitrite: x   Leuk Esterase: x / RBC: x / WBC x   Sq Epi: x / Non Sq Epi: x / Bacteria: x          Urinalysis Basic - ( 22 Jul 2023 00:56 )    Color: x / Appearance: x / SG: x / pH: x  Gluc: 108 mg/dL / Ketone: x  / Bili: x / Urobili: x   Blood: x / Protein: x / Nitrite: x   Leuk Esterase: x / RBC: x / WBC x   Sq Epi: x / Non Sq Epi: x / Bacteria: x        < from: VA Duplex Carotid, Bilat (07.18.23 @ 10:17) >    ACC: [id number] EXAM:  CAROTID DUPLEX BILATERAL   ORDERED BY: MANSI WILEY     PROCEDURE DATE:  07/18/2023          INTERPRETATION:  CLINICAL INFORMATION: Coronary artery disease, evaluate   for carotid artery disease    COMPARISON: None available.    TECHNIQUE: Grayscale, color and spectral Doppler examination of both   carotid arteries was performed.    FINDINGS:    There is increased tortuosity to the right and the left carotid arteries   in the neck.    Mild atheromatous plaque is present along the course of the carotid   arteries in the neck.    No elevated velocities or abnormal waveforms are encountered.    Peak systolic velocities are as follows:    RIGHT:  PROX CCA = 110  DIST CCA = 55  PROX ICA = 71  DIST ICA = 58  ECA = 115    LEFT:  PROX CCA = 95  DIST CCA = 62  PROX ICA = 79  DIST ICA = 69  ECA = 77    Antegrade flow is noted within both vertebral arteries.    IMPRESSION: No significant hemodynamic stenosis of either carotid artery.    Measurement of carotid stenosis is based on velocity parameters that   correlate the residual internal carotid diameter with that of the more   distal vessel in accordance with a method such as the [place] [employer]   Symptomatic Carotid Endarterectomy Trial (NASCET).    --- End of Report ---            ISAIAS CHIU MD; Attending Radiologist  This document has been electronically signed. Jul 18 2023 10:46AM    < end of copied text >

## 2023-07-25 NOTE — DISCHARGE NOTE NURSING/CASE MANAGEMENT/SOCIAL WORK - PATIENT PORTAL LINK FT
You can access the FollowMyHealth Patient Portal offered by [facility] by registering at the following website: [email or web address]. By joining [employer]’s FollowMyHealth portal, you will also be able to view your health information using other applications (apps) compatible with our system.

## 2023-07-26 ENCOUNTER — APPOINTMENT (OUTPATIENT)
Dept: CARE COORDINATION | Facility: HOME HEALTH | Age: 68
End: 2023-07-26
Payer: MEDICARE

## 2023-07-26 VITALS
DIASTOLIC BLOOD PRESSURE: 80 MMHG | HEART RATE: 77 BPM | SYSTOLIC BLOOD PRESSURE: 116 MMHG | RESPIRATION RATE: 17 BRPM | OXYGEN SATURATION: 98 % | WEIGHT: 177 LBS

## 2023-07-26 LAB — NT-PROBNP SERPL-SCNC: 4153 PG/ML — HIGH (ref 0–300)

## 2023-07-26 PROCEDURE — 99024 POSTOP FOLLOW-UP VISIT: CPT

## 2023-07-26 RX ORDER — FUROSEMIDE 40 MG
1 TABLET ORAL
Qty: 7 | Refills: 0
Start: 2023-07-26 | End: 2023-08-01

## 2023-07-26 RX ORDER — BENZONATATE 100 MG/1
100 CAPSULE ORAL 3 TIMES DAILY
Qty: 12 | Refills: 0 | Status: ACTIVE | COMMUNITY

## 2023-07-26 RX ORDER — TAMSULOSIN HYDROCHLORIDE 0.4 MG/1
0.4 CAPSULE ORAL
Refills: 0 | Status: ACTIVE | COMMUNITY

## 2023-07-26 RX ORDER — METOPROLOL TARTRATE 50 MG/1
50 TABLET, FILM COATED ORAL
Refills: 0 | Status: ACTIVE | COMMUNITY

## 2023-07-26 RX ORDER — ATORVASTATIN CALCIUM 40 MG/1
40 TABLET, FILM COATED ORAL
Refills: 0 | Status: ACTIVE | COMMUNITY

## 2023-07-26 RX ORDER — POTASSIUM CHLORIDE 20 MEQ
2 PACKET (EA) ORAL
Qty: 7 | Refills: 0 | DISCHARGE
Start: 2023-07-26 | End: 2023-08-01

## 2023-07-26 RX ORDER — SITAGLIPTIN AND METFORMIN HYDROCHLORIDE 50; 1000 MG/1; MG/1
50-1000 TABLET, FILM COATED ORAL
Qty: 60 | Refills: 0 | Status: ACTIVE | COMMUNITY

## 2023-07-26 RX ORDER — ASPIRIN 81 MG/1
81 TABLET ORAL
Refills: 0 | Status: ACTIVE | COMMUNITY

## 2023-07-26 RX ORDER — AMLODIPINE BESYLATE 2.5 MG/1
1 TABLET ORAL
Qty: 30 | Refills: 0
Start: 2023-07-26 | End: 2023-08-24

## 2023-07-26 RX ORDER — ACETAMINOPHEN 500 MG/1
TABLET ORAL EVERY 6 HOURS
Refills: 0 | Status: ACTIVE | COMMUNITY

## 2023-07-26 RX ORDER — GLIMEPIRIDE 2 MG/1
2 TABLET ORAL
Refills: 0 | Status: ACTIVE | COMMUNITY

## 2023-07-26 RX ORDER — OXYCODONE HYDROCHLORIDE 5 MG/1
5 CAPSULE ORAL EVERY 6 HOURS
Refills: 0 | Status: ACTIVE | COMMUNITY

## 2023-07-26 RX ORDER — SPIRONOLACTONE 25 MG/1
1 TABLET, FILM COATED ORAL
Qty: 7 | Refills: 0
Start: 2023-07-26 | End: 2023-08-01

## 2023-07-26 RX ORDER — POTASSIUM CHLORIDE 20 MEQ
1 PACKET (EA) ORAL
Qty: 7 | Refills: 0
Start: 2023-07-26 | End: 2023-08-01

## 2023-07-26 NOTE — HISTORY OF PRESENT ILLNESS
[FreeTextEntry1] : 67-year-old male with NIDDM2, HTN, HLD, asthma, CPAP on LUCRECIA, sent to the ED by \par Dr. Hart for work-up of EKG abnormalities. pain is described as a pressure \par over left and right anterior chest wall, radiating to R shoulder, 10/10 in \par severity, lasting 15-30 minutes at a time, worse with exertion, associated with \par diaphoresis and without any associated nausea, shortness of breath or \par palpitations. \par Cardiac cath done on 7/14/23 showed - LHC: pLAD 90%, D1 95%, mLCx 90%, mRCA \par 90%, LVEDP 15, Transferred to [facility] for CABg eval w/ Dr Hernandez \par 7/19 CABG \par post op High flow,   with CPAP, post op anemia \par insulin gtt \par 7/23  2 yu  lt pl chest tube NSR  lop 25 q8 colchicine for pl eff \par  hyperglycemic  with endo following with insulin adjusted \par 7/24 Ambulated Replete potassium ASA statin betablocker \par 7/26 Initial visit completed at home\par \par CC " I am doing ok"

## 2023-07-26 NOTE — PHYSICAL EXAM
[] : no respiratory distress [Exaggerated Use Of Accessory Muscles For Inspiration] : no accessory muscle use [Auscultation Breath Sounds / Voice Sounds] : lungs were clear to auscultation bilaterally [Heart Sounds] : normal S1 and S2 [Chest Visual Inspection Thoracic Asymmetry] : no chest asymmetry [Bowel Sounds] : normal bowel sounds [Oriented To Time, Place, And Person] : oriented to person, place, and time [FreeTextEntry2] : B/L LE +1 edema  [FreeTextEntry1] : RLE SVG site martha and well approximated. No erythema or drainage  noted

## 2023-08-08 ENCOUNTER — APPOINTMENT (OUTPATIENT)
Dept: CARDIOTHORACIC SURGERY | Facility: CLINIC | Age: 68
End: 2023-08-08
Payer: MEDICARE

## 2023-08-08 VITALS
DIASTOLIC BLOOD PRESSURE: 68 MMHG | BODY MASS INDEX: 28.68 KG/M2 | OXYGEN SATURATION: 100 % | HEIGHT: 64 IN | SYSTOLIC BLOOD PRESSURE: 130 MMHG | RESPIRATION RATE: 16 BRPM | WEIGHT: 168 LBS | HEART RATE: 73 BPM

## 2023-08-08 PROCEDURE — 99024 POSTOP FOLLOW-UP VISIT: CPT

## 2023-08-08 RX ORDER — POTASSIUM CHLORIDE 10 MEQ
10 CAPSULE, EXTENDED RELEASE ORAL DAILY
Refills: 0 | Status: COMPLETED | COMMUNITY
End: 2023-08-08

## 2023-08-08 RX ORDER — SPIRONOLACTONE 25 MG/1
25 TABLET ORAL DAILY
Refills: 0 | Status: COMPLETED | COMMUNITY
End: 2023-08-08

## 2023-08-08 RX ORDER — FUROSEMIDE 40 MG/1
40 TABLET ORAL
Qty: 7 | Refills: 0 | Status: COMPLETED | COMMUNITY
End: 2023-08-08

## 2023-08-08 NOTE — REASON FOR VISIT
[de-identified] : CABG  [de-identified] : 7/19/2023 [de-identified] : 67-year-old male with NIDDM2, HTN, HLD, asthma, CPAP on LUCRECIA, sent to the ED by Dr. Nixon for work-up of EKG abnormalities and exertional chest pain. Cardiac cath done on 7/14/23 showed - LHC: pLAD 90%, D1 95%, mLCx 90%, mRCA, 90%, LVEDP 15, Transferred to [facility] for CABG eval w/ Dr Hall  Post op sugar control. colchicine for pl effusion, hyperglycemic with endo following with insulin adjusted, DC home.  Follows Dr. Nixon for cardiology.   He presents today with his sister in law and daughter in law.  Reports feeling well.  Walking more with less SOB.  Continues to have dry cough at times.  Eating and drinking with +BM. Denies chest pain, SOB, swelling, weight gain, palpitations, cough, fever or chills.

## 2023-08-08 NOTE — CONSULT LETTER
[Dear  ___] : Dear  [unfilled], [Courtesy Letter:] : I had the pleasure of seeing your patient, [unfilled], in my office today. [Please see my note below.] : Please see my note below. [Sincerely,] : Sincerely, [FreeTextEntry2] : Dr. Nixon [FreeTextEntry3] : Jackson Hall MD Attending Surgeon Cardiovascular & Thoracic Surgery

## 2023-08-08 NOTE — ASSESSMENT
[FreeTextEntry1] : Today on exam patient's lungs clear bilaterally, normal sinus rhythm, sternum stable, incision clean, dry and intact. Left radial and right LE SVG site are clean, dry and intact. No peripheral edema noted. Instructed patient on importance of optimal glycemic control, daily showering, daily weights, incentive spirometer use, and increase ambulation as tolerated. Instructed to call office with any signs or symptoms of infection or weight gain of 2 or more pounds in 1 day or 3 or more pounds in 1 week.   Plan:  - Continue current medication regimen - Follow up with cardiologist Dr. Nixon Thursday as scheduled - Follow up with PCP  - Continue to walk and increase as tolerated - Low salt diet and fluids discussed in detail - Tight glucose control discussed in detail for wound healing - Return to office PRN - Call with any questions or concerns

## 2023-08-08 NOTE — END OF VISIT
[FreeTextEntry3] : Written by Keaton Kincaid NP, acting as a scribe for Dr. Hall The documentation recorded by the scribe accurately reflects the service I personally performed and the decisions made by me. Signature Jackson Hall MD.

## 2023-08-08 NOTE — PHYSICAL EXAM
[] : no respiratory distress [Respiration, Rhythm And Depth] : normal respiratory rhythm and effort [Exaggerated Use Of Accessory Muscles For Inspiration] : no accessory muscle use [Auscultation Breath Sounds / Voice Sounds] : lungs were clear to auscultation bilaterally [Apical Impulse] : the apical impulse was normal [Heart Rate And Rhythm] : heart rate was normal and rhythm regular [Heart Sounds] : normal S1 and S2 [Murmurs] : no murmurs [Site: ___] : Site: [unfilled] [Clean] : clean [Dry] : dry [Healing Well] : healing well [Bleeding] : no active bleeding [Foul Odor] : no foul smell [Purulent Drainage] : no purulent drainage [Serosanguinous Drainage] : no serosanguinous drainage [Erythema] : not erythematous [Warm] : not warm [Tender] : not tender [No Edema] : no edema

## 2023-08-11 ENCOUNTER — OUTPATIENT (OUTPATIENT)
Dept: OUTPATIENT SERVICES | Facility: HOSPITAL | Age: 68
LOS: 1 days | End: 2023-08-11
Payer: COMMERCIAL

## 2023-08-11 ENCOUNTER — APPOINTMENT (OUTPATIENT)
Dept: RADIOLOGY | Facility: IMAGING CENTER | Age: 68
End: 2023-08-11

## 2023-08-11 DIAGNOSIS — Z90.49 ACQUIRED ABSENCE OF OTHER SPECIFIED PARTS OF DIGESTIVE TRACT: Chronic | ICD-10-CM

## 2023-08-11 DIAGNOSIS — Z00.8 ENCOUNTER FOR OTHER GENERAL EXAMINATION: ICD-10-CM

## 2023-08-11 PROCEDURE — 71046 X-RAY EXAM CHEST 2 VIEWS: CPT | Mod: 26

## 2023-08-11 PROCEDURE — 71046 X-RAY EXAM CHEST 2 VIEWS: CPT

## 2023-09-01 ENCOUNTER — APPOINTMENT (OUTPATIENT)
Dept: RADIOLOGY | Facility: IMAGING CENTER | Age: 68
End: 2023-09-01

## 2023-09-01 ENCOUNTER — OUTPATIENT (OUTPATIENT)
Dept: OUTPATIENT SERVICES | Facility: HOSPITAL | Age: 68
LOS: 1 days | End: 2023-09-01
Payer: COMMERCIAL

## 2023-09-01 DIAGNOSIS — Z90.49 ACQUIRED ABSENCE OF OTHER SPECIFIED PARTS OF DIGESTIVE TRACT: Chronic | ICD-10-CM

## 2023-09-01 DIAGNOSIS — Z00.8 ENCOUNTER FOR OTHER GENERAL EXAMINATION: ICD-10-CM

## 2023-09-01 PROCEDURE — 71046 X-RAY EXAM CHEST 2 VIEWS: CPT | Mod: 26

## 2023-09-01 PROCEDURE — 71046 X-RAY EXAM CHEST 2 VIEWS: CPT

## 2023-09-03 NOTE — PROGRESS NOTE ADULT - ASSESSMENT
67-year-old male with NIDDM2, HTN, HLD, asthma, CPAP on LUCRECIA, sent to the ED by Dr. Hart for work-up of EKG abnormalities.       Problem/Plan - 1:  ·  Problem: Chest pain witrh TV CAD.   ·  Plan: Awaiting transfer to [facility] for possible CABG.   negative delta trop, will repeat with AM labs  ST depressions on lateral leads, per ED notes plan was for ischemic eval with Dr. Isaacs, would call in AM   check TTE  monitor on tele  check A1c, FLP, TSH  repeat CE/EKG if experiences chest pain  pro-BNP wnl for age  c/w ASA, statin.     Problem/Plan - 2:  ·  Problem: Hypoglycemia associated with type 2 diabetes mellitus.   ·  Plan: Sugars fine.   NPO pending cardiology eval, FS Q6H  hold oral meds while inpatient  FS Q6H w/ISS.     Problem/Plan - 3:  ·  Problem: LUCRECIA on CPAP.   ·  Plan: confirm home settings with pulmonologist in AM.     Problem/Plan - 4:  ·  Problem: Medication management.   ·  Plan: clinical pharmacist emailed for assistance with medication reconciliation.     Problem/Plan - 5:  ·  Problem: Need for prophylactic measure.   ·  Plan: HSQ for DVT ppx.   no

## 2023-10-06 ENCOUNTER — APPOINTMENT (OUTPATIENT)
Dept: GASTROENTEROLOGY | Facility: CLINIC | Age: 68
End: 2023-10-06
Payer: MEDICARE

## 2023-10-06 VITALS
HEIGHT: 64 IN | HEART RATE: 94 BPM | RESPIRATION RATE: 17 BRPM | TEMPERATURE: 98.1 F | BODY MASS INDEX: 29.37 KG/M2 | DIASTOLIC BLOOD PRESSURE: 69 MMHG | WEIGHT: 172 LBS | SYSTOLIC BLOOD PRESSURE: 119 MMHG | OXYGEN SATURATION: 98 %

## 2023-10-06 DIAGNOSIS — Z78.9 OTHER SPECIFIED HEALTH STATUS: ICD-10-CM

## 2023-10-06 DIAGNOSIS — D64.9 ANEMIA, UNSPECIFIED: ICD-10-CM

## 2023-10-06 DIAGNOSIS — I10 ESSENTIAL (PRIMARY) HYPERTENSION: ICD-10-CM

## 2023-10-06 PROCEDURE — 99204 OFFICE O/P NEW MOD 45 MIN: CPT

## 2023-10-12 ENCOUNTER — NON-APPOINTMENT (OUTPATIENT)
Age: 68
End: 2023-10-12

## 2023-10-13 ENCOUNTER — NON-APPOINTMENT (OUTPATIENT)
Age: 68
End: 2023-10-13

## 2023-10-17 LAB
HCT VFR BLD CALC: 38.6 %
HEMOCCULT STL QL IA: NEGATIVE
HGB BLD-MCNC: 11.2 G/DL
IRON SATN MFR SERPL: 11 %
IRON SERPL-MCNC: 39 UG/DL
MCHC RBC-ENTMCNC: 23.4 PG
MCHC RBC-ENTMCNC: 29 GM/DL
MCV RBC AUTO: 80.6 FL
PLATELET # BLD AUTO: 216 K/UL
RBC # BLD: 4.79 M/UL
RBC # FLD: 18.2 %
TIBC SERPL-MCNC: 362 UG/DL
UIBC SERPL-MCNC: 322 UG/DL
WBC # FLD AUTO: 5.38 K/UL

## 2023-10-25 LAB — FERRITIN SERPL-MCNC: 18 NG/ML

## 2023-11-03 ENCOUNTER — APPOINTMENT (OUTPATIENT)
Dept: GASTROENTEROLOGY | Facility: CLINIC | Age: 68
End: 2023-11-03
Payer: MEDICARE

## 2023-11-03 VITALS
DIASTOLIC BLOOD PRESSURE: 82 MMHG | TEMPERATURE: 97.6 F | WEIGHT: 173 LBS | HEIGHT: 64 IN | BODY MASS INDEX: 29.53 KG/M2 | SYSTOLIC BLOOD PRESSURE: 154 MMHG | OXYGEN SATURATION: 99 % | HEART RATE: 62 BPM

## 2023-11-03 DIAGNOSIS — Z09 ENCOUNTER FOR FOLLOW-UP EXAMINATION AFTER COMPLETED TREATMENT FOR CONDITIONS OTHER THAN MALIGNANT NEOPLASM: ICD-10-CM

## 2023-11-03 PROCEDURE — 99214 OFFICE O/P EST MOD 30 MIN: CPT

## 2023-11-03 RX ORDER — OMEPRAZOLE 40 MG/1
40 CAPSULE, DELAYED RELEASE ORAL DAILY
Refills: 0 | Status: DISCONTINUED | COMMUNITY
End: 2023-11-03

## 2023-11-05 RX ORDER — EMPAGLIFLOZIN 10 MG/1
10 TABLET, FILM COATED ORAL
Qty: 30 | Refills: 0 | Status: ACTIVE | COMMUNITY
Start: 2023-04-14

## 2023-11-05 RX ORDER — GLIPIZIDE 5 MG/1
5 TABLET ORAL
Qty: 90 | Refills: 0 | Status: ACTIVE | COMMUNITY
Start: 2023-09-20

## 2023-11-05 RX ORDER — AZITHROMYCIN 250 MG/1
250 TABLET, FILM COATED ORAL
Qty: 6 | Refills: 0 | Status: ACTIVE | COMMUNITY
Start: 2023-08-24

## 2023-11-05 RX ORDER — POTASSIUM CHLORIDE 1500 MG/1
20 TABLET, EXTENDED RELEASE ORAL
Qty: 30 | Refills: 0 | Status: ACTIVE | COMMUNITY
Start: 2023-10-27

## 2023-11-05 RX ORDER — CHOLECALCIFEROL (VITAMIN D3) 10(400)/ML
DROPS ORAL
Qty: 100 | Refills: 0 | Status: ACTIVE | COMMUNITY
Start: 2023-05-06

## 2023-11-05 RX ORDER — ERGOCALCIFEROL 1.25 MG/1
1.25 MG CAPSULE, LIQUID FILLED ORAL
Qty: 4 | Refills: 0 | Status: ACTIVE | COMMUNITY
Start: 2022-10-20

## 2023-11-05 RX ORDER — PEN NEEDLE, DIABETIC 31 GX5/16"
70 NEEDLE, DISPOSABLE MISCELLANEOUS
Qty: 100 | Refills: 0 | Status: ACTIVE | COMMUNITY
Start: 2023-04-14

## 2023-11-05 RX ORDER — PANTOPRAZOLE 40 MG/1
40 TABLET, DELAYED RELEASE ORAL
Qty: 30 | Refills: 0 | Status: ACTIVE | COMMUNITY
Start: 2023-09-05

## 2023-11-05 RX ORDER — LOSARTAN POTASSIUM 50 MG/1
50 TABLET, FILM COATED ORAL
Qty: 30 | Refills: 0 | Status: ACTIVE | COMMUNITY
Start: 2023-09-14

## 2023-11-05 RX ORDER — METFORMIN HYDROCHLORIDE 1000 MG/1
1000 TABLET, COATED ORAL
Qty: 60 | Refills: 0 | Status: ACTIVE | COMMUNITY
Start: 2023-06-18

## 2023-11-05 RX ORDER — GLYCERIN, HYPROMELLOSE, POLYETHYLENE GLYCOL .2; .2; 1 G/100ML; G/100ML; G/100ML
0.2-0.2-1 LIQUID OPHTHALMIC
Qty: 15 | Refills: 0 | Status: ACTIVE | COMMUNITY
Start: 2023-04-14

## 2023-11-05 RX ORDER — CEFDINIR 300 MG/1
300 CAPSULE ORAL
Qty: 20 | Refills: 0 | Status: ACTIVE | COMMUNITY
Start: 2023-08-10

## 2023-11-05 RX ORDER — BLOOD SUGAR DIAGNOSTIC
STRIP MISCELLANEOUS
Qty: 50 | Refills: 0 | Status: ACTIVE | COMMUNITY
Start: 2023-09-05

## 2023-11-05 RX ORDER — ALBUTEROL SULFATE 90 UG/1
108 (90 BASE) INHALANT RESPIRATORY (INHALATION)
Qty: 18 | Refills: 0 | Status: ACTIVE | COMMUNITY
Start: 2023-08-24

## 2023-11-05 RX ORDER — OXYCODONE 5 MG/1
5 TABLET ORAL
Qty: 20 | Refills: 0 | Status: ACTIVE | COMMUNITY
Start: 2023-07-25

## 2023-11-06 ENCOUNTER — NON-APPOINTMENT (OUTPATIENT)
Age: 68
End: 2023-11-06

## 2023-11-07 ENCOUNTER — LABORATORY RESULT (OUTPATIENT)
Age: 68
End: 2023-11-07

## 2023-11-07 ENCOUNTER — APPOINTMENT (OUTPATIENT)
Dept: PULMONOLOGY | Facility: CLINIC | Age: 68
End: 2023-11-07
Payer: MEDICARE

## 2023-11-07 VITALS
WEIGHT: 176.5 LBS | HEIGHT: 66 IN | BODY MASS INDEX: 28.37 KG/M2 | TEMPERATURE: 98.2 F | DIASTOLIC BLOOD PRESSURE: 89 MMHG | SYSTOLIC BLOOD PRESSURE: 162 MMHG | OXYGEN SATURATION: 94 % | HEART RATE: 82 BPM

## 2023-11-07 PROCEDURE — 99214 OFFICE O/P EST MOD 30 MIN: CPT | Mod: 25

## 2023-11-07 PROCEDURE — 71046 X-RAY EXAM CHEST 2 VIEWS: CPT

## 2023-11-08 LAB
ANION GAP SERPL CALC-SCNC: 15 MMOL/L
BASOPHILS # BLD AUTO: 0.03 K/UL
BASOPHILS NFR BLD AUTO: 0.6 %
BUN SERPL-MCNC: 18 MG/DL
CALCIUM SERPL-MCNC: 9.7 MG/DL
CHLORIDE SERPL-SCNC: 102 MMOL/L
CO2 SERPL-SCNC: 25 MMOL/L
CREAT SERPL-MCNC: 1.06 MG/DL
DEPRECATED D DIMER PPP IA-ACNC: 263 NG/ML DDU
EGFR: 77 ML/MIN/1.73M2
EOSINOPHIL # BLD AUTO: 0.07 K/UL
EOSINOPHIL NFR BLD AUTO: 1.5 %
GLUCOSE SERPL-MCNC: 118 MG/DL
HCT VFR BLD CALC: 42.1 %
HGB BLD-MCNC: 13 G/DL
IMM GRANULOCYTES NFR BLD AUTO: 0.2 %
LYMPHOCYTES # BLD AUTO: 1.72 K/UL
LYMPHOCYTES NFR BLD AUTO: 36 %
MAN DIFF?: NORMAL
MCHC RBC-ENTMCNC: 24.4 PG
MCHC RBC-ENTMCNC: 30.9 GM/DL
MCV RBC AUTO: 79 FL
MONOCYTES # BLD AUTO: 0.42 K/UL
MONOCYTES NFR BLD AUTO: 8.8 %
NEUTROPHILS # BLD AUTO: 2.53 K/UL
NEUTROPHILS NFR BLD AUTO: 52.9 %
PLATELET # BLD AUTO: 197 K/UL
POTASSIUM SERPL-SCNC: 4.4 MMOL/L
RBC # BLD: 5.33 M/UL
RBC # FLD: 20.6 %
SODIUM SERPL-SCNC: 141 MMOL/L
WBC # FLD AUTO: 4.78 K/UL

## 2023-11-16 RX ORDER — CHLORHEXIDINE GLUCONATE 4 %
325 (65 FE) LIQUID (ML) TOPICAL
Qty: 60 | Refills: 0 | Status: ACTIVE | COMMUNITY
Start: 2023-10-03 | End: 1900-01-01

## 2023-11-17 ENCOUNTER — APPOINTMENT (OUTPATIENT)
Dept: PULMONOLOGY | Facility: CLINIC | Age: 68
End: 2023-11-17
Payer: MEDICARE

## 2023-11-17 DIAGNOSIS — R79.89 OTHER SPECIFIED ABNORMAL FINDINGS OF BLOOD CHEMISTRY: ICD-10-CM

## 2023-11-18 PROCEDURE — 95800 SLP STDY UNATTENDED: CPT

## 2023-11-19 PROCEDURE — 95800 SLP STDY UNATTENDED: CPT

## 2023-11-24 ENCOUNTER — APPOINTMENT (OUTPATIENT)
Dept: CT IMAGING | Facility: IMAGING CENTER | Age: 68
End: 2023-11-24
Payer: MEDICARE

## 2023-11-24 ENCOUNTER — OUTPATIENT (OUTPATIENT)
Dept: OUTPATIENT SERVICES | Facility: HOSPITAL | Age: 68
LOS: 1 days | End: 2023-11-24
Payer: COMMERCIAL

## 2023-11-24 DIAGNOSIS — Z95.1 PRESENCE OF AORTOCORONARY BYPASS GRAFT: ICD-10-CM

## 2023-11-24 DIAGNOSIS — Z90.49 ACQUIRED ABSENCE OF OTHER SPECIFIED PARTS OF DIGESTIVE TRACT: Chronic | ICD-10-CM

## 2023-11-24 DIAGNOSIS — Z00.8 ENCOUNTER FOR OTHER GENERAL EXAMINATION: ICD-10-CM

## 2023-11-24 PROCEDURE — 71275 CT ANGIOGRAPHY CHEST: CPT | Mod: 26

## 2023-11-24 PROCEDURE — 71275 CT ANGIOGRAPHY CHEST: CPT

## 2023-11-27 ENCOUNTER — NON-APPOINTMENT (OUTPATIENT)
Age: 68
End: 2023-11-27

## 2023-12-19 ENCOUNTER — APPOINTMENT (OUTPATIENT)
Dept: PULMONOLOGY | Facility: CLINIC | Age: 68
End: 2023-12-19
Payer: MEDICARE

## 2023-12-19 VITALS
HEART RATE: 78 BPM | OXYGEN SATURATION: 98 % | DIASTOLIC BLOOD PRESSURE: 76 MMHG | WEIGHT: 176 LBS | BODY MASS INDEX: 28.28 KG/M2 | HEIGHT: 66 IN | TEMPERATURE: 98.2 F | SYSTOLIC BLOOD PRESSURE: 154 MMHG

## 2023-12-19 DIAGNOSIS — R05.9 COUGH, UNSPECIFIED: ICD-10-CM

## 2023-12-19 PROCEDURE — 99214 OFFICE O/P EST MOD 30 MIN: CPT

## 2023-12-19 NOTE — REASON FOR VISIT
[Follow-Up] : a follow-up visit [Abnormal CXR/ Chest CT] : an abnormal CXR/ chest CT [Sleep Apnea] : sleep apnea [Cough] : cough

## 2023-12-19 NOTE — PROCEDURE
[FreeTextEntry1] : 12/2023- CTA chest- cardiomegaly, some mild atelctasis HST- 12/2023 severe LUCRECIA with mild desat  previous data reviewed:.  11/2023 PA and lateral chest xray performed using standard projections. Bones and soft tissues structures are unremarkable. Cardiac silhouette appears normal.  opacity/ atelectasis left base  IMPRESSION:  left base not clearly visualized  the labs were drawn in the office today

## 2023-12-19 NOTE — HISTORY OF PRESENT ILLNESS
[Never] : never [TextBox_4] : GILA SHAH is a 68 year old male who presents for cough f/u and sleep test f/u PMH: NIDDM2, HTN, HLD, asthma, CPAP on LUCRECIA s/p CABG july 2023 (post op High flow, with CPAP, post op anemia)   + GERD, recently seen GI ,  on PPI for 3-4 years  seen initially for cough improved we did CTA chest for a high dimer also repeat HST given his history of LUCRECIA and machine is > 5 years old

## 2023-12-19 NOTE — ASSESSMENT
[FreeTextEntry1] : cough is improving, may be GI? versus ARB?   severe coni  start pap therapy  f/u for cpap compliance data and we can do full pfts at that time

## 2024-01-09 ENCOUNTER — APPOINTMENT (OUTPATIENT)
Dept: GASTROENTEROLOGY | Facility: CLINIC | Age: 69
End: 2024-01-09

## 2024-01-19 ENCOUNTER — OUTPATIENT (OUTPATIENT)
Dept: OUTPATIENT SERVICES | Facility: HOSPITAL | Age: 69
LOS: 1 days | End: 2024-01-19
Payer: COMMERCIAL

## 2024-01-19 ENCOUNTER — TRANSCRIPTION ENCOUNTER (OUTPATIENT)
Age: 69
End: 2024-01-19

## 2024-01-19 VITALS
WEIGHT: 175.93 LBS | TEMPERATURE: 98 F | HEART RATE: 95 BPM | DIASTOLIC BLOOD PRESSURE: 81 MMHG | HEIGHT: 66 IN | RESPIRATION RATE: 18 BRPM | SYSTOLIC BLOOD PRESSURE: 156 MMHG | OXYGEN SATURATION: 95 %

## 2024-01-19 VITALS
DIASTOLIC BLOOD PRESSURE: 83 MMHG | TEMPERATURE: 98 F | RESPIRATION RATE: 16 BRPM | OXYGEN SATURATION: 95 % | HEART RATE: 79 BPM | SYSTOLIC BLOOD PRESSURE: 155 MMHG

## 2024-01-19 DIAGNOSIS — Z95.1 PRESENCE OF AORTOCORONARY BYPASS GRAFT: Chronic | ICD-10-CM

## 2024-01-19 DIAGNOSIS — R94.39 ABNORMAL RESULT OF OTHER CARDIOVASCULAR FUNCTION STUDY: ICD-10-CM

## 2024-01-19 DIAGNOSIS — Z90.49 ACQUIRED ABSENCE OF OTHER SPECIFIED PARTS OF DIGESTIVE TRACT: Chronic | ICD-10-CM

## 2024-01-19 LAB
ALBUMIN SERPL ELPH-MCNC: 4.5 G/DL — SIGNIFICANT CHANGE UP (ref 3.3–5)
ALP SERPL-CCNC: 82 U/L — SIGNIFICANT CHANGE UP (ref 40–120)
ALT FLD-CCNC: 33 U/L — SIGNIFICANT CHANGE UP (ref 10–45)
ANION GAP SERPL CALC-SCNC: 16 MMOL/L — SIGNIFICANT CHANGE UP (ref 5–17)
AST SERPL-CCNC: 20 U/L — SIGNIFICANT CHANGE UP (ref 10–40)
BILIRUB SERPL-MCNC: 0.4 MG/DL — SIGNIFICANT CHANGE UP (ref 0.2–1.2)
BUN SERPL-MCNC: 24 MG/DL — HIGH (ref 7–23)
CALCIUM SERPL-MCNC: 9.9 MG/DL — SIGNIFICANT CHANGE UP (ref 8.4–10.5)
CHLORIDE SERPL-SCNC: 103 MMOL/L — SIGNIFICANT CHANGE UP (ref 96–108)
CO2 SERPL-SCNC: 21 MMOL/L — LOW (ref 22–31)
CREAT SERPL-MCNC: 1.41 MG/DL — HIGH (ref 0.5–1.3)
EGFR: 55 ML/MIN/1.73M2 — LOW
GLUCOSE BLDC GLUCOMTR-MCNC: 101 MG/DL — HIGH (ref 70–99)
GLUCOSE BLDC GLUCOMTR-MCNC: 109 MG/DL — HIGH (ref 70–99)
GLUCOSE BLDC GLUCOMTR-MCNC: 116 MG/DL — HIGH (ref 70–99)
GLUCOSE BLDC GLUCOMTR-MCNC: 70 MG/DL — SIGNIFICANT CHANGE UP (ref 70–99)
GLUCOSE SERPL-MCNC: 125 MG/DL — HIGH (ref 70–99)
HCT VFR BLD CALC: 47 % — SIGNIFICANT CHANGE UP (ref 39–50)
HGB BLD-MCNC: 14.9 G/DL — SIGNIFICANT CHANGE UP (ref 13–17)
MCHC RBC-ENTMCNC: 25.5 PG — LOW (ref 27–34)
MCHC RBC-ENTMCNC: 31.7 GM/DL — LOW (ref 32–36)
MCV RBC AUTO: 80.5 FL — SIGNIFICANT CHANGE UP (ref 80–100)
NRBC # BLD: 0 /100 WBCS — SIGNIFICANT CHANGE UP (ref 0–0)
PLATELET # BLD AUTO: 162 K/UL — SIGNIFICANT CHANGE UP (ref 150–400)
POTASSIUM SERPL-MCNC: 4.2 MMOL/L — SIGNIFICANT CHANGE UP (ref 3.5–5.3)
POTASSIUM SERPL-SCNC: 4.2 MMOL/L — SIGNIFICANT CHANGE UP (ref 3.5–5.3)
PROT SERPL-MCNC: 7.7 G/DL — SIGNIFICANT CHANGE UP (ref 6–8.3)
RBC # BLD: 5.84 M/UL — HIGH (ref 4.2–5.8)
RBC # FLD: 18.8 % — HIGH (ref 10.3–14.5)
SODIUM SERPL-SCNC: 140 MMOL/L — SIGNIFICANT CHANGE UP (ref 135–145)
WBC # BLD: 6.04 K/UL — SIGNIFICANT CHANGE UP (ref 3.8–10.5)
WBC # FLD AUTO: 6.04 K/UL — SIGNIFICANT CHANGE UP (ref 3.8–10.5)

## 2024-01-19 PROCEDURE — 93455 CORONARY ART/GRFT ANGIO S&I: CPT | Mod: 59

## 2024-01-19 PROCEDURE — 99152 MOD SED SAME PHYS/QHP 5/>YRS: CPT

## 2024-01-19 PROCEDURE — C1894: CPT

## 2024-01-19 PROCEDURE — 93005 ELECTROCARDIOGRAM TRACING: CPT

## 2024-01-19 PROCEDURE — C1874: CPT

## 2024-01-19 PROCEDURE — 92928 PRQ TCAT PLMT NTRAC ST 1 LES: CPT | Mod: RC

## 2024-01-19 PROCEDURE — 85027 COMPLETE CBC AUTOMATED: CPT

## 2024-01-19 PROCEDURE — C1769: CPT

## 2024-01-19 PROCEDURE — 80053 COMPREHEN METABOLIC PANEL: CPT

## 2024-01-19 PROCEDURE — 82962 GLUCOSE BLOOD TEST: CPT

## 2024-01-19 PROCEDURE — C9600: CPT | Mod: RC

## 2024-01-19 PROCEDURE — C1887: CPT

## 2024-01-19 PROCEDURE — 93010 ELECTROCARDIOGRAM REPORT: CPT | Mod: 76

## 2024-01-19 PROCEDURE — 93455 CORONARY ART/GRFT ANGIO S&I: CPT | Mod: 26,59

## 2024-01-19 RX ORDER — SITAGLIPTIN AND METFORMIN HYDROCHLORIDE 500; 50 MG/1; MG/1
1 TABLET, FILM COATED ORAL
Qty: 0 | Refills: 0 | DISCHARGE

## 2024-01-19 RX ORDER — FERROUS FUMARATE 350(115)MG
1 TABLET ORAL
Refills: 0 | DISCHARGE

## 2024-01-19 RX ORDER — CLOPIDOGREL BISULFATE 75 MG/1
1 TABLET, FILM COATED ORAL
Qty: 90 | Refills: 3
Start: 2024-01-19 | End: 2025-01-12

## 2024-01-19 RX ORDER — SODIUM CHLORIDE 9 MG/ML
1000 INJECTION INTRAMUSCULAR; INTRAVENOUS; SUBCUTANEOUS
Refills: 0 | Status: DISCONTINUED | OUTPATIENT
Start: 2024-01-19 | End: 2024-02-02

## 2024-01-19 RX ORDER — ATORVASTATIN CALCIUM 80 MG/1
1 TABLET, FILM COATED ORAL
Qty: 30 | Refills: 0
Start: 2024-01-19 | End: 2024-02-17

## 2024-01-19 RX ORDER — INSULIN LISPRO 100/ML
VIAL (ML) SUBCUTANEOUS
Refills: 0 | Status: DISCONTINUED | OUTPATIENT
Start: 2024-01-19 | End: 2024-02-02

## 2024-01-19 RX ORDER — SODIUM CHLORIDE 9 MG/ML
1000 INJECTION, SOLUTION INTRAVENOUS
Refills: 0 | Status: DISCONTINUED | OUTPATIENT
Start: 2024-01-19 | End: 2024-02-02

## 2024-01-19 RX ORDER — SODIUM CHLORIDE 9 MG/ML
500 INJECTION INTRAMUSCULAR; INTRAVENOUS; SUBCUTANEOUS
Refills: 0 | Status: DISCONTINUED | OUTPATIENT
Start: 2024-01-19 | End: 2024-02-02

## 2024-01-19 RX ORDER — DEXTROSE 50 % IN WATER 50 %
25 SYRINGE (ML) INTRAVENOUS ONCE
Refills: 0 | Status: DISCONTINUED | OUTPATIENT
Start: 2024-01-19 | End: 2024-02-02

## 2024-01-19 RX ORDER — ASPIRIN/CALCIUM CARB/MAGNESIUM 324 MG
1 TABLET ORAL
Refills: 0 | DISCHARGE

## 2024-01-19 RX ORDER — INSULIN LISPRO 100/ML
VIAL (ML) SUBCUTANEOUS AT BEDTIME
Refills: 0 | Status: DISCONTINUED | OUTPATIENT
Start: 2024-01-19 | End: 2024-02-02

## 2024-01-19 RX ORDER — TAMSULOSIN HYDROCHLORIDE 0.4 MG/1
0 CAPSULE ORAL
Qty: 0 | Refills: 4 | DISCHARGE

## 2024-01-19 RX ORDER — SODIUM CHLORIDE 9 MG/ML
250 INJECTION INTRAMUSCULAR; INTRAVENOUS; SUBCUTANEOUS ONCE
Refills: 0 | Status: DISCONTINUED | OUTPATIENT
Start: 2024-01-19 | End: 2024-02-02

## 2024-01-19 RX ORDER — LOSARTAN POTASSIUM 100 MG/1
1 TABLET, FILM COATED ORAL
Refills: 0 | DISCHARGE

## 2024-01-19 RX ORDER — ATORVASTATIN CALCIUM 80 MG/1
1 TABLET, FILM COATED ORAL
Refills: 0 | DISCHARGE

## 2024-01-19 RX ORDER — DEXTROSE 50 % IN WATER 50 %
12.5 SYRINGE (ML) INTRAVENOUS ONCE
Refills: 0 | Status: DISCONTINUED | OUTPATIENT
Start: 2024-01-19 | End: 2024-02-02

## 2024-01-19 RX ORDER — EMPAGLIFLOZIN 10 MG/1
1 TABLET, FILM COATED ORAL
Refills: 0 | DISCHARGE

## 2024-01-19 RX ORDER — AMLODIPINE BESYLATE 2.5 MG/1
1 TABLET ORAL
Refills: 0 | DISCHARGE

## 2024-01-19 RX ORDER — GLUCAGON INJECTION, SOLUTION 0.5 MG/.1ML
1 INJECTION, SOLUTION SUBCUTANEOUS ONCE
Refills: 0 | Status: DISCONTINUED | OUTPATIENT
Start: 2024-01-19 | End: 2024-02-02

## 2024-01-19 RX ORDER — DEXTROSE 50 % IN WATER 50 %
15 SYRINGE (ML) INTRAVENOUS ONCE
Refills: 0 | Status: DISCONTINUED | OUTPATIENT
Start: 2024-01-19 | End: 2024-02-02

## 2024-01-19 RX ORDER — OMEPRAZOLE 10 MG/1
1 CAPSULE, DELAYED RELEASE ORAL
Refills: 0 | DISCHARGE

## 2024-01-19 RX ADMIN — SODIUM CHLORIDE 100 MILLILITER(S): 9 INJECTION INTRAMUSCULAR; INTRAVENOUS; SUBCUTANEOUS at 09:01

## 2024-01-19 NOTE — CHART NOTE - NSCHARTNOTEFT_GEN_A_CORE
Removal of Femoral Sheath    Pulses in the right lower extremity are palpable. The patient was placed in the supine position. The insertion site was identified and the sutures were removed per protocol.  The femoral sheath was then removed. Direct pressure was applied for 20 minutes.     Monitoring of the right groin and both lower extremities including neuro-vascular checks and vital signs every 15 minutes x 4, then every 30 minutes x 2, then every 1 hour was ordered.    Complications: None    Comments: Site soft, non tender, no hematoma or bleeding; Instructions reviewed with pt, will cont to monitor.

## 2024-01-19 NOTE — H&P CARDIOLOGY - NSICDXPASTSURGICALHX_GEN_ALL_CORE_FT
PAST SURGICAL HISTORY:  History of appendectomy      PAST SURGICAL HISTORY:  History of appendectomy     S/P CABG (coronary artery bypass graft)

## 2024-01-19 NOTE — ASU DISCHARGE PLAN (ADULT/PEDIATRIC) - CARE PROVIDER_API CALL
Oral Nixon  Cardiovascular Disease  [address] [place] [id number]  Phone: [phone]  Fax: [phone]  Follow Up Time:

## 2024-01-19 NOTE — ASU PATIENT PROFILE, ADULT - FALL HARM RISK - UNIVERSAL INTERVENTIONS
Bed in lowest position, wheels locked, appropriate side rails in place/Call bell, personal items and telephone in reach/Instruct patient to call for assistance before getting out of bed or chair/Non-slip footwear when patient is out of bed/[place] to call system/Physically safe environment - no spills, clutter or unnecessary equipment/Purposeful Proactive Rounding/Room/bathroom lighting operational, light cord in reach

## 2024-01-19 NOTE — H&P CARDIOLOGY - HISTORY OF PRESENT ILLNESS
68y.o M with PMH of HTN, DM    pt was referred for cardiac cath by MD Oral Nixon. 68y.o M with PMH of HTN, T2DM (HgA1C  unknown, patient follows PMD Dr. Gaines), HLD, s/p CABG 7/2023 by Dr. Hall, LUCRECIA present for chest discomfort. Pt reports intermittent chest tightness, not related to activity for the past 6months. Patient underwent NST, which was abnormal (results not available). pt was referred for cardiac cath by MD Oral Nixon.     68y.o M with PMH of HTN, T2DM (HgA1C  unknown, patient follows PMD Dr. Gaines), HLD, s/p CABG 7/2023 by Dr. Hall, LUCRECIA present for chest discomfort. Pt reports intermittent chest tightness, not related to activity for the past 6months. Patient underwent NST, which was abnormal (results not available). pt was referred for cardiac cath by MD Oral Nixon.    TTE 7/2023  ONCLUSIONS:  1. Normal left ventricular cavity size. The left ventricular wall thickness is normal. The left ventricular   systolic function is normal with an ejection fraction of 65 % by Black's method of disks. There are   regional wall motion abnormalities No evidence of a thrombus in the left ventricle.  2. Mid inferolateral segment and mid anterolateral segment are abnormal.  Mid inferolateral segment and mid anterolateral segment are abnormal.  3. Normal atria.  4. Normal right ventricular cavity size, normal right ventricular wall thickness and normal right ventricular   systolic function. The tricuspid annular plane systolic excursion (TAPSE) is 2.1 cm (normal >=1.7   cm).  5. No significant valvular disease.  6. No pericardial effusion seen.  7. No prior echocardiogram is available for comparison.

## 2024-01-19 NOTE — H&P CARDIOLOGY - VASCULAR
Subjective   Es Camara is a 26 y.o. male who presents to the office for a pilonidal abscess.    History of Present Illness     The patient developed a painful lesion in his gluteal fold last week.  He stated that nothing was visible at the skin but there is a painful area on palpation and some red streaking.  He went to an urgent care center where he was diagnosed with an infection and started on Bactrim.  For the next 2 or 3 days the areas seem to get worse with more pain and the development of a small abscess that he described as a pimple.  It then ruptured with drainage of blood as well as purulent material and he felt much better.  He is now 2 days out from the rupture with no residual symptoms.  He has not had any fevers nor chills.  There is no ongoing drainage.    Review of Systems   Constitutional: Negative for fatigue and fever.   Respiratory: Negative for chest tightness and shortness of breath.    Cardiovascular: Negative for chest pain and palpitations.   Gastrointestinal: Negative for abdominal pain, blood in stool, constipation, diarrhea, nausea and vomiting.     History reviewed. No pertinent past medical history.  Past Surgical History:   Procedure Laterality Date   • WISDOM TOOTH EXTRACTION       History reviewed. No pertinent family history.  Social History     Socioeconomic History   • Marital status: Single   Tobacco Use   • Smoking status: Never   • Smokeless tobacco: Never   Vaping Use   • Vaping Use: Former   Substance and Sexual Activity   • Alcohol use: Yes   • Drug use: Not Currently   • Sexual activity: Defer       Objective   Physical Exam  Constitutional:       Appearance: Normal appearance. He is well-developed. He is not toxic-appearing.   Eyes:      General: No scleral icterus.  Pulmonary:      Effort: Pulmonary effort is normal. No respiratory distress.   Skin:     General: Skin is warm and dry.      Comments: There is a small open area near the superior aspect of the gluteal fold  with infected serous fluid draining.  There is no surrounding cellulitis.  There is no undrained collection.  There are no visible sinus tracts.   Neurological:      Mental Status: He is alert and oriented to person, place, and time.   Psychiatric:         Behavior: Behavior normal.         Thought Content: Thought content normal.         Judgment: Judgment normal.         Assessment & Plan     1.  Pilonidal abscess: The patient developed an abscess of the superior aspect of the gluteal fold that is consistent with a pilonidal abscess.  He was started on antibiotics and then had spontaneous rupture with drainage.  He now has near complete resolution of the process with only minimal drainage remaining.  There is no need for surgical procedure at this time.  This was discussed with him.  He was advised to complete his course of antibiotics.  He will then follow the area and if a cyst recurs, he will return to the office for further evaluation.              Equal and normal pulses (carotid, femoral, dorsalis pedis)

## 2024-02-06 ENCOUNTER — APPOINTMENT (OUTPATIENT)
Dept: PULMONOLOGY | Facility: CLINIC | Age: 69
End: 2024-02-06
Payer: MEDICARE

## 2024-02-06 VITALS
DIASTOLIC BLOOD PRESSURE: 83 MMHG | SYSTOLIC BLOOD PRESSURE: 127 MMHG | TEMPERATURE: 98.3 F | WEIGHT: 169 LBS | BODY MASS INDEX: 27.16 KG/M2 | HEIGHT: 66 IN | OXYGEN SATURATION: 98 % | HEART RATE: 91 BPM

## 2024-02-06 PROCEDURE — 99214 OFFICE O/P EST MOD 30 MIN: CPT

## 2024-02-06 PROCEDURE — ZZZZZ: CPT

## 2024-02-06 NOTE — HISTORY OF PRESENT ILLNESS
[Never] : never [TextBox_4] : GILA SHAH is a 68 year old male who presents for coni seen initially for cough and CONI  PMH: NIDDM2, HTN, HLD, asthma, CPAP on CONI s/p CABG july 2023 (post op High flow, with CPAP, post op anemia)   + GERD, recently seen GI ,  on PPI for 3-4 years     CTA chest for a high dimer, no PE cough has since resolved   also repeat HST given his history of CONI and machine is > 5 years old  restarted on autocpap  here for cpap compliance   on n30 mask pain with the straps  feels uncomfortable some mask leak

## 2024-02-06 NOTE — PROCEDURE
[FreeTextEntry1] : [facility]#  [id number] compliance data reviewed : 1/2024-2/2024 % days used : 97% avg use > 4 hours: 73% avg use: 5 hrs &44  min avg pressure 10 cmh20 avg AHI 7 minimal leak.  12/2023- CTA chest- cardiomegaly, some mild atelctasis HST- 12/2023 severe LUCRECIA with mild desat  11/2023 PA and lateral chest xray performed using standard projections. Bones and soft tissues structures are unremarkable. Cardiac silhouette appears normal.  opacity/ atelectasis left base  IMPRESSION:  left base not clearly visualized  the labs were drawn in the office today

## 2024-02-26 NOTE — PROGRESS NOTE ADULT - ASSESSMENT
Cardiac arrest    60 year old male    Nursing home patient    Epi gtt    Ecg: no stemi      Flight  Accepted by Dr Fowler   67-year-old male with NIDDM2, HTN, HLD, asthma, CPAP on LUCRECIA, sent to the ED by Dr. Hart for work-up of EKG abnormalities. pain is described as a pressure over left and right anterior chest wall, radiating to R shoulder, 10/10 in severity, lasting 15-30 minutes at a time, worse with exertion, associated with diaphoresis and without any associated nausea, shortness of breath or palpitations.   Cardiac cath done on 7/14/23 showed - LHC: pLAD 90%, D1 95%, mLCx 90%, mRCA 90%, LVEDP 15, Transferred to [facility] for CABg eval w/ Dr Hernandez  7/19   CABG  post op High flow,       with CPAP,   post op anemia  insulin gtt  7/23    2 yu  lt pl chest tube   NSR  lop 25 q8   colchicine for pl eff   hyperglycemic  with endo following with insulin adjusted  7/24 Ambulated Replete potassium ASA statin betablocker CAD

## 2024-03-26 ENCOUNTER — APPOINTMENT (OUTPATIENT)
Dept: GASTROENTEROLOGY | Facility: CLINIC | Age: 69
End: 2024-03-26
Payer: MEDICARE

## 2024-03-26 VITALS
TEMPERATURE: 97.6 F | HEIGHT: 66 IN | SYSTOLIC BLOOD PRESSURE: 106 MMHG | OXYGEN SATURATION: 96 % | BODY MASS INDEX: 27.64 KG/M2 | HEART RATE: 84 BPM | WEIGHT: 172 LBS | DIASTOLIC BLOOD PRESSURE: 67 MMHG | RESPIRATION RATE: 17 BRPM

## 2024-03-26 DIAGNOSIS — D50.9 IRON DEFICIENCY ANEMIA, UNSPECIFIED: ICD-10-CM

## 2024-03-26 DIAGNOSIS — E11.49 TYPE 2 DIABETES MELLITUS WITH OTHER DIABETIC NEUROLOGICAL COMPLICATION: ICD-10-CM

## 2024-03-26 DIAGNOSIS — Z95.1 PRESENCE OF AORTOCORONARY BYPASS GRAFT: ICD-10-CM

## 2024-03-26 DIAGNOSIS — Z86.69 PERSONAL HISTORY OF OTHER DISEASES OF THE NERVOUS SYSTEM AND SENSE ORGANS: ICD-10-CM

## 2024-03-26 PROCEDURE — 99214 OFFICE O/P EST MOD 30 MIN: CPT

## 2024-03-26 RX ORDER — LOSARTAN POTASSIUM 100 MG/1
100 TABLET, FILM COATED ORAL
Qty: 30 | Refills: 0 | Status: DISCONTINUED | COMMUNITY
Start: 2023-09-05 | End: 2024-03-26

## 2024-03-26 RX ORDER — TAMSULOSIN HYDROCHLORIDE 0.4 MG/1
0.4 CAPSULE ORAL
Refills: 0 | Status: ACTIVE | COMMUNITY

## 2024-03-26 RX ORDER — AMLODIPINE BESYLATE 5 MG/1
5 TABLET ORAL DAILY
Qty: 30 | Refills: 5 | Status: ACTIVE | COMMUNITY

## 2024-03-26 RX ORDER — ATORVASTATIN CALCIUM 40 MG/1
40 TABLET, FILM COATED ORAL
Refills: 0 | Status: ACTIVE | COMMUNITY

## 2024-03-26 RX ORDER — GLIPIZIDE 10 MG/1
10 TABLET, FILM COATED, EXTENDED RELEASE ORAL
Qty: 30 | Refills: 0 | Status: DISCONTINUED | COMMUNITY
Start: 2023-04-14 | End: 2024-03-26

## 2024-03-26 RX ORDER — FUROSEMIDE 40 MG/1
40 TABLET ORAL DAILY
Refills: 0 | Status: ACTIVE | COMMUNITY
Start: 2023-10-27

## 2024-03-26 NOTE — REASON FOR VISIT
[Follow-up] : a follow-up of an existing diagnosis [FreeTextEntry1] : iron def anemia, s/p CABG and stent

## 2024-03-26 NOTE — HISTORY OF PRESENT ILLNESS
[FreeTextEntry1] : 68-year-old male with NIDDM2, HTN, HLD, asthma, CPAP on LUCRECIA, sent to the ED by Dr. Nixon for work-up of EKG abnormalities and exertional chest pain. Cardiac cath done on 7/14/23 showed - LHC: pLAD 90%, D1 95%, mLCx 90%, mRCA, 90%, LVEDP 15, Patient is status post CABG on 7/19/2023.  It was CABG x5.  Patient has been doing much better since his CABG.  He no longer has chest pain and his dyspnea on exertion has improved dramatically. Patient was referred because on 9/14 blood work revealed evidence of iron deficiency anemia.  His H/H was 10/32.5, MCV 76.8, iron/TIBC 23/376, 6% iron saturation, ferritin 14.  According to the patient, he was not told of iron deficiency anemia prior to his CABG. His bowel movements are regular.  He does not see any blood or mucus in the stool.  He does not have dark stool.  His last colonoscopy was over 10 years ago. Patient does complain of frequent heartburn.  He has been on omeprazole for the past 3 to 4 years.  He has no alarming symptoms such as dysphagia or early satiety.  He has never had an upper endoscopy.  11/3/2023-Patient continues with persistent cough.  This started after the surgery. Patient has no heartburn or regurgitation.  He continues to take omeprazole.  He had a colonoscopy over 7 years ago and an upper endoscopy many years ago. He is being treated for iron deficiency anemia with iron supplements.  His H/H is 11.2/38.6, MCV 80.6, iron/TIBC 39/362, 11% iron saturation, ferritin 18.  3/26/2024-patient had a stent placed on 1/19/2024.  He continues to take aspirin but does not appear to be on Plavix.  His H/H from the hospital was 14.9/47, MCV 80.5, BUN/creatinine 24/1.41, LFTs were normal.  The stent was placed in his  R coronary artery.  He did have patent grafts.  He is not having any chest pain.  He does have some epigastric discomfort.  He continues to take omeprazole 40 mg daily.  His bowel movements are regular.  There is no bright red blood or melena.

## 2024-03-26 NOTE — ASSESSMENT
[FreeTextEntry1] : Patient had CABG last year and on 1/19/2024 had a stent placed in his right coronary artery.  The previous grafts were patent. His H/H has improved.  He will be reduced from 2 iron pills per day to 1 iron tablet per day.  He is due for his upper endoscopy and colonoscopy but since he had a recent stent, this will be deferred.  He will be seen in 3 months and scheduled at that time if he receives cardiac clearance. His cough had gone away but over the past 2 weeks has recurred.  He does see his pulmonologist.

## 2024-03-26 NOTE — PHYSICAL EXAM
[Alert] : alert [Normal Voice/Communication] : normal voice/communication [Healthy Appearing] : healthy appearing [No Acute Distress] : no acute distress [Sclera] : the sclera and conjunctiva were normal [Hearing Threshold Finger Rub Not [place]] : hearing was normal [Normal Lips/Gums] : the lips and gums were normal [Oropharynx] : the oropharynx was normal [Normal Appearance] : the appearance of the neck was normal [No Neck Mass] : no neck mass was observed [No Respiratory Distress] : no respiratory distress [No Acc Muscle Use] : no accessory muscle use [Respiration, Rhythm And Depth] : normal respiratory rhythm and effort [Auscultation Breath Sounds / Voice Sounds] : lungs were clear to auscultation bilaterally [Heart Rate And Rhythm] : heart rate was normal and rhythm regular [Normal S1, S2] : normal S1 and S2 [Murmurs] : no murmurs [Bowel Sounds] : normal bowel sounds [Abdomen Tenderness] : non-tender [No Masses] : no abdominal mass palpated [Abdomen Soft] : soft [] : no hepatosplenomegaly [Oriented To Time, Place, And Person] : oriented to person, place, and time

## 2024-03-26 NOTE — REVIEW OF SYSTEMS
[Cough] : cough [As Noted in HPI] : as noted in HPI [Negative] : Heme/Lymph [FreeTextEntry5] : CAD s/p CABG and stents [FreeTextEntry6] : LUCRECIA

## 2024-04-24 ENCOUNTER — NON-APPOINTMENT (OUTPATIENT)
Age: 69
End: 2024-04-24

## 2024-05-22 LAB — HEMOCCULT STL QL IA: NEGATIVE

## 2024-06-25 ENCOUNTER — APPOINTMENT (OUTPATIENT)
Dept: GASTROENTEROLOGY | Facility: CLINIC | Age: 69
End: 2024-06-25

## 2024-07-07 NOTE — OCCUPATIONAL THERAPY INITIAL EVALUATION ADULT - LEVEL OF INDEPENDENCE: DRESS LOWER BODY, OT EVAL
PRE-OP DIAGNOSIS:  Ileocolic intussusception 07-Jul-2024 01:21:35  Aman Alves  
maximum assist (25% patients effort)

## 2024-07-09 ENCOUNTER — APPOINTMENT (OUTPATIENT)
Dept: GASTROENTEROLOGY | Facility: CLINIC | Age: 69
End: 2024-07-09
Payer: MEDICARE

## 2024-07-09 VITALS
WEIGHT: 178 LBS | SYSTOLIC BLOOD PRESSURE: 136 MMHG | BODY MASS INDEX: 28.61 KG/M2 | HEART RATE: 66 BPM | TEMPERATURE: 97.8 F | RESPIRATION RATE: 17 BRPM | HEIGHT: 66 IN | DIASTOLIC BLOOD PRESSURE: 80 MMHG | OXYGEN SATURATION: 97 %

## 2024-07-09 DIAGNOSIS — Z95.5 PRESENCE OF CORONARY ANGIOPLASTY IMPLANT AND GRAFT: ICD-10-CM

## 2024-07-09 DIAGNOSIS — D50.9 IRON DEFICIENCY ANEMIA, UNSPECIFIED: ICD-10-CM

## 2024-07-09 DIAGNOSIS — Z86.69 PERSONAL HISTORY OF OTHER DISEASES OF THE NERVOUS SYSTEM AND SENSE ORGANS: ICD-10-CM

## 2024-07-09 DIAGNOSIS — Z95.1 PRESENCE OF AORTOCORONARY BYPASS GRAFT: ICD-10-CM

## 2024-07-09 DIAGNOSIS — E11.49 TYPE 2 DIABETES MELLITUS WITH OTHER DIABETIC NEUROLOGICAL COMPLICATION: ICD-10-CM

## 2024-07-09 PROCEDURE — 99214 OFFICE O/P EST MOD 30 MIN: CPT

## 2024-07-09 RX ORDER — SODIUM SULFATE, POTASSIUM SULFATE AND MAGNESIUM SULFATE 1.6; 3.13; 17.5 G/177ML; G/177ML; G/177ML
17.5-3.13-1.6 SOLUTION ORAL
Qty: 1 | Refills: 0 | Status: ACTIVE | COMMUNITY
Start: 2024-07-09 | End: 1900-01-01

## 2024-08-15 ENCOUNTER — APPOINTMENT (OUTPATIENT)
Dept: PULMONOLOGY | Facility: CLINIC | Age: 69
End: 2024-08-15

## 2024-08-29 ENCOUNTER — NON-APPOINTMENT (OUTPATIENT)
Age: 69
End: 2024-08-29

## 2024-09-05 ENCOUNTER — APPOINTMENT (OUTPATIENT)
Dept: PULMONOLOGY | Facility: CLINIC | Age: 69
End: 2024-09-05
Payer: MEDICARE

## 2024-09-05 VITALS
TEMPERATURE: 97.2 F | BODY MASS INDEX: 27 KG/M2 | DIASTOLIC BLOOD PRESSURE: 84 MMHG | OXYGEN SATURATION: 97 % | HEIGHT: 66 IN | WEIGHT: 168 LBS | HEART RATE: 85 BPM | SYSTOLIC BLOOD PRESSURE: 152 MMHG

## 2024-09-05 DIAGNOSIS — Z86.69 PERSONAL HISTORY OF OTHER DISEASES OF THE NERVOUS SYSTEM AND SENSE ORGANS: ICD-10-CM

## 2024-09-05 PROCEDURE — 99214 OFFICE O/P EST MOD 30 MIN: CPT

## 2024-09-05 PROCEDURE — G2211 COMPLEX E/M VISIT ADD ON: CPT

## 2024-09-05 NOTE — HISTORY OF PRESENT ILLNESS
[Never] : never [TextBox_4] : GILA SHAH is a 68 year old male who presents for coni f/u  PMH: NIDDM2, HTN, HLD, asthma, CPAP  diagnosed  was on cpap  but DME take back after poor use  here for new rx for machine   pending edge colon

## 2024-09-05 NOTE — PROCEDURE
[FreeTextEntry1] : previous data reviewed: 12/2023- CTA chest- cardiomegaly, some mild atelctasis HST- 12/2023 severe LUCRECIA with mild desat  11/2023 PA and lateral chest xray performed using standard projections. Bones and soft tissues structures are unremarkable. Cardiac silhouette appears normal.  opacity/ atelectasis left base  IMPRESSION:  left base not clearly visualized  the labs were drawn in the office today

## 2024-09-15 ENCOUNTER — NON-APPOINTMENT (OUTPATIENT)
Age: 69
End: 2024-09-15

## 2024-09-19 ENCOUNTER — APPOINTMENT (OUTPATIENT)
Dept: PULMONOLOGY | Facility: CLINIC | Age: 69
End: 2024-09-19

## 2024-10-10 ENCOUNTER — APPOINTMENT (OUTPATIENT)
Dept: GASTROENTEROLOGY | Facility: AMBULATORY MEDICAL SERVICES | Age: 69
End: 2024-10-10

## 2025-02-13 ENCOUNTER — APPOINTMENT (OUTPATIENT)
Dept: PULMONOLOGY | Facility: CLINIC | Age: 70
End: 2025-02-13
Payer: MEDICARE

## 2025-02-13 PROCEDURE — 99213 OFFICE O/P EST LOW 20 MIN: CPT | Mod: 93

## 2025-02-25 ENCOUNTER — NON-APPOINTMENT (OUTPATIENT)
Age: 70
End: 2025-02-25

## 2025-05-29 ENCOUNTER — APPOINTMENT (OUTPATIENT)
Dept: ORTHOPEDIC SURGERY | Facility: CLINIC | Age: 70
End: 2025-05-29
Payer: MEDICARE

## 2025-05-29 DIAGNOSIS — M65.312 TRIGGER THUMB, LEFT THUMB: ICD-10-CM

## 2025-05-29 DIAGNOSIS — M65.351 TRIGGER FINGER, RIGHT LITTLE FINGER: ICD-10-CM

## 2025-05-29 PROCEDURE — 99204 OFFICE O/P NEW MOD 45 MIN: CPT | Mod: 25

## 2025-05-29 PROCEDURE — 20550 NJX 1 TENDON SHEATH/LIGAMENT: CPT | Mod: F9,FA

## 2025-09-15 ENCOUNTER — APPOINTMENT (OUTPATIENT)
Dept: PULMONOLOGY | Facility: CLINIC | Age: 70
End: 2025-09-15
Payer: MEDICARE

## 2025-09-15 PROCEDURE — 99213 OFFICE O/P EST LOW 20 MIN: CPT | Mod: 2W

## (undated) DEVICE — GLV 8 PROTEXIS (WHITE)

## (undated) DEVICE — BULLDOG SPRING CLIP 6MM SOFT/SOFT

## (undated) DEVICE — SOL IRR POUR H2O 250ML

## (undated) DEVICE — SENSOR MYOCARDIAL TEMP 15MM

## (undated) DEVICE — BLADE SCALPEL SAFETYLOCK #15

## (undated) DEVICE — GOWN TRIMAX LG

## (undated) DEVICE — SUT POLYSORB 2 30" GS-26

## (undated) DEVICE — NDL COUNTER FOAM AND MAGNET 40-70

## (undated) DEVICE — SUT PROLENE 6-0 4-30" C-1

## (undated) DEVICE — DRAIN CHANNEL 19FR ROUND FULL FLUTED

## (undated) DEVICE — MEDICATION LABELS W MARKER

## (undated) DEVICE — DRAIN CHANNEL 32FR ROUND HUBLESS FULL FLUTED

## (undated) DEVICE — DRSG TEGADERM 6"X8"

## (undated) DEVICE — POSITIONER FOAM EGG CRATE ULNAR 2PCS (PINK)

## (undated) DEVICE — SUT PROLENE 4-0 36" RB-1

## (undated) DEVICE — SOL IRR BAG NS 0.9% 3000ML

## (undated) DEVICE — DRAPE MAYO STAND 30"

## (undated) DEVICE — CONNECTOR STRAIGHT 3/8 X 1/2"

## (undated) DEVICE — DRAPE INSTRUMENT POUCH 6.75" X 11"

## (undated) DEVICE — VENODYNE/SCD SLEEVE CALF MEDIUM

## (undated) DEVICE — SUT SOFSILK 2 60" TIES

## (undated) DEVICE — ELCTR BOVIE TIP BLADE VALLEYLAB 6.5"

## (undated) DEVICE — CHEST DRAIN PLEUR-EVAC WET/WET ADULT-PEDS SINGLE (QUICK)

## (undated) DEVICE — PHRENIC NERVE PAD MEDIUM

## (undated) DEVICE — STRYKER INTERPULSE HANDPIECE W IRR SUCTION TUBE

## (undated) DEVICE — VISITEC 4X4

## (undated) DEVICE — SUT SOFSILK 0 30" V-20

## (undated) DEVICE — FILTER REINFUSION FOR SALVAGED BLOOD DISP

## (undated) DEVICE — DRSG KLING 6"

## (undated) DEVICE — SYR LUER LOK 30CC

## (undated) DEVICE — SYS VEIN HARVESTING VIRTUOSAPH PLUS W/ RADIAL

## (undated) DEVICE — STAPLER SKIN VISI-STAT 35 WIDE

## (undated) DEVICE — SUCTION YANKAUER NO CONTROL VENT

## (undated) DEVICE — SUCTION CATH ARGYLE WHISTLE TIP 14FR STRAIGHT PACKED

## (undated) DEVICE — PREP CHLORAPREP HI-LITE ORANGE 26ML

## (undated) DEVICE — AORTIC PUNCH 4.0MM LONG LENGTH HANDLE

## (undated) DEVICE — WARMING BLANKET DUO-THERM HYPER/HYPOTHERM ADULT

## (undated) DEVICE — TUBING KIT FAST START ATF 40

## (undated) DEVICE — SUT PROLENE 7-0 4-24" BV-1

## (undated) DEVICE — DRSG STERISTRIPS 0.5 X 4"

## (undated) DEVICE — DRSG STOCKINETTE IMPERVIOUS XL

## (undated) DEVICE — SUT POLYSORB 2-0 30" GS-21 UNDYED

## (undated) DEVICE — DRAPE 3/4 SHEET W REINFORCEMENT 56X77"

## (undated) DEVICE — VENTING ADAPTER "Y" (RED/BLUE) 7.5"

## (undated) DEVICE — STEALTH CLAMP INSERT FIBRA/FIBRA 90MM

## (undated) DEVICE — CONNECTOR INTERSEPT "Y" 1/4 X 1/4 X 1/4"

## (undated) DEVICE — SOL ANTI FOG

## (undated) DEVICE — SUT STAINLESS STEEL 5 18" SCC

## (undated) DEVICE — DRSG OPSITE 13.75 X 4"

## (undated) DEVICE — NDL BLUNT 18G LOOP VESSEL MAXI WHITE

## (undated) DEVICE — SYR ASEPTO

## (undated) DEVICE — SUT POLYSORB 4-0 30" CVF-23

## (undated) DEVICE — SUMP PERICARDIAL 20FR 1/4" ADULT

## (undated) DEVICE — SYR LUER LOK 20CC

## (undated) DEVICE — SUT BIOSYN 4-0 18" P-12

## (undated) DEVICE — PACK UNIVERSAL CARDIAC

## (undated) DEVICE — SUT PROLENE 8-0 24" BV175-6

## (undated) DEVICE — GLV 6.5 PROTEXIS (WHITE)

## (undated) DEVICE — DRAIN RESERVOIR FOR JACKSON PRATT 100CC CARDINAL

## (undated) DEVICE — DRAPE IOBAN 23" X 23"

## (undated) DEVICE — SUT POLYSORB 0 36" GS-25 UNDYED

## (undated) DEVICE — SPECIMEN CONTAINER 100ML

## (undated) DEVICE — GLV 8 PROTEXIS ORTHO (CREAM)

## (undated) DEVICE — GOWN XXXL

## (undated) DEVICE — GLV 7.5 PROTEXIS (WHITE)

## (undated) DEVICE — DRSG ACE BANDAGE 6"

## (undated) DEVICE — GLV 8.5 PROTEXIS (WHITE)

## (undated) DEVICE — GOWN TRIMAX XXL

## (undated) DEVICE — LAP PAD 18 X 18"

## (undated) DEVICE — BLADE SCALPEL SAFETYLOCK #10

## (undated) DEVICE — WARMING BLANKET FULL ADULT

## (undated) DEVICE — SAW BLADE MICROAIRE STERNUM 1X34X9.4MM

## (undated) DEVICE — TUBING TRUWAVE PRESSURE MALE/FEMALE 72"

## (undated) DEVICE — SUT TICRON 5 30" KV-40

## (undated) DEVICE — FEEDING TUBE NG SUMP 16FR 48"

## (undated) DEVICE — SUT PLEDGET PRE PUNCH 4.8 X 9.5 X 1.5 MM

## (undated) DEVICE — POSITIONER CARDIAC BUMP

## (undated) DEVICE — SOL NORMOSOL-R PH7.4 1000ML

## (undated) DEVICE — TUBING INSUFFLATION LAP FILTER 10FT

## (undated) DEVICE — GETINGE VASOVIEW 7 ENDOSCOPIC VESSEL HARVESTING SYSTEM

## (undated) DEVICE — SYR LUER LOK 1CC

## (undated) DEVICE — BEAVER BLADE MINI SHARP ALL ROUND (BLUE)

## (undated) DEVICE — SUT BLUNT SZ 5

## (undated) DEVICE — SUT DOUBLE 6 WIRE STERNAL

## (undated) DEVICE — PACK CUSTOM W/INSPIRE OXYGENATOR

## (undated) DEVICE — BLADE SCALPEL SAFETYLOCK #11

## (undated) DEVICE — SOL IRR POUR NS 0.9% 500ML

## (undated) DEVICE — SUT SURGICAL STEEL 6 30" BP-1

## (undated) DEVICE — SUT PROLENE 3-0 36" SH

## (undated) DEVICE — SUT PROLENE 7-0 24" BV175-6

## (undated) DEVICE — DRAPE SLUSH / WARMER 44 X 66"

## (undated) DEVICE — MULTIPLE PERFUSION SET FEMALE 1 INLET LEG W 4 LEGS 15" (BLUE/RED)

## (undated) DEVICE — DRAPE 1/2 SHEET 40X57"

## (undated) DEVICE — WARMING BLANKET UPPER ADULT

## (undated) DEVICE — TUBING TUR 2 PRONG

## (undated) DEVICE — NDL HYPO SAFE 18G X 1.5" (PINK)

## (undated) DEVICE — FOLEY TRAY 16FR 5CC LF LUBRISIL ADVANCE TEMP CLOSED

## (undated) DEVICE — DRAPE LIGHT HANDLE COVER (BLUE)

## (undated) DEVICE — AORTIC PUNCH 5MM STANDARD HANDLE

## (undated) DEVICE — DRAPE TOWEL BLUE 17" X 24"

## (undated) DEVICE — MARKING PEN W RULER

## (undated) DEVICE — SUT PROLENE 4-0 36" BB

## (undated) DEVICE — TOURNIQUET SET 12FR (1 RED, 1 BLUE, 1 SNARE) 7"

## (undated) DEVICE — VESSEL LOOP MAXI-RED  0.120" X 16"

## (undated) DEVICE — SYNOVIS VASCULAR PROBE 1.5MM 15CM

## (undated) DEVICE — STEALTH CLAMP INSERT FIBRA/FIBRA 60MM

## (undated) DEVICE — GLV 7 PROTEXIS (WHITE)

## (undated) DEVICE — AORTIC PUNCH 4.0MM STANDARD HANDLE

## (undated) DEVICE — TUBING ATS SUCTION LINE

## (undated) DEVICE — CONNECTOR "Y" 1/2 X 3/8 X 3/8"

## (undated) DEVICE — PACING CABLE (BROWN) A/V TEMP SCREW DOWN 12FT

## (undated) DEVICE — PACK UNIVERSAL CARDIAC SUPPLEMNTAL B

## (undated) DEVICE — DRSG OPSITE 2.5 X 2"

## (undated) DEVICE — TUBING SUCTION 20FT

## (undated) DEVICE — DRSG DERMABOND PRINEO 60CM